# Patient Record
Sex: FEMALE | Employment: FULL TIME | URBAN - METROPOLITAN AREA
[De-identification: names, ages, dates, MRNs, and addresses within clinical notes are randomized per-mention and may not be internally consistent; named-entity substitution may affect disease eponyms.]

---

## 2017-01-04 ENCOUNTER — OFFICE VISIT - RIVER FALLS (OUTPATIENT)
Dept: FAMILY MEDICINE | Facility: CLINIC | Age: 37
End: 2017-01-04
Payer: COMMERCIAL

## 2017-01-04 ASSESSMENT — MIFFLIN-ST. JEOR: SCORE: 1182.97

## 2017-04-06 ENCOUNTER — OFFICE VISIT - RIVER FALLS (OUTPATIENT)
Dept: FAMILY MEDICINE | Facility: CLINIC | Age: 37
End: 2017-04-06
Payer: COMMERCIAL

## 2017-08-26 ENCOUNTER — HEALTH MAINTENANCE LETTER (OUTPATIENT)
Age: 37
End: 2017-08-26

## 2017-09-13 ENCOUNTER — OFFICE VISIT - RIVER FALLS (OUTPATIENT)
Dept: FAMILY MEDICINE | Facility: CLINIC | Age: 37
End: 2017-09-13
Payer: COMMERCIAL

## 2017-09-13 ASSESSMENT — MIFFLIN-ST. JEOR: SCORE: 1218.35

## 2017-09-19 ENCOUNTER — OFFICE VISIT - RIVER FALLS (OUTPATIENT)
Dept: FAMILY MEDICINE | Facility: CLINIC | Age: 37
End: 2017-09-19
Payer: COMMERCIAL

## 2017-09-20 ENCOUNTER — OFFICE VISIT - RIVER FALLS (OUTPATIENT)
Dept: FAMILY MEDICINE | Facility: CLINIC | Age: 37
End: 2017-09-20
Payer: COMMERCIAL

## 2017-10-30 ENCOUNTER — OFFICE VISIT - RIVER FALLS (OUTPATIENT)
Dept: FAMILY MEDICINE | Facility: CLINIC | Age: 37
End: 2017-10-30
Payer: COMMERCIAL

## 2017-10-30 ASSESSMENT — MIFFLIN-ST. JEOR: SCORE: 1202.93

## 2017-11-01 ENCOUNTER — OFFICE VISIT - RIVER FALLS (OUTPATIENT)
Dept: FAMILY MEDICINE | Facility: CLINIC | Age: 37
End: 2017-11-01
Payer: COMMERCIAL

## 2017-11-01 ASSESSMENT — MIFFLIN-ST. JEOR: SCORE: 1202.93

## 2017-11-15 ENCOUNTER — OFFICE VISIT - RIVER FALLS (OUTPATIENT)
Dept: FAMILY MEDICINE | Facility: CLINIC | Age: 37
End: 2017-11-15
Payer: COMMERCIAL

## 2018-01-29 ENCOUNTER — OFFICE VISIT - RIVER FALLS (OUTPATIENT)
Dept: FAMILY MEDICINE | Facility: CLINIC | Age: 38
End: 2018-01-29
Payer: COMMERCIAL

## 2018-01-29 ASSESSMENT — MIFFLIN-ST. JEOR: SCORE: 1221.53

## 2018-01-31 ENCOUNTER — AMBULATORY - RIVER FALLS (OUTPATIENT)
Dept: FAMILY MEDICINE | Facility: CLINIC | Age: 38
End: 2018-01-31
Payer: COMMERCIAL

## 2018-03-03 ENCOUNTER — OFFICE VISIT - RIVER FALLS (OUTPATIENT)
Dept: FAMILY MEDICINE | Facility: CLINIC | Age: 38
End: 2018-03-03
Payer: COMMERCIAL

## 2018-07-12 ENCOUNTER — OFFICE VISIT - RIVER FALLS (OUTPATIENT)
Dept: FAMILY MEDICINE | Facility: CLINIC | Age: 38
End: 2018-07-12
Payer: COMMERCIAL

## 2018-07-12 ASSESSMENT — MIFFLIN-ST. JEOR: SCORE: 1194.31

## 2018-08-27 ENCOUNTER — HEALTH MAINTENANCE LETTER (OUTPATIENT)
Age: 38
End: 2018-08-27

## 2018-09-08 ENCOUNTER — OFFICE VISIT - RIVER FALLS (OUTPATIENT)
Dept: FAMILY MEDICINE | Facility: CLINIC | Age: 38
End: 2018-09-08
Payer: COMMERCIAL

## 2019-01-17 ENCOUNTER — OFFICE VISIT - RIVER FALLS (OUTPATIENT)
Dept: FAMILY MEDICINE | Facility: CLINIC | Age: 39
End: 2019-01-17
Payer: COMMERCIAL

## 2019-01-22 ENCOUNTER — AMBULATORY - RIVER FALLS (OUTPATIENT)
Dept: FAMILY MEDICINE | Facility: CLINIC | Age: 39
End: 2019-01-22
Payer: COMMERCIAL

## 2019-01-22 ENCOUNTER — COMMUNICATION - RIVER FALLS (OUTPATIENT)
Dept: FAMILY MEDICINE | Facility: CLINIC | Age: 39
End: 2019-01-22
Payer: COMMERCIAL

## 2019-01-27 LAB
CAMPYLOBACTER CULTURE: NORMAL
CLOSTRIDIUM DIFFICILE TOXIN A AND B EIA-QUEST: NORMAL
Lab: NORMAL
SALMONELLA/SHIGELLA SCREEN: NORMAL
SHIGA TOXIN 1: NORMAL

## 2019-06-29 ENCOUNTER — OFFICE VISIT - RIVER FALLS (OUTPATIENT)
Dept: FAMILY MEDICINE | Facility: CLINIC | Age: 39
End: 2019-06-29
Payer: COMMERCIAL

## 2019-07-03 ENCOUNTER — OFFICE VISIT - RIVER FALLS (OUTPATIENT)
Dept: FAMILY MEDICINE | Facility: CLINIC | Age: 39
End: 2019-07-03
Payer: COMMERCIAL

## 2019-07-03 ASSESSMENT — MIFFLIN-ST. JEOR: SCORE: 1168.91

## 2019-07-28 ENCOUNTER — OFFICE VISIT - RIVER FALLS (OUTPATIENT)
Dept: FAMILY MEDICINE | Facility: CLINIC | Age: 39
End: 2019-07-28
Payer: COMMERCIAL

## 2019-08-14 ENCOUNTER — OFFICE VISIT - RIVER FALLS (OUTPATIENT)
Dept: FAMILY MEDICINE | Facility: CLINIC | Age: 39
End: 2019-08-14
Payer: COMMERCIAL

## 2019-08-14 ASSESSMENT — MIFFLIN-ST. JEOR: SCORE: 1163.23

## 2019-08-15 LAB — HGB BLD-MCNC: 13.8 GM/DL (ref 11.7–15.5)

## 2019-09-10 ENCOUNTER — OFFICE VISIT - RIVER FALLS (OUTPATIENT)
Dept: FAMILY MEDICINE | Facility: CLINIC | Age: 39
End: 2019-09-10
Payer: COMMERCIAL

## 2019-09-10 ASSESSMENT — MIFFLIN-ST. JEOR: SCORE: 1161.42

## 2019-09-17 ENCOUNTER — RECORDS - HEALTHEAST (OUTPATIENT)
Dept: ADMINISTRATIVE | Facility: OTHER | Age: 39
End: 2019-09-17

## 2019-09-17 LAB
LAB AP CHARGES (HE HISTORICAL CONVERSION): NORMAL
PATH REPORT.COMMENTS IMP SPEC: NORMAL
PATH REPORT.FINAL DX SPEC: NORMAL
PATH REPORT.GROSS SPEC: NORMAL
PATH REPORT.MICROSCOPIC SPEC OTHER STN: NORMAL
PATH REPORT.RELEVANT HX SPEC: NORMAL
RESULT FLAG (HE HISTORICAL CONVERSION): NORMAL

## 2019-11-07 ENCOUNTER — HEALTH MAINTENANCE LETTER (OUTPATIENT)
Age: 39
End: 2019-11-07

## 2020-11-29 ENCOUNTER — HEALTH MAINTENANCE LETTER (OUTPATIENT)
Age: 40
End: 2020-11-29

## 2021-03-04 ENCOUNTER — OFFICE VISIT - RIVER FALLS (OUTPATIENT)
Dept: FAMILY MEDICINE | Facility: CLINIC | Age: 41
End: 2021-03-04
Payer: COMMERCIAL

## 2021-03-04 ENCOUNTER — AMBULATORY - RIVER FALLS (OUTPATIENT)
Dept: FAMILY MEDICINE | Facility: CLINIC | Age: 41
End: 2021-03-04
Payer: COMMERCIAL

## 2021-03-08 ENCOUNTER — COMMUNICATION - RIVER FALLS (OUTPATIENT)
Dept: FAMILY MEDICINE | Facility: CLINIC | Age: 41
End: 2021-03-08
Payer: COMMERCIAL

## 2021-03-08 LAB — SARS-COV-2 RNA RESP QL NAA+PROBE: NEGATIVE

## 2021-07-21 ENCOUNTER — OFFICE VISIT - RIVER FALLS (OUTPATIENT)
Dept: FAMILY MEDICINE | Facility: CLINIC | Age: 41
End: 2021-07-21
Payer: COMMERCIAL

## 2021-07-22 ENCOUNTER — COMMUNICATION - RIVER FALLS (OUTPATIENT)
Dept: FAMILY MEDICINE | Facility: CLINIC | Age: 41
End: 2021-07-22
Payer: COMMERCIAL

## 2021-07-22 LAB
BASOPHILS # BLD MANUAL: 60 10*3/UL (ref 0–200)
BASOPHILS NFR BLD MANUAL: 0.6 %
BUN SERPL-MCNC: 16 MG/DL (ref 7–25)
BUN/CREAT RATIO - HISTORICAL: NORMAL (ref 6–22)
C REACTIVE PROTEIN LHE: 2 MG/L
CALCIUM SERPL-MCNC: 9.3 MG/DL (ref 8.6–10.2)
CHLORIDE BLD-SCNC: 102 MMOL/L (ref 98–110)
CO2 SERPL-SCNC: 24 MMOL/L (ref 20–32)
CREAT SERPL-MCNC: 0.85 MG/DL (ref 0.5–1.1)
EGFRCR SERPLBLD CKD-EPI 2021: 85 ML/MIN/1.73M2
EOSINOPHIL # BLD MANUAL: 330 10*3/UL (ref 15–500)
EOSINOPHIL NFR BLD MANUAL: 3.3 %
ERYTHROCYTE [DISTWIDTH] IN BLOOD BY AUTOMATED COUNT: 12.2 % (ref 11–15)
ERYTHROCYTE [SEDIMENTATION RATE] IN BLOOD BY WESTERGREN METHOD: 2 MM/H
GLUCOSE BLD-MCNC: 77 MG/DL (ref 65–99)
HCT VFR BLD AUTO: 43.4 % (ref 35–45)
HGB BLD-MCNC: 14.3 GM/DL (ref 11.7–15.5)
LYMPHOCYTES # BLD MANUAL: 2680 10*3/UL (ref 850–3900)
LYMPHOCYTES NFR BLD MANUAL: 26.8 %
MCH RBC QN AUTO: 30.4 PG (ref 27–33)
MCHC RBC AUTO-ENTMCNC: 32.9 GM/DL (ref 32–36)
MCV RBC AUTO: 92.3 FL (ref 80–100)
MONOCYTES # BLD MANUAL: 790 10*3/UL (ref 200–950)
MONOCYTES NFR BLD MANUAL: 7.9 %
NEUTROPHILS # BLD MANUAL: 6140 10*3/UL (ref 1500–7800)
NEUTROPHILS NFR BLD MANUAL: 61.4 %
PLATELET # BLD AUTO: 260 10*3/UL (ref 140–400)
PMV BLD: 10.6 FL (ref 7.5–12.5)
POTASSIUM BLD-SCNC: 4.3 MMOL/L (ref 3.5–5.3)
RBC # BLD AUTO: 4.7 10*6/UL (ref 3.8–5.1)
SODIUM SERPL-SCNC: 137 MMOL/L (ref 135–146)
TSH SERPL DL<=0.005 MIU/L-ACNC: 1.94 MIU/L
WBC # BLD AUTO: 10 10*3/UL (ref 3.8–10.8)

## 2021-09-25 ENCOUNTER — HEALTH MAINTENANCE LETTER (OUTPATIENT)
Age: 41
End: 2021-09-25

## 2021-10-07 ENCOUNTER — COMMUNICATION - RIVER FALLS (OUTPATIENT)
Dept: FAMILY MEDICINE | Facility: CLINIC | Age: 41
End: 2021-10-07
Payer: COMMERCIAL

## 2022-01-15 ENCOUNTER — HEALTH MAINTENANCE LETTER (OUTPATIENT)
Age: 42
End: 2022-01-15

## 2022-02-11 VITALS
BODY MASS INDEX: 22.08 KG/M2 | HEART RATE: 74 BPM | DIASTOLIC BLOOD PRESSURE: 74 MMHG | WEIGHT: 124.6 LBS | SYSTOLIC BLOOD PRESSURE: 112 MMHG | HEIGHT: 63 IN

## 2022-02-11 VITALS
SYSTOLIC BLOOD PRESSURE: 92 MMHG | DIASTOLIC BLOOD PRESSURE: 60 MMHG | BODY MASS INDEX: 22.46 KG/M2 | WEIGHT: 126.8 LBS | HEART RATE: 64 BPM | TEMPERATURE: 98.2 F

## 2022-02-12 VITALS
HEIGHT: 64 IN | DIASTOLIC BLOOD PRESSURE: 63 MMHG | BODY MASS INDEX: 19.74 KG/M2 | SYSTOLIC BLOOD PRESSURE: 95 MMHG | WEIGHT: 115.6 LBS | TEMPERATURE: 98 F | HEART RATE: 79 BPM

## 2022-02-12 VITALS
DIASTOLIC BLOOD PRESSURE: 64 MMHG | WEIGHT: 130 LBS | BODY MASS INDEX: 23.92 KG/M2 | HEIGHT: 62 IN | TEMPERATURE: 97.2 F | HEART RATE: 88 BPM | WEIGHT: 130 LBS | DIASTOLIC BLOOD PRESSURE: 78 MMHG | BODY MASS INDEX: 23.92 KG/M2 | OXYGEN SATURATION: 97 % | HEIGHT: 62 IN | SYSTOLIC BLOOD PRESSURE: 104 MMHG | BODY MASS INDEX: 24.22 KG/M2 | HEART RATE: 69 BPM | DIASTOLIC BLOOD PRESSURE: 60 MMHG | SYSTOLIC BLOOD PRESSURE: 110 MMHG | DIASTOLIC BLOOD PRESSURE: 68 MMHG | SYSTOLIC BLOOD PRESSURE: 113 MMHG | DIASTOLIC BLOOD PRESSURE: 64 MMHG | TEMPERATURE: 98.8 F | WEIGHT: 130.8 LBS | DIASTOLIC BLOOD PRESSURE: 60 MMHG | TEMPERATURE: 98.4 F | HEART RATE: 80 BPM | HEART RATE: 86 BPM | HEART RATE: 80 BPM | SYSTOLIC BLOOD PRESSURE: 100 MMHG | WEIGHT: 132.4 LBS | WEIGHT: 130 LBS | HEART RATE: 83 BPM | BODY MASS INDEX: 23.78 KG/M2 | BODY MASS INDEX: 24.55 KG/M2 | SYSTOLIC BLOOD PRESSURE: 100 MMHG | TEMPERATURE: 98.1 F | SYSTOLIC BLOOD PRESSURE: 94 MMHG | HEIGHT: 62 IN | BODY MASS INDEX: 23.92 KG/M2 | WEIGHT: 133.4 LBS

## 2022-02-12 VITALS
TEMPERATURE: 98.1 F | OXYGEN SATURATION: 99 % | DIASTOLIC BLOOD PRESSURE: 70 MMHG | BODY MASS INDEX: 22.36 KG/M2 | HEART RATE: 74 BPM | SYSTOLIC BLOOD PRESSURE: 118 MMHG | WEIGHT: 126.2 LBS

## 2022-02-12 VITALS
SYSTOLIC BLOOD PRESSURE: 100 MMHG | OXYGEN SATURATION: 96 % | TEMPERATURE: 97.3 F | SYSTOLIC BLOOD PRESSURE: 102 MMHG | WEIGHT: 12.4 LBS | HEART RATE: 76 BPM | HEART RATE: 101 BPM | DIASTOLIC BLOOD PRESSURE: 64 MMHG | DIASTOLIC BLOOD PRESSURE: 58 MMHG | BODY MASS INDEX: 19.81 KG/M2 | BODY MASS INDEX: 21.15 KG/M2 | WEIGHT: 119 LBS | SYSTOLIC BLOOD PRESSURE: 102 MMHG | DIASTOLIC BLOOD PRESSURE: 60 MMHG | HEIGHT: 64 IN | WEIGHT: 119.4 LBS | HEART RATE: 76 BPM | WEIGHT: 116 LBS | HEART RATE: 82 BPM | HEIGHT: 63 IN | SYSTOLIC BLOOD PRESSURE: 108 MMHG | DIASTOLIC BLOOD PRESSURE: 72 MMHG | OXYGEN SATURATION: 98 % | BODY MASS INDEX: 21.09 KG/M2 | RESPIRATION RATE: 12 BRPM | TEMPERATURE: 98.4 F | TEMPERATURE: 98 F | BODY MASS INDEX: 2.2 KG/M2

## 2022-02-12 VITALS
HEART RATE: 88 BPM | SYSTOLIC BLOOD PRESSURE: 110 MMHG | DIASTOLIC BLOOD PRESSURE: 72 MMHG | HEIGHT: 62 IN | WEIGHT: 125.6 LBS | TEMPERATURE: 98.2 F | BODY MASS INDEX: 23.11 KG/M2

## 2022-02-12 VITALS
BODY MASS INDEX: 23.14 KG/M2 | WEIGHT: 130.6 LBS | OXYGEN SATURATION: 98 % | HEIGHT: 63 IN | HEART RATE: 73 BPM | DIASTOLIC BLOOD PRESSURE: 62 MMHG | SYSTOLIC BLOOD PRESSURE: 94 MMHG | TEMPERATURE: 97.5 F

## 2022-02-12 VITALS
DIASTOLIC BLOOD PRESSURE: 62 MMHG | TEMPERATURE: 98.7 F | WEIGHT: 127.2 LBS | SYSTOLIC BLOOD PRESSURE: 100 MMHG | BODY MASS INDEX: 23.27 KG/M2 | HEART RATE: 72 BPM

## 2022-02-12 VITALS
BODY MASS INDEX: 23.24 KG/M2 | SYSTOLIC BLOOD PRESSURE: 105 MMHG | HEART RATE: 71 BPM | DIASTOLIC BLOOD PRESSURE: 71 MMHG | WEIGHT: 133.3 LBS | TEMPERATURE: 97.5 F

## 2022-02-12 VITALS
BODY MASS INDEX: 21.97 KG/M2 | OXYGEN SATURATION: 100 % | TEMPERATURE: 97.9 F | WEIGHT: 124 LBS | HEART RATE: 70 BPM | DIASTOLIC BLOOD PRESSURE: 62 MMHG | SYSTOLIC BLOOD PRESSURE: 94 MMHG

## 2022-02-15 NOTE — NURSING NOTE
Comprehensive Intake Entered On:  9/10/2019 5:39 PM CDT    Performed On:  9/10/2019 5:37 PM CDT by Meredith Arechiga               Summary   Chief Complaint :   Preop.  Dr. Pederson.  [facility].  9/13/19.  Laparoscopy, Hysterectomy, D&C and possible ablation   Menstrual Status :   Menarcheal   Weight Measured :   115.6 lb(Converted to: 115 lb 10 oz, 52.44 kg)    Height Measured :   63.5 in(Converted to: 5 ft 3 in, 161.29 cm)    Body Mass Index :   20.15 kg/m2   Body Surface Area :   1.53 m2   Systolic Blood Pressure :   95 mmHg   Diastolic Blood Pressure :   63 mmHg   Mean Arterial Pressure :   74 mmHg   Peripheral Pulse Rate :   79 bpm   BP Method :   Electronic   HR Method :   Electronic   Temperature Tympanic :   98.0 DegF(Converted to: 36.7 DegC)    Race :      Languages :   English   Ethnicity :   Not  or    Meredith Arechiga - 9/10/2019 5:37 PM CDT   Health Status   Allergies Verified? :   Yes   Medication History Verified? :   Yes   Pre-Visit Planning Status :   Completed   Tobacco Use? :   Never smoker   Meredith Arechiga - 9/10/2019 5:37 PM CDT   Meds / Allergies   (As Of: 9/10/2019 5:39:53 PM CDT)   Allergies (Active)   Coumadin  Estimated Onset Date:   Unspecified ; Created By:   Rosario Dela Cruz; Reaction Status:   Active ; Category:   Drug ; Substance:   Coumadin ; Type:   Allergy ; Updated By:   Rosario Dela Cruz; Source:   Paper Chart ; Reviewed Date:   7/3/2019 8:32 AM CDT      Cymbalta  Estimated Onset Date:   Unspecified ; Reactions:   groggy ; Created By:   Diana Ayala CMA; Reaction Status:   Active ; Category:   Drug ; Substance:   Cymbalta ; Type:   Allergy ; Updated By:   Diana Ayala CMA; Reviewed Date:   7/3/2019 8:32 AM CDT        Medication List   (As Of: 9/10/2019 5:39:53 PM CDT)   Prescription/Discharge Order    progesterone  :   progesterone ; Status:   Processing ; Ordered As Mnemonic:   Prometrium 100 mg oral capsule ; Ordering Provider:   Dennis  Vaughn AGRAWAL; Action Display:   Complete ; Catalog Code:   progesterone ; Order Dt/Tm:   9/10/2019 5:38:41 PM          atropine-diphenoxylate  :   atropine-diphenoxylate ; Status:   Prescribed ; Ordered As Mnemonic:   Lomotil 2.5 mg-0.025 mg oral tablet ; Simple Display Line:   1 tab(s), po, q6 hrs, PRN: as needed for loose stool, 100 tab(s), 1 Refill(s) ; Ordering Provider:   Samuel Dawn MD; Catalog Code:   atropine-diphenoxylate ; Order Dt/Tm:   1/17/2019 6:30:46 PM            Home Meds    rizatriptan  :   rizatriptan ; Status:   Documented ; Ordered As Mnemonic:   Maxalt ; Simple Display Line:   Oral, daily, PRN: as needed for migraine headache, 0 Refill(s) ; Catalog Code:   rizatriptan ; Order Dt/Tm:   8/14/2019 4:48:34 PM          calcium carbonate  :   calcium carbonate ; Status:   Documented ; Ordered As Mnemonic:   Tums ; Simple Display Line:   500 mg, Chewed, daily, 0 Refill(s) ; Catalog Code:   calcium carbonate ; Order Dt/Tm:   1/17/2019 5:49:19 PM          loperamide  :   loperamide ; Status:   Documented ; Ordered As Mnemonic:   Imodium A-D ; Simple Display Line:   2 mg, Oral, q4 hrs, 0 Refill(s) ; Catalog Code:   loperamide ; Order Dt/Tm:   1/17/2019 5:49:15 PM

## 2022-02-15 NOTE — TELEPHONE ENCOUNTER
Entered by Diana Ayala CMA on January 18, 2019 9:56:03 AM CST  LM for return call at 0954    will start on imipramine 10mg qhs for 3 weeks, then increase to 20mg qhs     pt did  sample containers yesterday, but I know that lab forgot to give her one and was returning today to obtain the other container.        ---------------------  From: Samuel Dawn MD   To: Diana Ayala CMA;     Sent: 1/17/2019 9:16:11 PM CST  Subject: General Message     Can you share instructions of imipramine use with Lazara.  Also, don't know if she picked up container for stool samples.see portal message

## 2022-02-15 NOTE — NURSING NOTE
Seen for COVID testing at [facility] per Dr. Valentino Daniel CNP    Fax Result to: _n/a    O2 Sat = 72%  Provider stated that O2 sat was tested on 5 different fingers.  Stated that the patient was not exhibiting any signs of respiratory distress and declined any further evaluation.  Patient advised to seek medical attention if signs of respiratory distress emerge.  (Children under 12 do not require O2 sat)    Specimen sent to:  _ [facility]    PUI form faxed to _ [facility].

## 2022-02-15 NOTE — RESULTS
Patient:   KENYA HERNANDEZ            MRN: [id number]            FIN: [id number]               Age:   39 years     Sex:  Female     :  1980   Associated Diagnoses:   None   Author:   Dale Malagon MD      Procedure   EKG procedure   Date/ Time:  2019 3:12:00 PM.     EKG findings   Interpretation: Dale Malagon MD.     Rhythm: heart rate  65  beats/min.     P waves: normal.     QRS complex: normal.     ST-T-U complex: normal.     Interpretation: normal EKG.

## 2022-02-15 NOTE — LETTER
(Inserted Image. Unable to display)   August 21, 2019      KENYA HERNANDEZ  [address]  [place], [place] [id number]        Dear KENYA,      Thank you for selecting [facility] (previously [facility]) for your healthcare needs.     Normal pap.       Result Name Current Result   ThinPrep PAP with HPV   8/14/2019       Please contact me or my assistant at [phone] if you have any questions or concerns.     Sincerely,

## 2022-02-15 NOTE — TELEPHONE ENCOUNTER
---------------------  From: Thalia Gomez CMA   To: Phone Messages Pool ([id number]_[place]);     Sent: 8/15/2019 8:27:25 AM CDT  Subject: hgb result     Pt notified via VM to c/b. Her hgb result was normal at 13.8. Will continue with TAW plan from yesterday's visit.Pt called back yesterday and LM per pt OK to leave detailed message on her VM. Left msg above at 10:22am

## 2022-02-15 NOTE — TELEPHONE ENCOUNTER
---------------------  From: Dinaa Ayala CMA (Phone Messages Pool ([id number]_[facility]))   To: Sarahi Schuster;     Sent: 8/16/2019 10:32:50 AM CDT  Subject: General Message-Progesterone     Phone Message    PCP:   Asked for TAW(OC)      Time of Call:  1018       Person Calling:  self  Phone number:  [phone], ok LM     Note:   pt seen by TAW 8/14(no note avail), he prescribed progesterone and said he told her to begin taking a beginning of the month x 2wks, but on her bottle when she picked up it states once daily.  Please advise    Last office visit and reason:  10/14      also LM about labs, but looks like she already had a message left on that---------------------  From: Sarahi Schuster   To: Phone Angel Alerts Pool ([id number]_[place]);     Sent: 8/16/2019 10:44:16 AM CDT  Subject: RE: General Message-Progesterone     There are many different ways that Progesterone is prescribed/taken. She is going to be taking it once daily, the question seems to be how many weeks of the month does he want her using it. With that particular dose it might actually be 30 days of the month. Tell her she can start it today and then I would forward this to Dr Collins to address when back in clinic.---------------------  From: Diana Ayala CMA (Phone Messages Pool ([id number]_[place] [employer] [place]))   To: TAW Message Pool ([id number]_[facility]);     Sent: 8/16/2019 10:48:36 AM CDT  Subject: FW: General Message-Progesterone     contacted pt and notified per NCB message, she's fine waiting for TAW return next wk.  also reviewed Hgb results per messageLM with TAW note info:    We will try cycling on Prometrium two weeks out of every month and expect menses as withdrawal from that.

## 2022-02-15 NOTE — PROGRESS NOTES
Patient:   KENYA HERNANDEZ            MRN: [id number]            FIN: [id number]               Age:   38 years     Sex:  Female     :  1980   Associated Diagnoses:   Migraine   Author:   Tay Webber PA-C      Chief Complaint   2018 12:53 PM CDT    Migraine x3 days.  Vision is blurred, nausea.      Interval History   Chief complaint and symptoms noted above and confirmed with patient   migraine for 3 days, worse on right side, but tight band across forehead  has been seeing neurology, is scheduled for her first Botox injection next week  has been using excedrin and ibuprofen  nausea but no emesis      Review of Systems   Eye:  Blurring, photophobic.    Gastrointestinal:  Nausea, No vomiting.       Health Status   Allergies:    Allergic Reactions (Selected)  Severity Not Documented  Coumadin (No reactions were documented)  Cymbalta (Groggy)   Medications:  (Selected)   Documented Medications  Documented  tiZANidine: Oral, prn, Instructions: Rx'd by Dr. Vogel, 0 Refill(s), Type: Maintenance   Problem list:    All Problems (Selected)  Tension headache / SNOMED CT 6556795746 / Confirmed  Anxiety disorder / SNOMED CT 6SU95P17-354M-81O7-[id number] / Confirmed  Chronic fatigue disorder / SNOMED CT [id number] / Confirmed  Irritable bowel syndrome (IBS) / SNOMED CT 5XYKN774-43Y7-920H-KS8Q-CK381PQTJH3X / Confirmed  Stress incontinence in female / SNOMED CT [id number] / Confirmed  Arthralgia / SNOMED CT 08643953 / Confirmed      Histories   Past Medical History:    Active  Stress incontinence in female (18984028-[id number]): Onset in  at 33 years.  Arthralgia (50867014)  Anxiety disorder (1FY04E02-864H-30P4-VQ43-JYM54508Y965)  Irritable bowel syndrome (IBS) (6VPQN517-08P2-322H-QB6W-JX480ASPSQ6F)  Chronic fatigue disorder (84O26718-4MV9-3117-BX78-I2DG52P87O37)  Tension headache (9114155297)  Resolved  *[email or web address] - Arthralgia of  multiple joints: Onset on 6/21/2010 at 29 years.  Resolved.  Pregnancy (171523275): Onset on 7/29/2006 at 26 years.  Resolved on 4/7/2007 at 26 years.  Protein S deficiency (0983711693):  Resolved.  DVT - Deep vein thrombosis of lower limb (6909753176):  Resolved.  PCOD - Polycystic ovarian disease (740474959):  Resolved.  Infertility treatment (2097892167):  Resolved.   Family History:    Hearing aid  Daughter  Son  Diabetes mellitus  Mother  Systemic lupus erythematosus  Sister  Rheumatoid arthritis  Sister  Heart disease  Father  Thyroid disease  Mother     Procedure history:    Colonoscopy (45.23) on 1/31/2014 at 33 Years.  Comments:  2/13/2014 2:15 PM - Diana Mahmood  Normal.  Esophagogastroduodenoscopy (958146534) on 1/31/2014 at 33 Years.  Laparoscopy (626049414) on 6/22/2012 at 31 Years.  Appendectomy (679272171).  Hernia repair (04216644).  knee surgery x3.  Intrauterine insemination (296007300).      Physical Examination   Vital Signs   9/8/2018 12:53 PM CDT Temperature Tympanic 97.9 DegF    Peripheral Pulse Rate 70 bpm    Systolic Blood Pressure 94 mmHg    Diastolic Blood Pressure 62 mmHg    Mean Arterial Pressure 73 mmHg    Oxygen Saturation 100 %      Measurements from flowsheet : Measurements   9/8/2018 12:53 PM CDT    Weight Measured - Standard                124.0 lb     General:  Mild distress.    Eye:  Pupils are equal, round and reactive to light, Extraocular movements are intact.    HENT:  tenderness over sinuses.    Respiratory:  Lungs are clear to auscultation.    Cardiovascular:  Normal rate, Regular rhythm, No murmur.       Impression and Plan   Diagnosis     Migraine (KNM12-YW G43.909).     Summary:  will treat with 60 mg toradol, will also do a burst of prednisone for 5 days  she does not want anything that will be sedating so phenergan is not used today  she will follow up with neurology next week for her first Botox injection.    Orders     Orders   Pharmacy:  predniSONE 20 mg oral tablet  (Prescribe): = 2 tab(s) ( 40 mg ), PO, Daily, x 7 day(s), # 14 tab(s), 0 Refill(s), Type: Maintenance, Pharmacy: [id number], 2 tab(s) Oral daily,x7 day(s).     Orders   Charges (Evaluation and Management):  [id number] office outpatient visit 15 minutes (Charge) (Order): Quantity: 1, Migraine.

## 2022-02-15 NOTE — NURSING NOTE
Comprehensive Intake Entered On:  2019 9:43 AM CDT    Performed On:  2019 9:40 AM CDT by Briana Somers CMA               Summary   Chief Complaint :   patient here today with a migraine present for 4 days.    Menstrual Status :   Menarcheal   Weight Measured :   119.4 lb(Converted to: 119 lb 6 oz, 54.16 kg)    Systolic Blood Pressure :   102 mmHg   Diastolic Blood Pressure :   60 mmHg   Mean Arterial Pressure :   74 mmHg   Peripheral Pulse Rate :   101 bpm (HI)    BP Site :   Right arm   BP Method :   Manual   HR Method :   Electronic   Oxygen Saturation :   98 %   Race :      Languages :   English   Ethnicity :   Not  or    Briana Somers CMA - 2019 9:40 AM CDT   Health Status   Allergies Verified? :   Yes   Medication History Verified? :   Yes   Pre-Visit Planning Status :   N/A   Tobacco Use? :   Never smoker   Briana Somers CMA - 2019 9:40 AM CDT   Demographics   Last Name :   Josef   Address :   [address] [place] [address]   First Name :   Lazara Caraballo Initial :   CAMILLA   Responsible Party Date of Birth () :   1980 CDT   City :   [place]   State :   [place]   Zip Code :   [address]   Briana Somers CMA - 2019 9:40 AM CDT   Consents   Consent for Immunization Exchange :   Consent Granted   Consent for Immunizations to Providers :   Consent Granted   Briana Somers CMA 2019 9:40 AM CDT   Meds / Allergies   (As Of: 2019 9:43:41 AM CDT)   Allergies (Active)   Coumadin  Estimated Onset Date:   Unspecified ; Created By:   Rosario Dela Cruz; Reaction Status:   Active ; Category:   Drug ; Substance:   Coumadin ; Type:   Allergy ; Updated By:   Rosario Dela Cruz; Source:   Paper Chart ; Reviewed Date:   2018 1:10 PM CDT      Cymbalta  Estimated Onset Date:   Unspecified ; Reactions:   groggy ; Created By:   Diana Ayala CMA; Reaction Status:   Active ; Category:   Drug ; Substance:   Cymbalta ; Type:   Allergy ; Updated By:   Diana Ayala CMA; Reviewed Date:   2018  1:10 PM CDT        Medication List   (As Of: 6/29/2019 9:43:41 AM CDT)   Prescription/Discharge Order    atropine-diphenoxylate  :   atropine-diphenoxylate ; Status:   Prescribed ; Ordered As Mnemonic:   Lomotil 2.5 mg-0.025 mg oral tablet ; Simple Display Line:   1 tab(s), po, q6 hrs, PRN: as needed for loose stool, 100 tab(s), 1 Refill(s) ; Ordering Provider:   Samuel Dawn MD; Catalog Code:   atropine-diphenoxylate ; Order Dt/Tm:   1/17/2019 6:30:46 PM          imipramine  :   imipramine ; Status:   Prescribed ; Ordered As Mnemonic:   imipramine 10 mg oral tablet ; Simple Display Line:   See Instructions, 1 tab(s) Oral qhs for 3 weeks, then increase to 2 tabs qhs, 60 tab(s), 5 Refill(s) ; Ordering Provider:   Samuel Dawn MD; Catalog Code:   imipramine ; Order Dt/Tm:   1/17/2019 9:11:29 PM            Home Meds    calcium carbonate  :   calcium carbonate ; Status:   Documented ; Ordered As Mnemonic:   Tums ; Simple Display Line:   500 mg, Chewed, daily, 0 Refill(s) ; Catalog Code:   calcium carbonate ; Order Dt/Tm:   1/17/2019 5:49:19 PM          loperamide  :   loperamide ; Status:   Documented ; Ordered As Mnemonic:   Imodium A-D ; Simple Display Line:   2 mg, Oral, q4 hrs, 0 Refill(s) ; Catalog Code:   loperamide ; Order Dt/Tm:   1/17/2019 5:49:15 PM          tiZANidine  :   tiZANidine ; Status:   Documented ; Ordered As Mnemonic:   tiZANidine ; Simple Display Line:   Oral, prn, Rx'd by Dr. Vogel, 0 Refill(s) ; Catalog Code:   tiZANidine ; Order Dt/Tm:   9/8/2018 12:54:45 PM

## 2022-02-15 NOTE — PROGRESS NOTES
Patient:   LAZARA HERNANDEZ            MRN: [id number]            FIN: [id number]               Age:   37 years     Sex:  Female     :  1980   Associated Diagnoses:   Tension headache; Chronic myofascial pain   Author:   Samuel Dawn MD      Visit Information      Date of Service: 11/15/2017 02:15 pm  Performing Location: [facility]  Encounter#: [id number]      Primary Care Provider (PCP):  Samuel Dawn MD    NPI# [id number]      Referring Provider:  Samuel Dawn MD    NPI# [id number]      Chief Complaint      History of Present Illness   2015: Lazara presents to clinic with recurrent irritable bowel symptoms.  She had similar incidents approximately 1 year ago and underwent extensive gastroenterology evaluation at that time.  She found intermittent use of loperamide to be most effective.  Now she is having copious diarrhea approximately 6-10 times daily.  Also questions whether any of this could be due to underlying vitamin D deficiency.        2017: Lazara returns to clinic for a reevaluation of her tension type headache. She complains of a band like feeling around her head and extending into her neck and shoulders. She was seen last month by Dr. Lu, and prescribed PT and amitriptyline. She did not  the amitryptyline as she was unsure to how it works and why it would be beneficial. She did see PT twice a week and has recieved some benefit from it. She also had resolution of her bowel issues due to limiting certain food items.      2017: Lazara returns to clinic for a reevaluation of her tension type headache. She complains of a band like feeling around her head and extending into her neck and shoulders. She was seen last month by Dr. Lu, and prescribed PT and amitriptyline. She did not  the amitryptyline as she was unsure to how it works and why it would be beneficial. She did see PT twice a week and has recieved some benefit from it. She also had resolution of her  bowel issues due to limiting certain food items.     9/19/2017: Presents for follow-up related to chronic tension headache.  Did titrate up amitriptyline up to 50 mg daily.  At higher doses was limited by profound grogginess and sedation.  Has been off the medicine now for approximately 3 weeks.  Since stopping the medicine describes having more abdominal issues and feels somewhat more anxious.  No substantial change in headache tendency.  Raises some concerns about potential medication related weight gain.    11/15/2017: Lazara returns to clinic with persistent chronic tension headaches.  Has remained on amitriptyline 25 mg daily.  Continues to work with physical therapy though does not think it provides much benefit.  Continues to describe a generalized headache pain primarily centered at base of occiput, extending across trapezial muscles.  No photophobia no photophobia.  Typically does respond temporarily she takes Excedrin or Tylenol.  She is intentionally been very sparing and use of these medications.  She was trialed on Cymbalta and had an idiosyncratic response with a near syncopal event.  She has tried acupuncture without much benefit.  She did do trigger point injections here locally with fantastic response though only lasting for 3 or 4 days with return of some symptoms.         Review of Systems   Constitutional:  Negative.    Eye:  Negative.    Ear/Nose/Mouth/Throat:  Negative.    Respiratory:  Negative.    Cardiovascular:  Negative.    Gastrointestinal:  Diarrhea.    Musculoskeletal:  Neck pain.         Back pain: Bilaterally, In the upper region.    Neurologic:  Alert and oriented X4, Headache.    Psychiatric:  Negative.       Health Status   Allergies:    Allergic Reactions (Selected)  Severity Not Documented  Coumadin (No reactions were documented)  Cymbalta (Groggy)   Problem list:    All Problems  Anxiety disorder / SNOMED CT [id number]-91D3-[id number] / Confirmed  Chronic fatigue  disorder / SNOMED CT 50M57768-9ZQ8-2344-US67-R7AC15T29H04 / Confirmed  Irritable bowel syndrome (IBS) / SNOMED CT 6BWCJ235-68P9-657W-[id number]-DC898XMRYI7Q / Confirmed  Arthralgia / SNOMED CT 89378024 / Confirmed  Stress incontinence in female / SNOMED CT 96941417-XSV3-189N-J0UA-0191356138W5 / Confirmed  Tension headache / SNOMED CT 3635851888 / Confirmed  Resolved: *Hospitalized@[facility] - Arthralgia of multiple joints  Resolved: DVT - Deep vein thrombosis of lower limb / SNOMED CT 8434036032  Resolved: Infertility treatment / SNOMED CT 6408340247  Resolved: PCOD - Polycystic ovarian disease / SNOMED CT 503347028  Resolved: Pregnancy / SNOMED CT 902629448  Resolved: Pregnant / SNOMED CT 458358109  Resolved: Protein S deficiency / SNOMED CT 8338309784      Histories   Past Medical History:    Active  Stress incontinence in female (34657919-TEW0-928A-H2JF-7431728928S4): Onset in 2013 at 33 years.  Arthralgia (65189185)  Anxiety disorder (3VT29H18-769J-66Y4-FR67-ZFH46685M806)  Irritable bowel syndrome (IBS) (6YASI632-09I8-920X-WY7O-NY594GPBRO6J)  Chronic fatigue disorder (29A18510-7LL0-6119-PO47-V3LS46G89D39)  Tension headache (2096409089)  Resolved  *Hospitalized@[facility] - Arthralgia of multiple joints: Onset on 6/21/2010 at 29 years.  Resolved.  Pregnancy (455220890): Onset on 7/29/2006 at 26 years.  Resolved on 4/7/2007 at 26 years.  Protein S deficiency (1707846362):  Resolved.  DVT - Deep vein thrombosis of lower limb (4466982809):  Resolved.  PCOD - Polycystic ovarian disease (501271932):  Resolved.  Infertility treatment (7511732435):  Resolved.   Family History:    Hearing aid  Daughter  Son  Diabetes mellitus  Mother  Systemic lupus erythematosus  Sister  Rheumatoid arthritis  Sister  Heart disease  Father  Thyroid disease  Mother     Procedure history:    Colonoscopy (45.23) on 1/31/2014 at 33 Years.  Comments:  2/13/2014 2:15 PM - Diana Mahmood.  Esophagogastroduodenoscopy (484190308) on 1/31/2014 at 33  Years.  Laparoscopy (911623087) on 6/22/2012 at 31 Years.  Appendectomy (835997339).  Hernia repair (34216972).  knee surgery x3.  Intrauterine insemination (843940378).   Social History:        Alcohol Assessment            1 drink about once per week, 1 drinks/episode average.  Alcohol use interferes with work or home: No.  Drinks               more than intended: No.      Tobacco Assessment            Never      Substance Abuse Assessment            Never      Employment and Education Assessment            Unemployed, Work/School description: homemaker.      Home and Environment Assessment            Marital status: .  Lives with Children, Spouse.            Marital status: .  Spouse/Partner name: Amrik Bahena.  Lives with Self, Children, Spouse.  2 children.               Risks in environment: Unlocked guns.      Nutrition and Health Assessment            Type of diet: Regular, Gluten free, dairy free.  Wants to lose weight: Yes.  Sleeping concerns: Yes.  Feels               highly stressed: Yes.      Exercise and Physical Activity Assessment            Exercise frequency: 3-4 times/week.  Exercise type: Cardio.      Sexual Assessment            Sexually active: Yes.  Sexual orientation: Heterosexual.        Physical Examination   VS/Measurements   General:  Alert and oriented, No acute distress.    Eye:  Pupils are equal, round and reactive to light, Extraocular movements are intact.    HENT:  Normocephalic.    Respiratory:  Respirations are non-labored.    Cardiovascular:  Normal rate, Regular rhythm.    Neurologic:  Alert, Oriented, Normal sensory, Normal motor function, No focal deficits, Cranial Nerves II-XII are grossly intact.    Psychiatric:  Cooperative, Appropriate mood & affect.       Impression and Plan   Diagnosis     Tension headache (PYU73-AA G44.209).     Chronic myofascial pain (PPQ45-RS M79.1).     Plan   Orders     Orders (Selected)   Outpatient Orders  Ordered  Physical Therapy  (Request): Tension headache  Referral (Request): 11/15/17 14:57:00 CST, Referred to: Neurology, Referred to: Headache specialty clinic - [place], Chronic tension headache  Return to Clinic (Request): Return in 2 months  Prescriptions  Prescribed  loperamide 2 mg oral capsule: 1 cap(s) ( 2 mg ), PO, q4hr, PRN: for loose stools, # 60 cap(s), 0 Refill(s), Type: Maintenance, Pharmacy: [id number], 1 cap(s) po q4 hrs,PRN:for loose stools  tiZANidine 4 mg oral tablet: 1 tab(s) ( 4 mg ), PO, q8hr, PRN: as needed for muscle spasm, # 60 tab(s), 1 Refill(s), Type: Maintenance, Pharmacy: [facility] [id number], 1 tab(s) po q8 hrs,PRN:as needed for muscle spasm  topiramate 25 mg oral tablet: See Instructions, Instructions: 1 tab(s) po daily for 1 week, then increase by 1 tab per week to goal of 100mg, # 120 tab(s), 1 Refill(s), Type: Maintenance, Pharmacy: [facility] [id number], 1 tab(s) po daily for 1 week, then increase by 1 tab pe....       .) chronic, tension headache   - initially with very favorable response to amitriptyline 25mg daily, since no observed effect    - poor tolerance of 50mg qhs dosing    - advised to stop   - limited improvement with ongoing PT - okay yo stop at this point   - poor response to Cymbalta (could not tolerate at all)   - good short term response to trigger point injection (lasted 3-4 days)   - has limited abortive NSAIDs, Tylenol, Excedrin as much as feasible - doubt rebound headache component   - will trial on tizanidine 4mg TID prn   - I'd like her to start on topiramate 25mg daily, titrate up by 25mg/week to goal dose of 100mg daily   - offered referral to Dr. Friedman for further trigger point injections   - referral to neurology [facility], potential candidate for future botox injections     RTC in 2 months

## 2022-02-15 NOTE — PROGRESS NOTES
"Chief Complaint    Seen in urgent care over the weekend for migraine. Would like trigger pain injections.  History of Present Illness      Patient following up Migraine was in on 06/29/19 to Urgent care. No current migraine today. Still taking Prednisone. Does do some home exercises to help loosen shoulders  Review of Systems      \"See HPI.  All other review of systems negative.\"  Physical Exam   Vitals & Measurements    T: 97.3   F (Tympanic)  HR: 76(Peripheral)  BP: 102/64     HT: 63 in  WT: 119 lb  BMI: 21.08           General:  Alert and oriented, No acute distress.            Eye:  Pupils are equal, round and reactive to light, Normal conjunctiva.            HENT:  Oral mucosa is moist.            Neck:  Supple.            Respiratory:  Respirations are non-labored.            Cardiovascular:  Normal rate, Regular rhythm, No edema.            Gastrointestinal:  Non-distended.            Musculoskeletal:  Normal gait.  Tender trigger points mid Trapezius.          Integumentary:  Warm, No rash.            Psychiatric:  Cooperative, Appropriate mood & affect, Normal judgment.             Assessment/Plan       1. H/O migraine (Z86.69)        Two trigger point(s) injected with 1cc of bupivacaine after cleansing area with alcohol, adhesive dressing applied. Follow up with neurologist as scheduled.       2. Myofascial pain (M79.18)         Continue with at home exercises to strengthen and stretch shoulder and neck muscles.      I, Sandie Rosenthal LPN, acted solely as a scribe for, and in the presence of Dr. Rick Mendoza who performed the services.  Patient Information     Name:KENYA HERNANDEZ      Address:      [address] [place] [id number]     Sex:Female     YOB: 1980     [phone]     Emergency Contact:[facility] EMERGENCY, CONTACT     MRN:[id number]     FIN:[id number]     Location:[facility]     Date of Service:07/03/2019      Primary Care Physician:       " López AGRAWAL, Samuel, [phone]      Attending Physician:       Rick Mendoza MD, [phone]  Problem List/Past Medical History    Ongoing     Anxiety disorder     Arthralgia     Chronic fatigue disorder     H/O migraine     Irritable bowel syndrome (IBS)     Stress incontinence in female     Tension headache    Historical     *Hospitalized@[facility] - Arthralgia of multiple joints     DVT - Deep vein thrombosis of lower limb     Infertility treatment     PCOD - Polycystic ovarian disease     Pregnancy     Protein S deficiency  Procedure/Surgical History     Colonoscopy (01/31/2014)            Comments: Normal..     Esophagogastroduodenoscopy (01/31/2014)           Laparoscopy (06/22/2012)           Appendectomy           Hernia repair           Intrauterine insemination           knee surgery x3        Medications     predniSONE 20 mg oral tablet: 2 tab(s), Oral, daily, for 7 day(s), 14 tab(s), 0 Refill(s).     Imodium A-D: 2 mg, Oral, q4 hrs, 0 Refill(s).     Tums: 500 mg, Chewed, daily, 0 Refill(s).     Lomotil 2.5 mg-0.025 mg oral tablet: 1 tab(s), po, q6 hrs, PRN: as needed for loose stool, 100 tab(s), 1 Refill(s).     imipramine 10 mg oral tablet: See Instructions, 1 tab(s) Oral qhs for 3 weeks, then increase to 2 tabs qhs, 60 tab(s), 5 Refill(s).      Allergies    Coumadin    Cymbalta (groggy)  Social History    Smoking Status - 06/29/2019     Never smoker     Alcohol      1 drink about once per week, 1 drinks/episode average. Alcohol use interferes with work or home: No. Drinks more than intended: No., 09/13/2017     Employment/School      Unemployed, Work/School description: homemaker., 09/13/2017     Exercise      Exercise frequency: 3-4 times/week. Exercise type: Cardio., 05/29/2012     Home/Environment      Marital status: . Lives with Children, Spouse., 09/13/2017      Marital status: . Spouse/Partner name: Amrik Bahena. Lives with Self, Children, Spouse. 2 children. Risks in  environment: Unlocked guns., 09/13/2017     Nutrition/Health      Type of diet: Regular, Gluten free, dairy free. Wants to lose weight: Yes. Sleeping concerns: Yes. Feels highly stressed: Yes., 09/13/2017     Sexual      Sexually active: Yes. Sexual orientation: Heterosexual., 08/05/2015     Substance Abuse      Never, 05/29/2012     Tobacco      Never, 05/29/2012  Family History    Diabetes mellitus: Mother.    Hearing aid: Daughter and Son.    Heart disease: Father.    Rheumatoid arthritis: Sister.    Systemic lupus erythematosus: Sister.    Thyroid disease: Mother.  Immunizations      Vaccine Date Status      Td 07/09/2009 Recorded      typhoid, inactivated 11/17/2008 Recorded      Hep A 11/17/2008 Recorded

## 2022-02-15 NOTE — TELEPHONE ENCOUNTER
---------------------  From: Samuel Dawn MD   To: KENYA HERNANDEZ    Sent: 1/29/2019 11:24:45 AM CST    Stool studies are normal.  No evidence of fat malabsorption.  Stool cultures also normal.     Results:  Date Result Name Value Ref Range   1/22/2019 8:40 AM Fecal Fat Ql NORMAL (NORMAL - )   1/22/2019 8:40 AM Clostridium difficile Toxin See comment    1/22/2019 8:40 AM Culture Campylobacter See comment    1/22/2019 8:40 AM Culture Salmonella/Shigella See comment    1/22/2019 8:40 AM Shiga Toxin See comment

## 2022-02-15 NOTE — TELEPHONE ENCOUNTER
---------------------  From: Ashleigh Phillips CMA (Phone Messages Pool ([id number]_[facility]))   To: FERNANDO Message Pool ([id number]_[place] [facility] [place]);     Sent: 1/18/2019 1:53:05 PM CST  Subject: FW: dr engle follow up           ---------------------  From: LAZARA BAHENA  To: [facility]  Sent: 01/18/2019 01:14 p.m. CST  Subject: dr engle follow up  Hello,  I got a phone message from Dr. Engle s nurse. After my appointment yesterday I was waiting to hear back about a medication prescription.  Just wondering what the status is?    Thanks  Lazara Bahena---------------------  From: Diana Ayala CMA   To: LAZARA BAHENA    Sent: 1/18/2019 3:29:14 PM CST  Subject: RE: dr engle follow up     Dr López De La Rosa is recommending starting on imipramine 10mg at bedtime for 3 weeks, then increase to 20mg at bedtime.  Please let us know if how this is working for you.      Also, hoping you were able to  all the containers for stool samples.      Diana

## 2022-02-15 NOTE — PROGRESS NOTES
"   Patient:   KENYA HERNANDEZ            MRN: [id number]            FIN: [id number]               Age:   37 years     Sex:  Female     :  1980   Associated Diagnoses:   Well woman exam; Abnormal weight gain; Female stress incontinence   Author:   Raquel Santos      Visit Information      Date of Service: 2017 03:56 pm  Performing Location: [facility]  Encounter#: [id number]      Primary Care Provider (PCP):  Samuel Dawn MD    NPI# [id number]      Referring Provider:  Sp Lu MD    NPI# [id number]   Visit type:  Annual exam.    Source of history:  Self.    History limitation:  None.       Chief Complaint   2017 3:59 PM CDT    Patient here for annual physical.        Well Adult History   Pt is here for annual exam.  She has multiple long-standing health issues.  Three concerns are most pressing today.  1) She has had bladder issues since her youngest child was born in .  She voids when she sneezes, coughs, or exercises.  She has tried kegel exercises with no relief.  She limits fluid intake, empties bladder often and wears adult diapers on some days.  2) She has chronic tension headaches which occur for about 4 hours duration, about 20 days per month.  Headaches usually start mid-day or in the evening.  Sometimes she wakes with them. Headaches always start with tension in shoulders and neck and radiate to head.  Has seen Dr MAX Dawn for this issue mulitple times.  She as tried dietary changes, massage, heat packs, chiropractic treatments, yoga, byron chi, new pillows.  Amytriptiline helped initially, but then just made her feel drunk and caused her to gain weight.   Ice packs and icey hot cream have been most effective.  She states she has is naturally an anxious person who just olvera through.  3) She has chronic diarrhea, diagnosed as IBS by Dr Dawn.  Her symptoms ebb and flow, but sometimes she goes for weeks without sx.  Other times she has \"violent diarrhea\" " multiple times between 6 am and 7 am.  Loperamide and gluten-free/dairy-free diet help.    She also notes issues with chronic fatigue (suspected fibromyalgia) and cold intolerance.  Her mother has Raynauds syndrome.  She needed IUI to get pregnant in 2013.  Not using contraception now.  Pregnancy would be welcome.  Her cycles are fairly regular, about 32 days, 4-5 days of heavy to mod flow, moderate cramps on first day.  She is  but sees her  only about 2 weekends per month.  He works for professional soccer team in [facility].            Review of Systems   ROS reviewed as documented in chart   ROS negative except as documented in Well Adult History      Health Status   Allergies:    Allergic Reactions (Selected)  Severity Not Documented  Coumadin (No reactions were documented)   Medications:  (Selected)   Prescriptions  Prescribed  loperamide 2 mg oral capsule: 1 cap(s) ( 2 mg ), PO, q4hr, PRN: for loose stools, # 60 cap(s), 0 Refill(s), Type: Maintenance, Pharmacy: [id number], 1 cap(s) po q4 hrs,PRN:for loose stools   Problem list:    All Problems  Anxiety disorder / SNOMED CT 3AW52C16-554K-84E0-GW59-APA59862H786 / Confirmed  Arthralgia / ICD-9-.40 / Confirmed  Chronic fatigue disorder / SNOMED CT 38K64281-1YY3-6373-QX87-X9TI26G55D72 / Confirmed  Irritable bowel syndrome (IBS) / SNOMED CT 0SLID125-59D8-284N-JM1N-DK965TRTWU8S / Confirmed  Stress incontinence in female / SNOMED CT 57268051-BRE3-158S-V6TP-1146263483L0 / Confirmed  Tension headache / SNOMED CT 0023168073 / Confirmed  Resolved: *Hospitalized@[facility] - Arthralgia of multiple joints  Resolved: DVT - Deep vein thrombosis of lower limb / SNOMED CT 5403798028  Resolved: Infertility treatment / SNOMED CT 4669747383  Resolved: PCOD - Polycystic ovarian disease / SNOMED CT 938534300  Resolved: Pregnancy / SNOMED CT 684711323  Resolved: Pregnant / SNOMED CT 803181850  Resolved: Protein S deficiency / SNOMED CT 1758318455       Histories   Past Medical History:    Active  Stress incontinence in female (52704593-XZE9-330W-Y4RI-7990210567D8): Onset in 2013 at 33 years.  Arthralgia (719.40)  Anxiety disorder (0RK72E65-272J-69R6-YE02-AJY71600I101)  Irritable bowel syndrome (IBS) (6ECRF942-02A3-132J-SL5Y-JV138KEQTO1D)  Chronic fatigue disorder (22P04106-4HA6-6863-NZ97-V4PK70Y27O00)  Tension headache (2908988317)  Resolved  *Hospitalized@[facility] - Arthralgia of multiple joints: Onset on 6/21/2010 at 29 years.  Resolved.  Pregnancy (998836102): Onset on 7/29/2006 at 26 years.  Resolved on 4/7/2007 at 26 years.  Protein S deficiency (1100594916):  Resolved.  DVT - Deep vein thrombosis of lower limb (4833806048):  Resolved.  PCOD - Polycystic ovarian disease (944595154):  Resolved.  Infertility treatment (3065119976):  Resolved.   Family History:    Hearing aid  Daughter  Son  Diabetes mellitus  Mother  Systemic lupus erythematosus  Sister  Rheumatoid arthritis  Sister  Heart disease  Father  Thyroid disease  Mother     Procedure history:    Colonoscopy (45.23) on 1/31/2014 at 33 Years.  Comments:  2/13/2014 2:15 PM - Diana Mahmood  Normal.  Esophagogastroduodenoscopy (184997972) on 1/31/2014 at 33 Years.  Laparoscopy (055193389) on 6/22/2012 at 31 Years.  Appendectomy (026257412).  Hernia repair (42577685).  knee surgery x3.  Intrauterine insemination (420630708).   Social History:        Alcohol Assessment            1 drink about once per week, 1 drinks/episode average.  Alcohol use interferes with work or home: No.  Drinks               more than intended: No.      Tobacco Assessment            Never      Substance Abuse Assessment            Never      Employment and Education Assessment            Unemployed, Work/School description: homemaker.      Home and Environment Assessment            Marital status: .  Lives with Children, Spouse.            Marital status: .  Spouse/Partner name: Amrik Bahena.  Lives with Self,  Children, Spouse.  2 children.               Risks in environment: Unlocked guns.      Nutrition and Health Assessment            Type of diet: Regular, Gluten free, dairy free.  Wants to lose weight: Yes.  Sleeping concerns: Yes.  Feels               highly stressed: Yes.      Exercise and Physical Activity Assessment            Exercise frequency: 3-4 times/week.  Exercise type: Cardio.      Sexual Assessment            Sexually active: Yes.  Sexual orientation: Heterosexual.        Physical Examination   Vital Signs   9/13/2017 3:59 PM CDT Temperature Tympanic 98.4 DegF    Peripheral Pulse Rate 69 bpm    Pulse Site Apical artery    HR Method Electronic    Systolic Blood Pressure 100 mmHg    Diastolic Blood Pressure 64 mmHg    Mean Arterial Pressure 76 mmHg    BP Site Right arm    BP Method Electronic      Measurements from flowsheet : Measurements   9/13/2017 3:59 PM CDT Height Measured - Standard 62 in    Weight Measured - Standard 133.4 lb    BSA 1.63 m2    Body Mass Index 24.4 kg/m2      General:  Alert and oriented, No acute distress.    Eye:  Normal conjunctiva, Vision unchanged.    HENT:  Normocephalic, Normal hearing, Oral mucosa is moist, No pharyngeal erythema.    Neck:  Supple, Non-tender, No lymphadenopathy, No thyromegaly.    Respiratory:  Lungs are clear to auscultation, Respirations are non-labored, Breath sounds are equal.    Cardiovascular:  Normal rate, Regular rhythm, No edema.    Breast:  No mass, No tenderness, No discharge.    Gastrointestinal:  Soft, Non-tender, Non-distended, Normal bowel sounds.    Musculoskeletal:  Normal range of motion, Normal strength.    Integumentary:  Warm, Dry, Intact, birthmark on lower back.    Neurologic:  Alert, Oriented.    Psychiatric:  Cooperative, Appropriate mood & affect.       Health Maintenance   Immunizations   due for flu vaccine      Review / Management   Results review      Impression and Plan   Diagnosis     Well woman exam (QCR32-BN Z01.419).      Abnormal weight gain (THD06-LG R63.5).     Female stress incontinence (THG90-EJ N39.3).     Plan   Not due for pap smear until August 2018.  Declines STI screen and contraception.  Refill loperamide.  See Dr De Los Santos if symptoms persist.  TSH and reflex Free T4 today due to cold intolerance and fatigue.  Referred to Dr Collins for evaluation of stress incontinence.  Discussed mindfulness based stress reduction or meditation to manage stress.  Looks for books, apps by Jon Cabot Zinn.

## 2022-02-15 NOTE — NURSING NOTE
CAGE Assessment Entered On:  9/10/2019 8:59 AM CDT    Performed On:  8/14/2019 8:59 AM CDT by Thalia Gomez CMA               Assessment   Have you ever felt you should cut down on your drinking :   No   Have people annoyed you by criticizing your drinking :   No   Have you ever felt bad or guilty about your drinking :   No   Have you ever taken a drink first thing in the morning to steady your nerves or get rid of a hangover (Eye-opener) :   No   CAGE Score :   0    Thalia Gomez CMA - 9/10/2019 8:59 AM CDT

## 2022-02-15 NOTE — PROGRESS NOTES
Patient:   KENYA HERNANDEZ            MRN: [id number]            FIN: [id number]               Age:   37 years     Sex:  Female     :  1980   Associated Diagnoses:   Acute recurrent maxillary sinusitis   Author:   Will Aguilar MD      Chief Complaint   3/3/2018 11:45 AM CST    Lost voice about 1 week ago.  Now cough, facial pain.  Denies fever.        History of Present Illness   Patient started with sore throat and hoarseness more than a week ago.  Then developed sinus pressure, headache. Low grade fevers.  Has tried NETI pot, decongestants, antihistamines, all without significant relief         Health Status   Allergies:    Allergic Reactions (All)  Severity Not Documented  Coumadin (No reactions were documented)  Cymbalta (Groggy)   Problem list:    All Problems (Selected)  Anxiety disorder / SNOMED CT 9HC97I01-188H-12Q1-FK92-QQB12434U468 / Confirmed  Chronic fatigue disorder / SNOMED CT 86A07218-7AP3-5763-JU28-A3XK35W37N78 / Confirmed  Irritable bowel syndrome (IBS) / SNOMED CT 0FHTW505-37C5-077H-FT3P-PG863TSQXO4K / Confirmed  Arthralgia / SNOMED CT 70740852 / Confirmed  Stress incontinence in female / SNOMED CT 14339237-DHJ5-403U-X2TJ-0257683318M3 / Confirmed  Tension headache / SNOMED CT 1411117508 / Confirmed      Histories   Past Medical History:    Active  Stress incontinence in female (SNOMED CT 23985722-ZEZ0-693B-F9LU-7814633934Z4): Onset in  at 33 years.  Arthralgia (SNOMED CT 34534483)  Anxiety disorder (SNOMED CT 6IQ67Z18-372A-62W0-KR38-LDI33224G403)  Irritable bowel syndrome (IBS) (SNOMED CT 9RCEA652-44N4-729I-MO4Y-HD122HJSCQ9X)  Chronic fatigue disorder (SNOMED CT 90K99611-9LT0-6000-CB53-P3TH93O79N50)  Tension headache (SNOMED CT 3813366966)  Resolved  *Hospitalized@[facility] - Arthralgia of multiple joints: Onset on 2010 at 29 years.  Resolved.  Pregnancy (SNOMED CT 919941861): Onset on 2006 at 26 years.  Resolved on 2007 at 26 years.  Protein S deficiency (SNOMED CT 5101920463):   Resolved.  DVT - Deep vein thrombosis of lower limb (SNOMED CT 3373183743):  Resolved.  PCOD - Polycystic ovarian disease (SNOMED CT 805399579):  Resolved.  Infertility treatment (SNOMED CT 6259822185):  Resolved.   Family History:    Hearing aid  Daughter  Son  Diabetes mellitus  Mother  Systemic lupus erythematosus  Sister  Rheumatoid arthritis  Sister  Heart disease  Father  Thyroid disease  Mother     Procedure history:    Colonoscopy (45.23) on 1/31/2014 at 33 Years.  Comments:  2/13/2014 2:15 PM - Diana Mahmood  Normal.  Esophagogastroduodenoscopy (853601673) on 1/31/2014 at 33 Years.  Laparoscopy (973035406) on 6/22/2012 at 31 Years.  Appendectomy (687248496).  Hernia repair (45718986).  knee surgery x3.  Intrauterine insemination (567452923).   Social History:        Alcohol Assessment            1 drink about once per week, 1 drinks/episode average.  Alcohol use interferes with work or home: No.  Drinks               more than intended: No.      Tobacco Assessment            Never      Substance Abuse Assessment            Never      Employment and Education Assessment            Unemployed, Work/School description: homemaker.      Home and Environment Assessment            Marital status: .  Lives with Children, Spouse.            Marital status: .  Spouse/Partner name: Amrik Bahena.  Lives with Self, Children, Spouse.  2 children.               Risks in environment: Unlocked guns.      Nutrition and Health Assessment            Type of diet: Regular, Gluten free, dairy free.  Wants to lose weight: Yes.  Sleeping concerns: Yes.  Feels               highly stressed: Yes.      Exercise and Physical Activity Assessment            Exercise frequency: 3-4 times/week.  Exercise type: Cardio.      Sexual Assessment            Sexually active: Yes.  Sexual orientation: Heterosexual.        Physical Examination   Vital Signs   3/3/2018 11:45 AM CST Temperature Tympanic 98.1 DegF    Peripheral Pulse  Rate 74 bpm    Systolic Blood Pressure 118 mmHg    Diastolic Blood Pressure 70 mmHg    Mean Arterial Pressure 86 mmHg    Oxygen Saturation 99 %      Measurements from flowsheet : Measurements   3/3/2018 11:45 AM CST    Weight Measured - Standard                126.2 lb     General:  Alert and oriented, No acute distress.    Eye:  Pupils are equal, round and reactive to light, Normal conjunctiva.    HENT:  Normocephalic, Tympanic membranes are clear, Oral mucosa is moist, No pharyngeal erythema.         Sinus: Bilateral, Maxillary sinus, Tenderness.    Neck:  Supple, Non-tender.    Respiratory:  Lungs are clear to auscultation.    Cardiovascular:  Normal rate, Regular rhythm.       Impression and Plan   Diagnosis     Acute recurrent maxillary sinusitis (FYI88-OU J01.01).     Course:  Progressing as expected.    Orders     Orders (Selected)   Prescriptions  Prescribed  amoxicillin 875 mg oral tablet: 1 tab(s) ( 875 mg ), PO, BID, # 20 tab(s), 0 Refill(s), Type: Maintenance, Pharmacy: [id number], 1 tab(s) po bid,x10 day(s).

## 2022-02-15 NOTE — NURSING NOTE
Depression Screening Entered On:  7/21/2021 1:41 PM CDT    Performed On:  7/21/2021 1:41 PM CDT by Diana Ayala CMA               Depression Screening   Little Interest - Pleasure in Activities :   Several days   Feeling Down, Depressed, Hopeless :   Not at all   Initial Depression Screen Score :   1 Score   Poor Appetite or Overeating :   Not at all   Trouble Falling or Staying Asleep :   Several days   Feeling Tired or Little Energy :   Several days   Feeling Bad About Yourself :   Not at all   Trouble Concentrating :   Not at all   Moving or Speaking Slowly :   Not at all   Thoughts Better Off Dead or Hurting Self :   Not at all   Difficulty at Work, Home, Getting Along :   Not difficult at all   Detailed Depression Screen Score :   2    Total Depression Screen Score :   3    Diana Ayala CMA - 7/21/2021 1:41 PM CDT

## 2022-02-15 NOTE — PROGRESS NOTES
Chief Complaint    patient here today with a migraine present for 4 days.  History of Present Illness      Patient is here with complaints of right-sided migraine for the last 4 days.  She describes as throbbing in nature.  She has mild nausea.  Denies visual changes.  This is similar to previous migraine headaches.  She was treated in September of last year for similar symptoms with ketorolac and prednisone.  He sees her neurologist on a regular basis.  She receives Botox injections.  Review of Systems      See HPI.  All other review of systems negative.  Physical Exam   Vitals & Measurements    HR: 101(Peripheral)  BP: 102/60  SpO2: 98%     WT: 119.4 lb       Alert, oriented, no acute distress      Ear canals are patent, TMs clear      Normal extraocular movements, pupils are equal reactive to light       Mild right scalp tenderness       Neck is supple       Normal heart rate       Nonlabored breathing          Assessment/Plan       Migraine without aura, not refractory (G43.009)         Treat with ketorolac 60 mg IM today and prednisone 40 mg daily for a week.  Discussed abortive therapy, trigger point injections, and neurologic follow-up.         Ordered:          ketorolac, 60 mg, im, once, (Completed)          predniSONE, = 2 tab(s), Oral, daily, x 7 day(s), # 14 tab(s), 0 Refill(s), Type: Physician Stop, Pharmacy: [facility], 2 tab(s) Oral daily, (Ordered)          95890 therapeutic prophylactic/dx injection subq/im (Charge), Quantity: 1, Migraine without aura, not refractory          94078 therapeutic prophylactic/dx injection subq/im (Charge), Quantity: 1, Migraine without aura, not refractory           ketorolac tromethamine inj, 15 mg (Charge), Quantity: 4, Migraine without aura, not refractory           ketorolac tromethamine inj, 15 mg (Charge), Quantity: 4, Migraine without aura, not refractory           Patient Information     Name:KENYA HERNANDEZ      Address:      [address]  [place] [address]     Sex:Female     YOB: 1980     [phone]     Emergency Contact:[facility], CONTACT     MRN:[id number]     FIN:[id number]     Location:[facility]     Date of Service:06/29/2019      Primary Care Physician:       Samuel Dawn MD, [phone]      Attending Physician:       Rick Mendoza MD, [phone]  Problem List/Past Medical History    Ongoing     Anxiety disorder     Arthralgia     Chronic fatigue disorder     Irritable bowel syndrome (IBS)     Stress incontinence in female     Tension headache    Historical     *Hospitalized@[facility] - Arthralgia of multiple joints     DVT - Deep vein thrombosis of lower limb     Infertility treatment     PCOD - Polycystic ovarian disease     Pregnancy     Protein S deficiency  Procedure/Surgical History     Colonoscopy (01/31/2014)      Comments: Normal..     Esophagogastroduodenoscopy (01/31/2014)     Laparoscopy (06/22/2012)     Appendectomy     Hernia repair     Intrauterine insemination     knee surgery x3  Medications        predniSONE 20 mg oral tablet: 2 tab(s), Oral, daily, for 7 day(s), 14 tab(s), 0 Refill(s).        tiZANidine: Oral, prn, Rx'd by Dr. Vogel, 0 Refill(s).        Imodium A-D: 2 mg, Oral, q4 hrs, 0 Refill(s).        Tums: 500 mg, Chewed, daily, 0 Refill(s).        Lomotil 2.5 mg-0.025 mg oral tablet: 1 tab(s), po, q6 hrs, PRN: as needed for loose stool, 100 tab(s), 1 Refill(s).        imipramine 10 mg oral tablet: See Instructions, 1 tab(s) Oral qhs for 3 weeks, then increase to 2 tabs qhs, 60 tab(s), 5 Refill(s).         Allergies    Coumadin    Cymbalta (groggy)  Social History    Smoking Status - 06/29/2019     Never smoker     Alcohol      1 drink about once per week, 1 drinks/episode average. Alcohol use interferes with work or home: No. 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 Exercise type: Cardio., 05/29/2012     Home/Environment      Marital status: . Lives with Children, Spouse., 09/13/2017      Marital status: . Spouse/Partner name: Amrik Bahena. Lives with Self, Children, Spouse. 2 children. Risks in environment: Unlocked guns., 09/13/2017     Nutrition/Health      Type of diet: Regular, Gluten free, dairy free. Wants to lose weight: Yes. Sleeping concerns: Yes. Feels highly stressed: Yes., 09/13/2017     Sexual      Sexually active: Yes. Sexual orientation: Heterosexual., 08/05/2015     Substance Abuse      Never, 05/29/2012     Tobacco      Never, 05/29/2012  Family History    Diabetes mellitus: Mother.    Hearing aid: Daughter and Son.    Heart disease: Father.    Rheumatoid arthritis: Sister.    Systemic lupus erythematosus: Sister.    Thyroid disease: Mother.  Immunizations      Vaccine Date Status      Td 07/09/2009 Recorded      typhoid, inactivated 11/17/2008 Recorded      Hep A 11/17/2008 Recorded

## 2022-02-15 NOTE — PROGRESS NOTES
Chief Complaint    patient here today with shortness of breath and chest tightness. Had botox injections close to lungs on 7/16.  History of Present Illness      39-year-old here with shortness of breath.       Patient states for last couple days she is felt like she cannot catch her breath.  Her  noted she was panting when she was walking around she says she just feels uncomfortable.  She is especially concerned because she had Botox a couple weeks ago.  Botox has worked terrifically for her headaches but they mention that they could ask and have made her to lungs so she has been thinking about that and worried that is what happened.       She has not had any fever or chills.  She denies any chest pressure but says it up high there is been a little tightness when she tries to catch her breath more.  She has not had any any cough.  No tingling in her extremities.  No swelling  Review of Systems      See HPI.  All other review of systems negative.  Physical Exam   Vitals & Measurements    T: 98.0   F (Tympanic)  HR: 82(Peripheral)  RR: 12  BP: 100/58  SpO2: 96%     WT: 12.4 lb       General: Alert and oriented, No acute distress.      Eye: Pupils are equal, round and reactive to light, Normal conjunctiva.      HENT: Oral mucosa is moist.      Neck: Supple.      Respiratory: Respirations are non-labored.  Clear to auscultation      Cardiovascular: Normal rate, Regular rhythm, No edema.      Gastrointestinal: Non-distended.      Musculoskeletal: Normal gait.      Integumentary: Warm, No rash.      Psychiatric: Cooperative, Appropriate mood & affect, Normal judgment  Assessment/Plan       1. Air hunger (R09.89)         I reviewed her EKG that it is normal.  She has a negative d-dimer.  Normal electrolytes and CBC and a negative troponin.  Chest x-ray is unremarkable.        I discussed her history of anxiety however she feels like that was over diagnosed and is really her headaches that made her so worried.  She  does not feel anxious at this time.  Told her she has signs of air hunger that it is likely just to calm down on his own if she can relax and try to occupy herself with other activities.  He does not appear to be any dangerous cause for her breathing but we can certainly relook if she is getting any worse or having new symptoms                Shortness of breath (R06.02)         Ordered:          46740 ecg routine ecg w/least 12 lds w/i+r (Charge), Quantity: 1, Shortness of breath          Basic Metabolic Panel (Request), Priority: Urgent, Shortness of breath          CBC w/o Diff (Request), Priority: Urgent, Shortness of breath          D-Dimer (Request), Priority: Urgent, Shortness of breath          Troponin-I (Request), Priority: Urgent, Shortness of breath          XR Chest PA/Lat (Request), Shortness of breath           Patient Information     Name:KENYA HERNANDEZ      Address:      [address] [place] [id number]     Sex:Female     YOB: 1980     [phone]     Emergency Contact:[facility], CONTACT     MRN:[id number]     FIN:[id number]     Location:[facility]     Date of Service:07/28/2019      Primary Care Physician:       Samuel Dawn MD, [phone]      Attending Physician:       Dale Malagon MD, [phone]  Problem List/Past Medical History    Ongoing     Anxiety disorder     Arthralgia     Chronic fatigue disorder     H/O migraine     Irritable bowel syndrome (IBS)     Stress incontinence in female     Tension headache    Historical     *Hospitalized@[facility] - Arthralgia of multiple joints     DVT - Deep vein thrombosis of lower limb     Infertility treatment     PCOD - Polycystic ovarian disease     Pregnancy     Protein S deficiency  Procedure/Surgical History     Colonoscopy (01/31/2014)      Comments: Normal..     Esophagogastroduodenoscopy (01/31/2014)     Laparoscopy (06/22/2012)     Appendectomy     Hernia repair     Intrauterine  insemination     knee surgery x3  Medications    imipramine 10 mg oral tablet, See Instructions, 5 refills,   Not taking    Imodium A-D, 2 mg, Oral, q4 hrs    Lomotil 2.5 mg-0.025 mg oral tablet, 1 tab(s), Oral, q6 hrs, PRN, 1 refills    Tums, 500 mg, Chewed, daily  Allergies    Coumadin    Cymbalta (groggy)  Social History    Smoking Status - 07/28/2019     Never smoker     Alcohol      1 drink about once per week, 1 drinks/episode average. Alcohol use interferes with work or home: No. Drinks more than intended: No., 09/13/2017     Employment/School      Unemployed, Work/School description: homemaker., 09/13/2017     Exercise      Exercise frequency: 3-4 times/week. Exercise type: Cardio., 05/29/2012     Home/Environment      Marital status: . Lives with Children, Spouse., 09/13/2017      Marital status: . Spouse/Partner name: Amrik Bahena. Lives with Self, Children, Spouse. 2 children. Risks in environment: Unlocked guns., 09/13/2017     Nutrition/Health      Type of diet: Regular, Gluten free, dairy free. Wants to lose weight: Yes. Sleeping concerns: Yes. Feels highly stressed: Yes., 09/13/2017     Sexual      Sexually active: Yes. Sexual orientation: Heterosexual., 08/05/2015     Substance Abuse      Never, 05/29/2012     Tobacco      Never, 05/29/2012  Family History    Diabetes mellitus: Mother.    Hearing aid: Daughter and Son.    Heart disease: Father.    Rheumatoid arthritis: Sister.    Systemic lupus erythematosus: Sister.    Thyroid disease: Mother.  Immunizations      Vaccine Date Status      Td 07/09/2009 Recorded      typhoid, inactivated 11/17/2008 Recorded      Hep A 11/17/2008 Recorded  Lab Results          Lab Results (Last 4 results within 90 days)           Sodium Level: 140 [135 mmol/L - 145 mmol/L] (07/28/19 14:44:00)          Potassium Level: 3.7 [3.5 mmol/L - 5 mmol/L] (07/28/19 14:44:00)          Chloride Level: 103 [98 mmol/L - 110 mmol/L] (07/28/19 14:44:00)          CO2  Level: 26 [21 mmol/L - 31 mmol/L] (07/28/19 14:44:00)          AGAP: 11 [5  - 18] (07/28/19 14:44:00)          Glucose Level: 87 [65 mg/dL - 100 mg/dL] (07/28/19 14:44:00)          BUN: 15 [8 mg/dL - 25 mg/dL] (07/28/19 14:44:00)          Creatinine Level: 0.81 [0.57 mg/dL - 1.11 mg/dL] (07/28/19 14:44:00)          BUN/Creat Ratio: 19 [10  - 20] (07/28/19 14:44:00)          eGFR : >60 (07/28/19 14:44:00)          eGFR Non-: >60 (07/28/19 14:44:00)          Calcium Level: 9.6 [8.5 mg/dL - 10.5 mg/dL] (07/28/19 14:44:00)          Troponin I: <0.010 (07/28/19 14:44:00)          WBC: 14.7 High [4.5  - 11] (07/28/19 14:44:00)          RBC: 4.59 [4  - 5.2] (07/28/19 14:44:00)          Hgb: 14.6 [12 g/dL - 16 g/dL] (07/28/19 14:44:00)          Hct: 42.2 [33 % - 51 %] (07/28/19 14:44:00)          MCV: 92 [80 fL - 100 fL] (07/28/19 14:44:00)          MCH: 31.8 [26 pg - 34 pg] (07/28/19 14:44:00)          MCHC: 34.6 [32 g/dL - 36 g/dL] (07/28/19 14:44:00)          RDW: 13.3 [11.5 % - 15.5 %] (07/28/19 14:44:00)          Platelet: 327 [140  - 440] (07/28/19 14:44:00)          MPV: 9.2 [6.5 fL - 11 fL] (07/28/19 14:44:00)          D-Dimer: <0.27 (07/28/19 14:44:00)

## 2022-02-15 NOTE — PROGRESS NOTES
Patient:   KENYA HERNANDEZ            MRN: [id number]            FIN: [id number]               Age:   37 years     Sex:  Female     :  1980   Associated Diagnoses:   Mixed incontinence   Author:   Vaughn Collins MD      Visit Information      Date of Service: 2017 03:47 pm  Performing Location: [facility]  Encounter#: [id number]      Primary Care Provider (PCP):  López AGRAWAL, Samuel    NPI# [id number]      Referring Provider:  Samuel Dawn MD, NPI# [id number]      Chief Complaint   2017 3:51 PM CDT    Consult per KMP to discuss stress incontinence since .  Worse in last 6-8 months with increased urgency.      Interval History   The patient is in today referred by WAYNE Persaud, for urinary incontinence.  The patient has had vaginal deliveries in  and more recently May of 2013.  She has had some stress incontinence since her second delivery which has been persistent.  She did have some short-term incontinence after her first baby but that resolved over time.  The urgency incontinence has been mixed in after the second baby and has been quite a bit worse over the past six to eight months.  Simultaneously she has had pelvic discomfort which has been attributed to irritable bowel syndrome.  She has been on a lactose-free and gluten free diet without much resolution in her discomfort and loose stools.  She has loose stools every morning between 5:00 and 7:00 a.m.  She has a lot of urgency of urination and has to have her person driving or herself stop frequently on a sudden basis.  She has no pain with intercourse.  Menses seem normal.        Review of Systems   Review  of systems is negative except as documented under interval history.      Health Status   Allergies:    Allergic Reactions (Selected)  Severity Not Documented  Coumadin (No reactions were documented)   Medications:  (Selected)   Prescriptions  Prescribed  DULoxetine 30 mg oral delayed release capsule: See  Instructions, Instructions: 1 cap(s) po daily for 30 days, then increase to 2 caps daily, # 60 cap(s), 5 Refill(s), Type: Maintenance, Pharmacy: [id number], 1 cap(s) po daily for 30 days, then increase to 2 caps daily  loperamide 2 mg oral capsule: 1 cap(s) ( 2 mg ), PO, q4hr, PRN: for loose stools, # 60 cap(s), 0 Refill(s), Type: Maintenance, Pharmacy: [id number] [id number], 1 cap(s) po q4 hrs,PRN:for loose stools   Problem list:    All Problems  Arthralgia / SNOMED CT 44794516 / Confirmed  Anxiety disorder / SNOMED CT [id number]-71J5-[id number] / Confirmed  Irritable bowel syndrome (IBS) / SNOMED CT 1TNAT802-84D7-931G-KW9I-AZ783XSKFN4T / Confirmed  Stress incontinence in female / SNOMED CT 63959584-LHP9-559Z-[id number]-4699325726P2 / Confirmed  Chronic fatigue disorder / SNOMED CT 40B00930-1HS2-8249-AL81-M9TK33Q22D08 / Confirmed  Tension headache / SNOMED CT 6691599815 / Confirmed  Resolved: Protein S deficiency / SNOMED CT 5722993548  Resolved: DVT - Deep vein thrombosis of lower limb / SNOMED CT 2334058968  Resolved: PCOD - Polycystic ovarian disease / SNOMED CT 251819715  Resolved: Infertility treatment / SNOMED CT 5096392813  Resolved: *Hospitalized@[facility] - Arthralgia of multiple joints  Resolved: Pregnancy / SNOMED CT 592427747  Resolved: Pregnant / SNOMED CT 886996679      Histories   Past Medical History:    Active  Stress incontinence in female (03462254-WZC5-246T-[id number]): Onset in 2013 at 33 years.  Arthralgia (23644108)  Anxiety disorder (0OI55G01-291X-69F7-WD27-FHL68070V463)  Irritable bowel syndrome (IBS) (0RAAF480-62Y4-771I-JK0O-HY171LAPNG0D)  Chronic fatigue disorder (68I99356-7IA1-8762-WW43-P1SM78T73N71)  Tension headache (0249610706)  Resolved  *Hospitalized@[facility] - Arthralgia of multiple joints: Onset on 6/21/2010 at 29 years.  Resolved.  Pregnancy (985996589): Onset on 7/29/2006 at 26 years.  Resolved on 4/7/2007 at 26 years.  Protein S deficiency (0453349129):   Resolved.  DVT - Deep vein thrombosis of lower limb (3191788134):  Resolved.  PCOD - Polycystic ovarian disease (756900075):  Resolved.  Infertility treatment (0070415815):  Resolved.   Family History:    Hearing aid  Daughter  Son  Diabetes mellitus  Mother  Systemic lupus erythematosus  Sister  Rheumatoid arthritis  Sister  Heart disease  Father  Thyroid disease  Mother     Procedure history:    Colonoscopy (45.23) on 1/31/2014 at 33 Years.  Comments:  2/13/2014 2:15 PM - Diana Mahmood  Normal.  Esophagogastroduodenoscopy (031724901) on 1/31/2014 at 33 Years.  Laparoscopy (923274829) on 6/22/2012 at 31 Years.  Appendectomy (678453322).  Hernia repair (43527326).  knee surgery x3.  Intrauterine insemination (113055685).   Social History:        Alcohol Assessment            1 drink about once per week, 1 drinks/episode average.  Alcohol use interferes with work or home: No.  Drinks               more than intended: No.      Tobacco Assessment            Never      Substance Abuse Assessment            Never      Employment and Education Assessment            Unemployed, Work/School description: homemaker.      Home and Environment Assessment            Marital status: .  Lives with Children, Spouse.            Marital status: .  Spouse/Partner name: Amrik Bahena.  Lives with Self, Children, Spouse.  2 children.               Risks in environment: Unlocked guns.      Nutrition and Health Assessment            Type of diet: Regular, Gluten free, dairy free.  Wants to lose weight: Yes.  Sleeping concerns: Yes.  Feels               highly stressed: Yes.      Exercise and Physical Activity Assessment            Exercise frequency: 3-4 times/week.  Exercise type: Cardio.      Sexual Assessment            Sexually active: Yes.  Sexual orientation: Heterosexual.        Physical Examination   Vital Signs   9/20/2017 3:51 PM CDT Peripheral Pulse Rate 86 bpm    HR Method Electronic    Systolic Blood Pressure 113  mmHg    Diastolic Blood Pressure 78 mmHg    Mean Arterial Pressure 90 mmHg    BP Site Right arm    BP Method Electronic      Gynecologic:  Bimanual examination finds good pelvic support.  There is no descent of the urethrovesical angle.  There are no pelvic masses.  There seems to be some slight increased pelvic tone but she is able to relax her pelvic floor and do her pelvic floor tightening on command..       Impression and Plan   Diagnosis     Mixed incontinence (HMT68-EC N39.46).     Mixed urinary incontinence.  No obvious anatomic component.  Probable hyperactive bladder which is most likely intertwined with her irritable bowel symptoms..     Plan:  The patient will continue with her attempts at seeing if there are any dietary improvements for her.  She is referred to physical therapy for pelvic floor exercises that may help with her increased bladder tone.  We talked about timed voiding over the next couple of weeks to regain some bladder control in the meantime.  She will see how things go with physical therapy.  She was told that there are medications for hyperactive bladder, which I would not recommend until she goes through physical therapy evaluation..    Patient Instructions:       Counseled: Patient, Regarding diagnosis, Regarding treatment, Regarding medications, Verbalized understanding, Total time with the patient today is 20 minutes all in face-to-face counseling..

## 2022-02-15 NOTE — NURSING NOTE
Comprehensive Intake Entered On:  3/4/2021 9:44 AM CST    Performed On:  3/4/2021 9:39 AM CST by Libra Mohr LPN               Summary   Chief Complaint :   sore throat that started yesterday, would like to be COVID tested so she can return to work - verbal consent for video on smartphone   Menstrual Status :   Menarcheal   Race :      Languages :   English   Ethnicity :   Not  or    Libra Mohr LPN - 3/4/2021 9:39 AM CST   Health Status   Allergies Verified? :   Yes   Medication History Verified? :   Yes   Medical History Verified? :   No   Pre-Visit Planning Status :   Completed   Tobacco Use? :   Never smoker   Libra Mohr LPN - 3/4/2021 9:39 AM CST   Meds / Allergies   (As Of: 3/4/2021 9:44:43 AM CST)   Allergies (Active)   Coumadin  Estimated Onset Date:   Unspecified ; Created By:   Rosario Dela Cruz; Reaction Status:   Active ; Category:   Drug ; Substance:   Coumadin ; Type:   Allergy ; Updated By:   Rosario Dela Cruz; Source:   Paper Chart ; Reviewed Date:   7/3/2019 8:32 AM CDT      Cymbalta  Estimated Onset Date:   Unspecified ; Reactions:   groggy ; Created By:   Diana Ayala CMA; Reaction Status:   Active ; Category:   Drug ; Substance:   Cymbalta ; Type:   Allergy ; Updated By:   Diana Ayala CMA; Reviewed Date:   7/3/2019 8:32 AM CDT        Medication List   (As Of: 3/4/2021 9:44:43 AM CST)   Home Meds    nortriptyline  :   nortriptyline ; Status:   Documented ; Ordered As Mnemonic:   nortriptyline 10 mg oral capsule ; Simple Display Line:   50 mg, 5 cap(s), Oral, hs, 0 Refill(s) ; Catalog Code:   nortriptyline ; Order Dt/Tm:   3/4/2021 9:41:48 AM CST            ID Risk Screen   Recent Travel History :   No recent travel   Family Member Travel History :   No recent travel   Other Exposure to Infectious Disease :   Unknown   COVID-19 Testing Status :   No positive COVID-19 test   Libra Mohr LPN - 3/4/2021 9:39 AM CST   Social History   Social History    (As Of: 3/4/2021 9:44:44 AM CST)   Alcohol:  Current      1 drink about once per week, Wine (5 oz), 1-2 times per month, 1 drinks/episode average.  Alcohol use interferes with work or home: No.  Drinks more than intended: No.  Ready to change: No.   (Last Updated: 9/12/2019 1:09:06 PM CDT by Diana Mahmood)          Tobacco:        Never (less than 100 in lifetime)   (Last Updated: 3/4/2021 9:42:12 AM CST by Libra Mohr LPN)          Electronic Cigarette/Vaping:        Electronic Cigarette Use: Never.   (Last Updated: 3/4/2021 9:42:18 AM CST by Libra Mohr LPN)          Substance Abuse:  Denies Substance Abuse      Never   (Last Updated: 5/29/2012 3:19:24 PM CDT by Avani Nation)          Employment/School:        Work/School description: .  Highest education level: College.   (Last Updated: 9/12/2019 1:10:29 PM CDT by Diana Mahmood)          Home/Environment:        Marital status: .  Spouse/Partner name: Amrik Bahena.  Lives with Self, Children, Spouse.  2 children.  Risks in environment: Unlocked guns.   (Last Updated: 9/13/2017 7:38:27 PM CDT by Raquel Santos)   Marital status: .  Lives with Children, Spouse.  Living situation: Home/Independent.  Injuries/Abuse/Neglect in household: No.  Feels unsafe at home: No.  Family/Friends available for support: Yes.   Comments:  9/13/2017 7:39 PM - Raquel Santos:  works for [employer]. 8/25/2010 7:23 PM - Ned Cary MD:  works for [employer]   (Last Updated: 9/12/2019 1:09:58 PM CDT by Diana Mahmood)          Nutrition/Health:        Type of diet: Regular, Gluten free, dairy free.  Wants to lose weight: Yes.  Sleeping concerns: Yes.  Feels highly stressed: Yes.   (Last Updated: 9/13/2017 7:42:10 PM CDT by Raquel Santos)          Exercise:        Exercise frequency: 3-4 times/week.  Exercise type: Cardio.   (Last Updated: 5/29/2012 3:20:01 PM CDT by  Avani Nation)   Exercise frequency: 1-2 times/week.   (Last Updated: 9/12/2019 1:14:07 PM CDT by Diana Mahmood)          Sexual:        Sexually active: Yes.  Sexual orientation: Heterosexual.   (Last Updated: 8/5/2015 2:50:20 PM CDT by Jaqui Beckwith MA)   Identifies as female, History of STD: No.  Uses condoms: Yes.  History of sexual abuse: No.   (Last Updated: 9/12/2019 1:13:06 PM CDT by Diana Mahmood)          Other:        Irregular menses.  Painful periods.   (Last Updated: 9/12/2019 1:17:15 PM CDT by Diana Mahmood)

## 2022-02-15 NOTE — NURSING NOTE
Comprehensive Intake Entered On:  7/3/2019 8:34 AM CDT    Performed On:  7/3/2019 8:27 AM CDT by Sandie Rosenthal LPN               Summary   Chief Complaint :   Seen in urgent care over the weekend for migraine. Would like trigger pain injections.    Menstrual Status :   Menarcheal   Weight Measured :   119 lb(Converted to: 119 lb 0 oz, 53.98 kg)    Height Measured :   63 in(Converted to: 5 ft 3 in, 160.02 cm)    Body Mass Index :   21.08 kg/m2   Body Surface Area :   1.55 m2   Systolic Blood Pressure :   102 mmHg   Diastolic Blood Pressure :   64 mmHg   Mean Arterial Pressure :   77 mmHg   Peripheral Pulse Rate :   76 bpm   BP Site :   Right arm   Pulse Site :   Radial artery   BP Method :   Manual   HR Method :   Manual   Temperature Tympanic :   97.3 DegF(Converted to: 36.3 DegC)  (LOW)    Race :      Languages :   English   Ethnicity :   Not  or    Sandie Rosenthal LPN - 7/3/2019 8:27 AM CDT   Health Status   Allergies Verified? :   Yes   Medication History Verified? :   Yes   Pre-Visit Planning Status :   Completed   Sandie Rosenthal LPN - 7/3/2019 8:27 AM CDT   Consents   Consent for Immunization Exchange :   Consent Granted   Consent for Immunizations to Providers :   Consent Granted   Sandie Rosenthal LPN - 7/3/2019 8:27 AM CDT   Meds / Allergies   (As Of: 7/3/2019 8:34:02 AM CDT)   Allergies (Active)   Coumadin  Estimated Onset Date:   Unspecified ; Created By:   Rosario Dela Cruz; Reaction Status:   Active ; Category:   Drug ; Substance:   Coumadin ; Type:   Allergy ; Updated By:   Rosario Dela Cruz; Source:   Paper Chart ; Reviewed Date:   7/3/2019 8:32 AM CDT      Cymbalta  Estimated Onset Date:   Unspecified ; Reactions:   groggy ; Created By:   Diana Ayala CMA; Reaction Status:   Active ; Category:   Drug ; Substance:   Cymbalta ; Type:   Allergy ; Updated By:   Diana Ayala CMA; Reviewed Date:   7/3/2019 8:32 AM CDT        Medication List   (As Of: 7/3/2019 8:34:02 AM  CDT)   Prescription/Discharge Order    predniSONE  :   predniSONE ; Status:   Prescribed ; Ordered As Mnemonic:   predniSONE 20 mg oral tablet ; Simple Display Line:   2 tab(s), Oral, daily, for 7 day(s), 14 tab(s), 0 Refill(s) ; Ordering Provider:   Rick Mendoza MD; Catalog Code:   predniSONE ; Order Dt/Tm:   6/29/2019 9:59:49 AM          imipramine  :   imipramine ; Status:   Prescribed ; Ordered As Mnemonic:   imipramine 10 mg oral tablet ; Simple Display Line:   See Instructions, 1 tab(s) Oral qhs for 3 weeks, then increase to 2 tabs qhs, 60 tab(s), 5 Refill(s) ; Ordering Provider:   Samuel Dawn MD; Catalog Code:   imipramine ; Order Dt/Tm:   1/17/2019 9:11:29 PM          atropine-diphenoxylate  :   atropine-diphenoxylate ; Status:   Prescribed ; Ordered As Mnemonic:   Lomotil 2.5 mg-0.025 mg oral tablet ; Simple Display Line:   1 tab(s), po, q6 hrs, PRN: as needed for loose stool, 100 tab(s), 1 Refill(s) ; Ordering Provider:   Samuel Dawn MD; Catalog Code:   atropine-diphenoxylate ; Order Dt/Tm:   1/17/2019 6:30:46 PM            Home Meds    tiZANidine  :   tiZANidine ; Status:   Completed ; Ordered As Mnemonic:   tiZANidine ; Simple Display Line:   Oral, prn, Rx'd by Dr. Vogel, 0 Refill(s) ; Catalog Code:   tiZANidine ; Order Dt/Tm:   9/8/2018 12:54:45 PM          loperamide  :   loperamide ; Status:   Documented ; Ordered As Mnemonic:   Imodium A-D ; Simple Display Line:   2 mg, Oral, q4 hrs, 0 Refill(s) ; Catalog Code:   loperamide ; Order Dt/Tm:   1/17/2019 5:49:15 PM          calcium carbonate  :   calcium carbonate ; Status:   Documented ; Ordered As Mnemonic:   Tums ; Simple Display Line:   500 mg, Chewed, daily, 0 Refill(s) ; Catalog Code:   calcium carbonate ; Order Dt/Tm:   1/17/2019 5:49:19 PM

## 2022-02-15 NOTE — PROGRESS NOTES
Patient:   KENYA HERNANDEZ            MRN: [id number]            FIN: [id number]               Age:   37 years     Sex:  Female     :  1980   Associated Diagnoses:   Neck pain; Migraines   Author:   Dale Malagon MD      Chief Complaint   2018 4:12 PM CDT    Hedaches- wanted to discuss botox. Not able to go to work due to pain      History of Present Illness   see chief complaint as noted above and confirmed with the patient   The patient is a 37-year-old who suffers from neck pain and chronic migraines.  She had some trigger points done last fall that helped and then she had medial branch block using lidocaine in November bilaterally and that provided wonderful relief for several months.  In the past she did not do well with tricyclics and she developed syncope on Cymbalta and she did not do well with gabapentin.  She has tried massage and chiropractic care.  She is looking for relief because the pain is keeping her from doing normal activities and she even declined a summer job because she did not think she could work with any reliability.  Patient denies any fever or chills.  No rashes.   She has had no visual difficulties.  No weakness in her upper extremities.           Review of Systems   Constitutional:  No fever, No chills.    Eye   Ear/Nose/Mouth/Throat:  No nasal congestion, No sore throat.    Respiratory:  No shortness of breath, No cough.    Cardiovascular   Breast   Gastrointestinal:  No nausea, No vomiting, No diarrhea, No constipation.    Genitourinary:  No dysuria.    Gynecologic   Hematology/Lymphatics:  No bruising tendency, No swollen lymph glands.    Endocrine   Immunologic:  No recurrent fevers, No recurrent infections.    Musculoskeletal:  No muscle pain.    Integumentary:  No rash.    Neurologic:  No tingling, No headache.    Psychiatric   All other systems.     Health Status   Allergies:    Allergic Reactions (Selected)  Severity Not Documented  Coumadin (No reactions were  documented)  Cymbalta (Groggy)   Medications:  (Selected)   Prescriptions  Prescribed  predniSONE 20 mg oral tablet: 2 tab(s) ( 40 mg ), PO, Daily, # 14 tab(s), 0 Refill(s), Type: Maintenance, Pharmacy: [id number], 2 tab(s) Oral daily,x7 day(s),    Medications          *denotes recorded medication          predniSONE 20 mg oral tablet: 40 mg, 2 tab(s), PO, Daily, for 7 day(s), 14 tab(s), 0 Refill(s).     Problem list:    All Problems (Selected)  Arthralgia / [id number] / Confirmed  Anxiety disorder / [id number] / Confirmed  Irritable bowel syndrome (IBS) / [id number] / Confirmed  Stress incontinence in female / [id number] / Confirmed  Chronic fatigue disorder / [id number] / Confirmed  Tension headache / 9230872292 / Confirmed      Histories   Past Medical History:    Active  Stress incontinence in female (95038930-[id number]): Onset in 2013 at 33 years.  Arthralgia (76653138)  Anxiety disorder ([id number]-66T6-[id number])  Irritable bowel syndrome (IBS) (1GVSD208-47V7-111C-DW7R-RP736PUQVO3Y)  Chronic fatigue disorder (92E34097-2WS2-8263-IV43-G3YL33N24G36)  Tension headache (5500896050)  Resolved  *Hospitalized@[facility] - Arthralgia of multiple joints: Onset on 6/21/2010 at 29 years.  Resolved.  Pregnancy (034495117): Onset on 7/29/2006 at 26 years.  Resolved on 4/7/2007 at 26 years.  Protein S deficiency (9248086070):  Resolved.  DVT - Deep vein thrombosis of lower limb (1960060247):  Resolved.  PCOD - Polycystic ovarian disease (527574270):  Resolved.  Infertility treatment (3394434974):  Resolved.   Family History:    Hearing aid  Daughter  Son  Diabetes mellitus  Mother  Systemic lupus erythematosus  Sister  Rheumatoid arthritis  Sister  Heart disease  Father  Thyroid disease  Mother     Procedure history:    Colonoscopy (ICD-9-CM 45.23) performed by Wayne Butts MD on  1/31/2014 at 33 Years.  Comments:  2/13/2014 2:15 PM - Diana Mahmood  Normal.  Esophagogastroduodenoscopy (SNOMED CT 939216496) performed by Wayne Butts MD on 1/31/2014 at 33 Years.  Laparoscopy (SNOMED CT 754661893) performed by Trey Pederson MD on 6/22/2012 at 31 Years.  Appendectomy (SNOMED CT 279832734).  Hernia repair (SNOMED CT 15025424).  knee surgery x3.  Intrauterine insemination (SNOMED CT 095175508).   Social History:        Alcohol Assessment            1 drink about once per week, 1 drinks/episode average.  Alcohol use interferes with work or home: No.  Drinks               more than intended: No.      Tobacco Assessment            Never      Substance Abuse Assessment            Never      Employment and Education Assessment            Unemployed, Work/School description: homemaker.      Home and Environment Assessment            Marital status: .  Lives with Children, Spouse.            Marital status: .  Spouse/Partner name: Amrik Bahena.  Lives with Self, Children, Spouse.  2 children.               Risks in environment: Unlocked guns.      Nutrition and Health Assessment            Type of diet: Regular, Gluten free, dairy free.  Wants to lose weight: Yes.  Sleeping concerns: Yes.  Feels               highly stressed: Yes.      Exercise and Physical Activity Assessment            Exercise frequency: 3-4 times/week.  Exercise type: Cardio.      Sexual Assessment            Sexually active: Yes.  Sexual orientation: Heterosexual.        Physical Examination   Vital Signs   7/12/2018 4:12 PM CDT Peripheral Pulse Rate 74 bpm    Systolic Blood Pressure 112 mmHg    Diastolic Blood Pressure 74 mmHg    Mean Arterial Pressure 87 mmHg      Measurements from flowsheet : Measurements   7/12/2018 4:12 PM CDT Height Measured - Standard 63 in    Weight Measured - Standard 124.6 lb    BSA 1.58 m2    Body Mass Index 22.07 kg/m2         Impression and Plan   Diagnosis     Neck pain (QMC12-SM M54.2).      Migraines (NUK81-ZO G43.909).     Plan:  Patient has probably facet pain in her neck and also has recurrent headaches and very likely related; she has had a prolonged episode now.  We talked about options she has done and could retry.  She plans on seeing a physician in [place] about having medial branch block done.  I assume she would be a candidate for manipulation therapy since she got good relief from previous injections.  I think her headaches might respond well to Botox and I have referred her to Neurology for further evaluation.  Then because of the severity of her headaches this week I have recommended prednisone burst of 40 mg daily for seven days.

## 2022-02-15 NOTE — PROGRESS NOTES
Patient:   LAZARA HERNANDEZ            MRN: [id number]            FIN: [id number]               Age:   36 years     Sex:  Female     :  1980   Associated Diagnoses:   Tension headache   Author:   Samuel Dawn MD      Visit Information      Date of Service: 2017 06:25 pm  Performing Location: [facility]  Encounter#: [id number]      Primary Care Provider (PCP):  Samuel Dawn MD    NPI# [id number]      Referring Provider:  No referring provider recorded for selected visit.      Chief Complaint      History of Present Illness   2015: Lazara presents to clinic with recurrent irritable bowel symptoms.  She had similar incidents approximately 1 year ago and underwent extensive gastroenterology evaluation at that time.  She found intermittent use of loperamide to be most effective.  Now she is having copious diarrhea approximately 6-10 times daily.  Also questions whether any of this could be due to underlying vitamin D deficiency.    2016: Lazara presents to clinic with three-day history of profound fatigue, and no energy to do anything.  No recent sicknesses or illnesses.  Now feels like she just does not have the energy to get through the day.  Frequently will take naps in the car during children s activities.  No described depression.  Her life at baseline stressful though no recent changes.  She did perform a home urine pregnancy test 1 week ago which was normal.  She feels that sleep has not been an issue; typically sleeping on average of 7 hours per night.  Describes diffuse myofascial pains.  Previous bowel issues have resolved with gluten-free trial diet.      2017: Lazara returns to clinic for a reevaluation of her tension type headache. She complains of a band like feeling around her head and extending into her neck and shoulders. She was seen last month by Dr. Lu, and prescribed PT and amitriptyline. She did not  the amitryptyline as she was unsure to how it works and  why it would be beneficial. She did see PT twice a week and has recieved some benefit from it. She also had resolution of her bowel issues due to limiting certain food items.      4/6/2017: Lazara returns to clinic for a reevaluation of her tension type headache. She complains of a band like feeling around her head and extending into her neck and shoulders. She was seen last month by Dr. Lu, and prescribed PT and amitriptyline. She did not  the amitryptyline as she was unsure to how it works and why it would be beneficial. She did see PT twice a week and has recieved some benefit from it. She also had resolution of her bowel issues due to limiting certain food items.       Review of Systems   Constitutional:  Negative.    Eye:  Negative.    Ear/Nose/Mouth/Throat:  Negative.    Respiratory:  Negative.    Cardiovascular:  Negative.    Gastrointestinal:  Negative.    Musculoskeletal:  Neck pain.         Back pain: Bilaterally, In the upper region.    Neurologic:  Alert and oriented X4, Headache.    Psychiatric:  Negative.       Health Status   Allergies:    Allergic Reactions (Selected)  Severity Not Documented  Coumadin (No reactions were documented)   Problem list:    All Problems  Arthralgia / ICD-9-.40 / Confirmed  Resolved: *Hospitalized@[facility] - Arthralgia of multiple joints  Resolved: DVT - Deep vein thrombosis of lower limb / SNOMED CT 8526068443  Resolved: Infertility treatment / SNOMED CT 2650096561  Resolved: PCOD - Polycystic ovarian disease / SNOMED CT 081870871  Resolved: Pregnancy / SNOMED CT 488227202  Resolved: Pregnant / SNOMED CT 454064132  Resolved: Protein S deficiency / SNOMED CT 5003057897      Histories   Past Medical History:    Active  Arthralgia (719.40)  Resolved  *Hospitalized@[facility] - Arthralgia of multiple joints: Onset on 6/21/2010 at 29 years.  Resolved.  Pregnancy (516860283): Onset on 7/29/2006 at 26 years.  Resolved on 4/7/2007 at 26 years.  Protein S deficiency  (1285868462):  Resolved.  DVT - Deep vein thrombosis of lower limb (7922183337):  Resolved.  PCOD - Polycystic ovarian disease (223944970):  Resolved.  Infertility treatment (7940964446):  Resolved.   Family History:    Diabetes mellitus  Mother  Systemic lupus erythematosus  Sister  Rheumatoid arthritis  Sister  Heart disease  Father     Procedure history:    Colonoscopy (45.23) on 1/31/2014 at 33 Years.  Comments:  2/13/2014 2:15 PM - Diana Mahmood.  Esophagogastroduodenoscopy (621030395) on 1/31/2014 at 33 Years.  Laparoscopy (144657947) on 6/22/2012 at 31 Years.  Appendectomy (331061193).  Hernia repair (19916190).  knee surgery x3.   Social History:        Alcohol Assessment            Never, 1-2 times per week, 1 drinks/episode average.      Tobacco Assessment            Never      Substance Abuse Assessment            Never      Employment and Education Assessment            Unemployed      Home and Environment Assessment            Marital status: .            Lives with Self, Children, Spouse.      Nutrition and Health Assessment            Type of diet: Regular.      Exercise and Physical Activity Assessment            Exercise frequency: 3-4 times/week.  Exercise type: Cardio.      Sexual Assessment            Sexually active: Yes.  Sexual orientation: Heterosexual.        Physical Examination   VS/Measurements   General:  Alert and oriented, No acute distress.    Eye:  Pupils are equal, round and reactive to light, Extraocular movements are intact, Normal conjunctiva.    HENT:  Normocephalic.    Neck:  Supple, Non-tender.    Respiratory:  Lungs are clear to auscultation, Respirations are non-labored.    Cardiovascular:  Normal rate, Regular rhythm, No murmur, No gallop.    Neurologic:  Alert, Oriented, Normal sensory, Normal motor function, No focal deficits.    Psychiatric:  Cooperative, Appropriate mood & affect.       Impression and Plan   Diagnosis     Tension headache (ZHV40-ZE  G44.209).     Plan   Orders     Orders (Selected)   Outpatient Orders  Ordered  Physical Therapy Evaluation and Treatment (Request): Chronic daily headache  Prescriptions  Prescribed  amitriptyline 50 mg oral tablet: 1 tab(s) ( 50 mg ), PO, Once a day (at bedtime), # 30 tab(s), 3 Refill(s), Type: Maintenance, Pharmacy: [email or web address], 1 tab(s) po hs.       .) chronic, tension headache   - initially with very favorable response to amitriptyline, reaching dose of 30mg daily - no adverse effects   - will have her titrate up to 50mg qhs   - continue work with PT, posture control   - suspect more myofascial pains/fibromyalgia - doubt radicular source of pains.  I don't think neuroimaging would be helpful at this point

## 2022-02-15 NOTE — LETTER
(Inserted Image. Unable to display)         August 27, 2020        KENYA HERNANDEZ  [address]  [place], [place] [id number]        Dear KEYNA,    Thank you for selecting [facility] for your healthcare needs.    Our records indicate you are due for the following services:     Annual Physical     To schedule an appointment or if you have further questions, please contact your primary clinic:   [facility]       [phone]   [facility]       [phone]              [facility]     [phone]      Powered by [employer]    Sincerely,    CARMENCITA Huntley

## 2022-02-15 NOTE — PROGRESS NOTES
Patient:   LAZARA HERNANDEZ            MRN: [id number]            FIN: [id number]               Age:   38 years     Sex:  Female     :  1980   Associated Diagnoses:   Tension headache; Chronic myofascial pain; Irritable bowel syndrome (IBS)   Author:   Samuel Dawn MD      Visit Information      Date of Service: 2019 05:13 pm  Performing Location: [facility]  Encounter#: [id number]      Primary Care Provider (PCP):  Samuel Dawn MD    NPI# [id number]      Referring Provider:  Samuel Dawn MD    NPI# [id number]      Chief Complaint      History of Present Illness   2015: Lazara presents to clinic with recurrent irritable bowel symptoms.  She had similar incidents approximately 1 year ago and underwent extensive gastroenterology evaluation at that time.  She found intermittent use of loperamide to be most effective.  Now she is having copious diarrhea approximately 6-10 times daily.  Also questions whether any of this could be due to underlying vitamin D deficiency.      2019:  Lazara presents with worsening irritable bowel-like symptoms for the past several months.  She has had a long history of similar related symptoms which wax and wane.  Originally underwent diagnostic evaluation through gastroenterology back in .  Workup included extensive serological testing and endoscopy which had an abnormal.  Ultimately symptoms did improve with use of as needed Imodium.  She has been generally very observant about dietary stressors.  She feels like there is a significant underlying emotional stressor component as well.  Long history of tension-like headaches.  Currently using Imodium multiple times per day.  Describes anywhere from 5-10 loose stools per day.  Weights are stable.  No bloody component the diarrhea.  Generally has cramping abdominal pain         Review of Systems   Constitutional:  Negative.    Eye:  Negative.    Ear/Nose/Mouth/Throat:  Negative.    Respiratory:  Negative.     Cardiovascular:  Negative.    Gastrointestinal:  Diarrhea.         Abdominal pain: All quadrants.    Musculoskeletal:  Neck pain.         Back pain: Bilaterally, In the upper region.    Neurologic:  Alert and oriented X4, Headache.    Psychiatric:  Anxiety.       Health Status   Allergies:    Allergic Reactions (Selected)  Severity Not Documented  Coumadin (No reactions were documented)  Cymbalta (Groggy)   Problem list:    All Problems  Anxiety disorder / SNOMED CT 3PQ32Z82-370M-82B1-PB45-FJP76546E119 / Confirmed  Chronic fatigue disorder / SNOMED CT 88A69265-6ZU9-6170-IG55-J1NG59L10E98 / Confirmed  Irritable bowel syndrome (IBS) / SNOMED CT 4IHLK012-01Y0-067R-BE2S-AH147ILKKN6T / Confirmed  Arthralgia / SNOMED CT 53165606 / Confirmed  Stress incontinence in female / SNOMED CT 98137399-RMW2-231T-O1WG-5850035796C8 / Confirmed  Tension headache / SNOMED CT 6534678724 / Confirmed  Resolved: *Hospitalized@[facility] - Arthralgia of multiple joints  Resolved: DVT - Deep vein thrombosis of lower limb / SNOMED CT 8047671302  Resolved: Infertility treatment / SNOMED CT 6463114430  Resolved: PCOD - Polycystic ovarian disease / SNOMED CT 308984323  Resolved: Pregnancy / SNOMED CT 866816124  Resolved: Pregnant / SNOMED CT 822768202  Resolved: Protein S deficiency / SNOMED CT 7357325374      Histories   Past Medical History:    Active  Stress incontinence in female (24848204-GXH7-129S-K8UJ-5861913992H1): Onset in 2013 at 33 years.  Arthralgia (09219361)  Anxiety disorder (8QC40Q80-869M-97X4-KC47-ZYX42287Q222)  Irritable bowel syndrome (IBS) (3KNNL809-54K3-067Y-GW9C-CF445LZUWL6A)  Chronic fatigue disorder (63K35872-2KH1-7520-JZ75-K9NC67K77N22)  Tension headache (0075381411)  Resolved  *Hospitalized@[facility] - Arthralgia of multiple joints: Onset on 6/21/2010 at 29 years.  Resolved.  Pregnancy (796262189): Onset on 7/29/2006 at 26 years.  Resolved on 4/7/2007 at 26 years.  Protein S deficiency (3407033920):  Resolved.  DVT - Deep vein  thrombosis of lower limb (3751910734):  Resolved.  PCOD - Polycystic ovarian disease (535540212):  Resolved.  Infertility treatment (7693542389):  Resolved.   Family History:    Hearing aid  Daughter  Son  Diabetes mellitus  Mother  Systemic lupus erythematosus  Sister  Rheumatoid arthritis  Sister  Heart disease  Father  Thyroid disease  Mother     Procedure history:    Colonoscopy (45.23) on 1/31/2014 at 33 Years.  Comments:  2/13/2014 2:15 PM CST - TimoteoDiana meléndez  Normal.  Esophagogastroduodenoscopy (909764050) on 1/31/2014 at 33 Years.  Laparoscopy (261247021) on 6/22/2012 at 31 Years.  Appendectomy (429803321).  Hernia repair (73041493).  knee surgery x3.  Intrauterine insemination (515203890).   Social History:        Alcohol Assessment            1 drink about once per week, 1 drinks/episode average.  Alcohol use interferes with work or home: No.  Drinks               more than intended: No.      Tobacco Assessment            Never      Substance Abuse Assessment            Never      Employment and Education Assessment            Unemployed, Work/School description: homemaker.      Home and Environment Assessment            Marital status: .  Lives with Children, Spouse.            Marital status: .  Spouse/Partner name: Amrik Bahena.  Lives with Self, Children, Spouse.  2 children.               Risks in environment: Unlocked guns.      Nutrition and Health Assessment            Type of diet: Regular, Gluten free, dairy free.  Wants to lose weight: Yes.  Sleeping concerns: Yes.  Feels               highly stressed: Yes.      Exercise and Physical Activity Assessment            Exercise frequency: 3-4 times/week.  Exercise type: Cardio.      Sexual Assessment            Sexually active: Yes.  Sexual orientation: Heterosexual.      Physical Examination   VS/Measurements   General:  Alert and oriented, No acute distress.    Eye:  Pupils are equal, round and reactive to light, Extraocular movements  are intact.    HENT:  Normocephalic.    Neck:  Non-tender.    Respiratory:  Lungs are clear to auscultation, Respirations are non-labored, Breath sounds are equal.    Cardiovascular:  Normal rate, Regular rhythm.    Gastrointestinal:  Soft, Non-tender, Non-distended, Normal bowel sounds.    Neurologic:  Alert, Oriented, Normal sensory, Normal motor function.    Psychiatric:  Cooperative, Appropriate mood & affect.       Impression and Plan   Diagnosis     Tension headache (NNT27-NO G44.209).     Chronic myofascial pain (EWT27-IT M79.18).     Irritable bowel syndrome (IBS) (SIM00-DW K58.9).     Plan   Orders       .) chronic, tension headache   - initially with very favorable response to amitriptyline 25mg daily, since no observed effect   - working with neurology; undergoing botox injections    .) IBS - D; persistent problem   - GI evaluation in '13; endoscopy normal.  Serological work-up for celiac, IBD, stool studies all normal   - historically with partial success with food type avoidance and anti-diarrheal agents   - will repeat C. difficile assay and stool culture to rule out infection   - Rx for Lomotil provided; recommending trial of simethicone   - information on FODMAPS diet provided   - discussed use of TCA antidepressants, will start on imipramine 10mg qhs for 3 weeks, then increase to 20mg qhs (generally sensitive to any sedating agents)   - doubt pancreatic exocrine insufficiency given stable weights, no other malnutrition signs - could consider trial of pancreolipase in the future if no success with both therapies   - consider future options with ondansetron         Professional Services   Counseling Summary:  This was a 25 minute visit with greater than 50% of that time spent counseling the patient.

## 2022-02-15 NOTE — PROGRESS NOTES
Patient:   KENYA HERNANDEZ            MRN: [id number]            FIN: [id number]               Age:   39 years     Sex:  Female     :  1980   Associated Diagnoses:   Abnormal vaginal bleeding; Deficiency, protein S   Author:   Merrill Friedman MD      Preoperative Information   Indication for surgery:  Vaginal bleeding.    Accompanied by:  No one.    Source of history:  Self.           Chief Complaint   9/10/2019 6:00 PM CDT    Preop.  Dr. Pederson.  [facility].  19.  Laparoscopy, Hysteroscopy, D&C and possible ablation  (Modified)      Review of Systems   Constitutional:  Negative.    Eye:  Negative.    Ear/Nose/Mouth/Throat:  Negative.    Respiratory:  Negative.    Cardiovascular:  Negative.    Gastrointestinal:  No nausea, No vomiting.    Genitourinary:  No dysuria, No hematuria.    Hematology/Lymphatics:  Negative.    Endocrine:  Negative.    Immunologic:  Negative.    Musculoskeletal:  Negative.    Integumentary:  Negative.    Neurologic:  Negative.       Health Status   Allergies:    Allergic Reactions (Selected)  Severity Not Documented  Coumadin (No reactions were documented)  Cymbalta (Groggy)   Medications:  (Selected)   Prescriptions  Prescribed  Lomotil 2.5 mg-0.025 mg oral tablet: 1 tab(s), po, q6 hrs, PRN: as needed for loose stool, # 100 tab(s), 1 Refill(s), Type: Maintenance, Pharmacy: [id number], 1 tab(s) Oral q6 hrs,PRN:as needed for loose stool  Documented Medications  Documented  Imodium A-D: ( 2 mg ), Oral, q4 hrs, 0 Refill(s), Type: Maintenance  Maxalt: Oral, daily, PRN: as needed for migraine headache, 0 Refill(s), Type: Maintenance  Tums: ( 500 mg ), Chewed, daily, 0 Refill(s), Type: Maintenance,    Medications          *denotes recorded medication          Lomotil 2.5 mg-0.025 mg oral tablet: 1 tab(s), po, q6 hrs, PRN: as needed for loose stool, 100 tab(s), 1 Refill(s).          *Tums: 500 mg, Chewed, daily, 0 Refill(s).          *Imodium A-D: 2 mg,  Oral, q4 hrs, 0 Refill(s).          *Maxalt: Oral, daily, PRN: as needed for migraine headache, 0 Refill(s).       Problem list:    All Problems  Anxiety disorder / SNOMED CT 9HW24E55-659I-27C0-HE38-HZK59730Z034 / Confirmed  Chronic fatigue disorder / SNOMED CT 61H61610-6FR9-3106-GT42-G3QC31B35W28 / Confirmed  H/O migraine / SNOMED CT 419383367 / Confirmed  Irritable bowel syndrome (IBS) / SNOMED CT 3EMND357-98M1-092V-FR4U-SO685NIDSG5L / Confirmed  Arthralgia / SNOMED CT 60213255 / Confirmed  Deficiency, protein S / SNOMED CT 2511438 / Confirmed  Stress incontinence in female / SNOMED CT 60767099-YHL5-248X-W0EV-3809203023T5 / Confirmed  Tension headache / SNOMED CT 0804515218 / Confirmed  Resolved: *Hospitalized@[facility] - Arthralgia of multiple joints  Resolved: DVT - Deep vein thrombosis of lower limb / SNOMED CT 3030782413  Resolved: Infertility treatment / SNOMED CT 2664854174  Resolved: PCOD - Polycystic ovarian disease / SNOMED CT 622642407  Resolved: Pregnancy / SNOMED CT 262282947  Resolved: Pregnant / SNOMED CT 930559107  Resolved: Protein S deficiency / SNOMED CT 6010509502      Histories   Past Medical History:    Active  Stress incontinence in female (45671443-BMP0-067B-Q9RM-0592546646L9): Onset in 2013 at 33 years.  Arthralgia (77008968)  Anxiety disorder (2HD06J02-351B-75V5-NF36-JDV69612W333)  Irritable bowel syndrome (IBS) (3GIAV851-25N6-860F-BW6H-EE327HUBNA1K)  Chronic fatigue disorder (31N75586-6IW7-3413-CY98-Y4WY47O03F65)  Tension headache (0325030732)  Resolved  *Hospitalized@[facility] - Arthralgia of multiple joints: Onset on 6/21/2010 at 29 years.  Resolved.  Pregnancy (463951117): Onset on 7/29/2006 at 26 years.  Resolved on 4/7/2007 at 26 years.  Protein S deficiency (4975699700):  Resolved.  DVT - Deep vein thrombosis of lower limb (1448555290):  Resolved.  PCOD - Polycystic ovarian disease (544671607):  Resolved.  Infertility treatment (8945558552):  Resolved.   Family History:    Hearing  aid  Daughter  Son  Diabetes mellitus  Mother  Systemic lupus erythematosus  Sister  Rheumatoid arthritis  Sister  Heart disease  Father  Thyroid disease  Mother     Procedure history:    Colonoscopy (ICD-9-CM 45.23) performed by Wayne Butts MD on 1/31/2014 at 33 Years.  Comments:  2/13/2014 2:15 PM CST - Diana Mahmood  Normal.  Esophagogastroduodenoscopy (SNOMED CT 408296249) performed by Wayne Butts MD on 1/31/2014 at 33 Years.  Laparoscopy (SNOMED CT 396804727) performed by Trey Pederson MD on 6/22/2012 at 31 Years.  Appendectomy (SNOMED CT 885599553).  Hernia repair (SNOMED CT 54403009).  knee surgery x3.  Intrauterine insemination (SNOMED CT 329031426).   Social History:        Alcohol Assessment            1 drink about once per week, 1 drinks/episode average.  Alcohol use interferes with work or home: No.  Drinks               more than intended: No.      Tobacco Assessment            Never      Substance Abuse Assessment            Never      Employment and Education Assessment            Unemployed, Work/School description: homemaker.      Home and Environment Assessment            Marital status: .  Lives with Children, Spouse.            Marital status: .  Spouse/Partner name: Amrik Bahena.  Lives with Self, Children, Spouse.  2 children.               Risks in environment: Unlocked guns.      Nutrition and Health Assessment            Type of diet: Regular, Gluten free, dairy free.  Wants to lose weight: Yes.  Sleeping concerns: Yes.  Feels               highly stressed: Yes.      Exercise and Physical Activity Assessment            Exercise frequency: 3-4 times/week.  Exercise type: Cardio.      Sexual Assessment            Sexually active: Yes.  Sexual orientation: Heterosexual.       Has  no history of anemia.  Has a  history of DVT right leg several years ago  Has  no personal history of bleeding problems.   Has no personal or family history of anesthesia reactions.  Patient does  not have active tuberculosis.    S/he  has not taken aspirin or aspirin containing products in the last week.       Chest pain / SOB walking up 2 flights of steps? no  Asthma  or Bronchitis? no  Diabetes? no       Insulin/Orals?   Seizure Disorder? no  CKD?no  Thyroid Disease?no  Liver Disease no           Physical Examination   Vital Signs   9/10/2019 5:37 PM CDT Temperature Tympanic 98.0 DegF    Peripheral Pulse Rate 79 bpm    HR Method Electronic    Systolic Blood Pressure 95 mmHg    Diastolic Blood Pressure 63 mmHg    Mean Arterial Pressure 74 mmHg    BP Method Electronic      Measurements from flowsheet : Measurements   9/10/2019 5:37 PM CDT Height Measured - Standard 63.5 in    Weight Measured - Standard 115.6 lb    BSA 1.53 m2    Body Mass Index 20.15 kg/m2      General:  Alert and oriented, No acute distress.    Eye:  Pupils are equal, round and reactive to light, Extraocular movements are intact.    HENT:  Normocephalic, Oral mucosa is moist.       Neck:  Supple, Non-tender, No lymphadenopathy, No thyromegaly.       Respiratory:  Lungs are clear to auscultation, Respirations are non-labored, Breath sounds are equal.    Cardiovascular:  Normal rate, Regular rhythm, No murmur, Good pulses equal in all extremities, No edema.    Gastrointestinal:  Soft, Non-tender, Non-distended, Normal bowel sounds, No organomegaly.    Musculoskeletal:  No tenderness, No swelling, No deformity.       Integumentary:  Warm, Dry.    Neurologic:  Alert, Oriented, No focal deficits, Cranial Nerves II-XII are grossly intact.       Psychiatric:  Cooperative, Appropriate mood & affect, Normal judgment.       Review / Management            Impression and Plan   Diagnosis     Abnormal vaginal bleeding (GGK49-CB N93.9).     Deficiency, protein S (TIK44-RE D68.59).     Condition:  ok for surgery asa2.

## 2022-02-15 NOTE — NURSING NOTE
Comprehensive Intake Entered On:  8/14/2019 4:50 PM CDT    Performed On:  8/14/2019 4:42 PM CDT by Thalia Gomez CMA               Summary   Chief Complaint :   Annual exam. Discuss irregular menses. Hx of ocp in college - none since. Night sweats/hot flashes.    Last Menstrual Period :   7/13/2019 CDT   Menstrual Status :   Menarcheal   Weight Measured :   116 lb(Converted to: 116 lb 0 oz, 52.62 kg)    Height Measured :   63.5 in(Converted to: 5 ft 3 in, 161.29 cm)    Body Mass Index :   20.22 kg/m2   Body Surface Area :   1.53 m2   Systolic Blood Pressure :   108 mmHg   Diastolic Blood Pressure :   72 mmHg   Mean Arterial Pressure :   84 mmHg   Peripheral Pulse Rate :   76 bpm   BP Site :   Right arm   Pulse Site :   Radial artery   BP Method :   Manual   HR Method :   Manual   Temperature Tympanic :   98.4 DegF(Converted to: 36.9 DegC)    Race :      Languages :   English   Ethnicity :   Not  or    Thalia Gomez CMA - 8/14/2019 4:42 PM CDT   Health Status   Allergies Verified? :   Yes   Medication History Verified? :   Yes   Pre-Visit Planning Status :   Completed   Tobacco Use? :   Never smoker   Thalia Gomez CMA - 8/14/2019 4:42 PM CDT   Meds / Allergies   (As Of: 8/14/2019 4:50:22 PM CDT)   Allergies (Active)   Coumadin  Estimated Onset Date:   Unspecified ; Created By:   Rosario Dela Cruz; Reaction Status:   Active ; Category:   Drug ; Substance:   Coumadin ; Type:   Allergy ; Updated By:   Rosario Dela Cruz; Source:   Paper Chart ; Reviewed Date:   7/3/2019 8:32 AM CDT      Cymbalta  Estimated Onset Date:   Unspecified ; Reactions:   groggy ; Created By:   Diana Ayala CMA; Reaction Status:   Active ; Category:   Drug ; Substance:   Cymbalta ; Type:   Allergy ; Updated By:   Diana Ayala CMA; Reviewed Date:   7/3/2019 8:32 AM CDT        Medication List   (As Of: 8/14/2019 4:50:22 PM CDT)   Prescription/Discharge Order    atropine-diphenoxylate  :   atropine-diphenoxylate ;  Status:   Prescribed ; Ordered As Mnemonic:   Lomotil 2.5 mg-0.025 mg oral tablet ; Simple Display Line:   1 tab(s), po, q6 hrs, PRN: as needed for loose stool, 100 tab(s), 1 Refill(s) ; Ordering Provider:   Samuel Dawn MD; Catalog Code:   atropine-diphenoxylate ; Order Dt/Tm:   1/17/2019 6:30:46 PM          imipramine  :   imipramine ; Status:   Completed ; Ordered As Mnemonic:   imipramine 10 mg oral tablet ; Simple Display Line:   See Instructions, 1 tab(s) Oral qhs for 3 weeks, then increase to 2 tabs qhs, 60 tab(s), 5 Refill(s) ; Ordering Provider:   Samuel Dawn MD; Catalog Code:   imipramine ; Order Dt/Tm:   1/17/2019 9:11:29 PM            Home Meds    calcium carbonate  :   calcium carbonate ; Status:   Documented ; Ordered As Mnemonic:   Tums ; Simple Display Line:   500 mg, Chewed, daily, 0 Refill(s) ; Catalog Code:   calcium carbonate ; Order Dt/Tm:   1/17/2019 5:49:19 PM          loperamide  :   loperamide ; Status:   Documented ; Ordered As Mnemonic:   Imodium A-D ; Simple Display Line:   2 mg, Oral, q4 hrs, 0 Refill(s) ; Catalog Code:   loperamide ; Order Dt/Tm:   1/17/2019 5:49:15 PM          rizatriptan  :   rizatriptan ; Status:   Documented ; Ordered As Mnemonic:   Maxalt ; Simple Display Line:   Oral, daily, PRN: as needed for migraine headache, 0 Refill(s) ; Catalog Code:   rizatriptan ; Order Dt/Tm:   8/14/2019 4:48:34 PM

## 2022-02-15 NOTE — TELEPHONE ENCOUNTER
Entered by Thalia Gomez CMA on August 15, 2019 9:54:26 AM CDT  Per Anthony rodríguez/[facility]  for  no auth/req. covered 100%        ---------------------  From: Thalia Gomez CMA   To: [employer] Message Pool ([id number]_[facility]);     Sent: 8/14/2019 6:05:59 PM CDT  Subject: Mirena      Sent message on 8/14 to ALAN to see if Mirena covered in case pt wants to try it.

## 2022-02-15 NOTE — PROGRESS NOTES
Patient:   LAZARA HERNANDEZ            MRN: [id number]            FIN: [id number]               Age:   40 years     Sex:  Female     :  1980   Associated Diagnoses:   Tension headache; Chronic myofascial pain; Irritable bowel syndrome (IBS)   Author:   Samuel Dawn MD      Visit Information      Date of Service: 2021 06:54 am  Performing Location: [facility]  Encounter#: [id number]      Primary Care Provider (PCP):  Samuel Dawn MD    NPI# [id number]      Referring Provider:  Samuel Dawn MD    NPI# [id number]      Chief Complaint      History of Present Illness         2021:  Lazara returns to clinic for complaints of chronic diarrhea.  She is seen me on multiple occasions for same spectrum of symptoms.  She describes persistent watery diarrhea typically more morning predominant frequently with 4-6 times per day.  Generally does better through the day in the evenings.  Never with nocturnal symptoms that awaken her.  Never with any bloody component.  Some associate abdominal cramping.  Does have generalized abdominal discomfort and bloating during these episodes.  Never has a significant constipated spectrum of symptoms.  Historically has used loperamide with some degree of success.  Has been working with neurologist related to history of chronic migraines.  Now maintained on valproate with generally good success, having breakthrough migraine approximately once per month.  Has not required any abortive therapies for quite some time.  Previously had been on imipramine and nortriptyline as prior headaches Bactrim and had not noticed any significant impact on her GI complaints.  Did have GI referral back in  including endoscopy which was normal.  Also has seen rheumatology related to transient joint swelling and finger swelling without any clear etiology identified.         Review of Systems   Constitutional:  Negative.    Eye:  Negative.    Ear/Nose/Mouth/Throat:  Negative.    Respiratory:   Negative.    Cardiovascular:  Negative.    Gastrointestinal:  Diarrhea.         Abdominal pain: All quadrants.    Musculoskeletal:  No back pain, No neck pain.    Neurologic:  Alert and oriented X4, Headache.    Psychiatric:  No anxiety.       Health Status   Allergies:    Allergic Reactions (Selected)  Severity Not Documented  Coumadin (No reactions were documented)  Cymbalta (Groggy)   Problem list:    All Problems  Anxiety disorder / SNOMED CT 1AQ20M88-919Y-79J9-WM44-NBS49572Y478 / Confirmed  Chronic fatigue disorder / SNOMED CT 73O79305-3ZB5-0248-ED49-W2UT66V63M60 / Confirmed  H/O migraine / SNOMED CT 371330610 / Confirmed  Irritable bowel syndrome (IBS) / SNOMED CT 9NNCR600-15D0-166Q-PM1J-IH289IJFNM8W / Confirmed  Arthralgia / SNOMED CT 95273242 / Confirmed  Deficiency, protein S / SNOMED CT 0100937 / Confirmed  Stress incontinence in female / SNOMED CT 96262322-QYP5-539Z-V1GB-2484443479Q6 / Confirmed  Tension headache / SNOMED CT 0215460721 / Confirmed  Resolved: *Hospitalized@[facility] - Arthralgia of multiple joints  Resolved: DVT - Deep vein thrombosis of lower limb / SNOMED CT 6129168770  Resolved: Infertility treatment / SNOMED CT 1477635123  Resolved: PCOD - Polycystic ovarian disease / SNOMED CT 016809636  Resolved: Pregnancy / SNOMED CT 108580952  Resolved: Pregnant / SNOMED CT 305645013  Resolved: Protein S deficiency / SNOMED CT 7669233489      Histories   Past Medical History:    Active  Stress incontinence in female (27394019-KIO0-279K-Z6CG-2823608092R0): Onset in 2013 at 33 years.  Arthralgia (82761349)  Anxiety disorder (8ZS35S98-550Z-72O6-HL35-NGR07346F896)  Irritable bowel syndrome (IBS) (4AGSC120-44Q5-049T-RB3B-HI361QBHYG1K)  Chronic fatigue disorder (98N40352-9VJ1-9220-AU75-L3CU33L20W98)  Tension headache (9720766874)  Deficiency, protein S (6576677)  Resolved  *Hospitalized@[facility] - Arthralgia of multiple joints: Onset on 6/21/2010 at 29 years.  Resolved.  Pregnancy (229103734): Onset on 7/29/2006  at 26 years.  Resolved on 4/7/2007 at 26 years.  Protein S deficiency (5589132144):  Resolved.  DVT - Deep vein thrombosis of lower limb (5152523091):  Resolved.  PCOD - Polycystic ovarian disease (318118932):  Resolved.  Infertility treatment (6751795983):  Resolved.   Family History:    Hearing aid  Daughter  Son  Diabetes mellitus  Mother  Systemic lupus erythematosus  Sister  Rheumatoid arthritis  Sister  Heart disease  Father  Thyroid disease  Mother     Procedure history:    Colonoscopy (45.23) on 1/31/2014 at 33 Years.  Comments:  2/13/2014 2:15 PM CST - Diana Mahmood  Normal.  Esophagogastroduodenoscopy (900160684) on 1/31/2014 at 33 Years.  Laparoscopy (590686078) on 6/22/2012 at 31 Years.  Appendectomy (594365391).  Hernia repair (32204949).  knee surgery x3.  Intrauterine insemination (147074408).   Social History:        Electronic Cigarette/Vaping Assessment            Electronic Cigarette Use: Never.      Alcohol Assessment: Current            1 drink about once per week, Wine (5 oz), 1-2 times per month, 1 drinks/episode average.  Alcohol use               interferes with work or home: No.  Drinks more than intended: No.  Ready to change: No.      Tobacco Assessment            Never (less than 100 in lifetime)      Substance Abuse Assessment: Denies Substance Abuse            Never      Employment and Education Assessment            Work/School description: .  Highest education level: College.      Home and Environment Assessment            Marital status: .  Lives with Children, Spouse.  Living situation: Home/Independent.               Injuries/Abuse/Neglect in household: No.  Feels unsafe at home: No.  Family/Friends available for support:               Yes.            Marital status: .  Spouse/Partner name: Amrik Bahena.  Lives with Self, Children, Spouse.  2 children.               Risks in environment: Unlocked guns.      Nutrition and Health Assessment             Type of diet: Regular, Gluten free, dairy free.  Wants to lose weight: Yes.  Sleeping concerns: Yes.  Feels               highly stressed: Yes.      Exercise and Physical Activity Assessment            Exercise frequency: 3-4 times/week.  Exercise type: Cardio.            Exercise frequency: 1-2 times/week.      Sexual Assessment            Sexually active: Yes.  Sexual orientation: Heterosexual.            Identifies as female, History of STD: No.  Uses condoms: Yes.  History of sexual abuse: No.      Other Assessment            Irregular menses.  Painful periods.        Physical Examination   VS/Measurements   General:  Alert and oriented, No acute distress.    Eye:  Pupils are equal, round and reactive to light, Extraocular movements are intact, Normal conjunctiva.    HENT:  Normocephalic, Tympanic membranes are clear, Oral mucosa is moist, No pharyngeal erythema.    Neck:  Non-tender.    Respiratory:  Lungs are clear to auscultation, Respirations are non-labored, Breath sounds are equal.    Cardiovascular:  Normal rate, Regular rhythm.    Gastrointestinal:  Soft, Non-tender, Non-distended, Normal bowel sounds.    Genitourinary:  No costovertebral angle tenderness.    Neurologic:  Alert, Oriented, Normal sensory, Normal motor function.    Psychiatric:  Cooperative, Appropriate mood & affect.       Impression and Plan   Diagnosis     Tension headache (ECW11-IN G44.209).     Chronic myofascial pain (LEQ98-LO M79.18).     Irritable bowel syndrome (IBS) (QCR61-XW K58.9).     Plan   Orders       .) chronic, tension/migraine headache   - working with neurology   - currently maintained on valproate with good results   - Maxalt for abortive therapy though hasn't needed    .) IBS - D; chronic problem   - GI evaluation in '13; endoscopy normal.  Serological work-up for celiac, IBD, stool studies all normal   - historically with partial success with food type avoidance and anti-diarrheal agents   - following FODMAPS diet     - previous use of TCA antidepressants (both imipramine or nortriptyline - didn't find either effective)   - will trial cholestyramine; initially 4g daily and advised to increase to goal dose of 4g TID   - checking CBC w/ diff, BMP, TSH, CRP, sed rate - historically her laboratory work-up has been normal   - referral back to GI    - future candidacy for alosetron?   - doubt pancreatic exocrine insufficiency given stable weights, no other malnutrition signs - could consider trial of pancreolipase in the future if no success with both therapies   - consider future options with ondansetron

## 2022-02-15 NOTE — TELEPHONE ENCOUNTER
---------------------  From: Barbi Leung (Patient Demographic Pool ([id number]_[facility]))   To: KENYA HERNANDEZ    Sent: 3/8/2021 10:22:20 AM CST  Subject: RE: Patient information update     That information has been updated.      ---------------------  From: KENYA HERNANDEZ  To: [facility]  Sent: 03/04/2021 08:22 a.m. CST  Subject: Patient information update  KENYA HERNANDEZ requests the following corrections:  Personal contacts Old Value New Value   Emergency Contact   Name [facility], CONTACT  rafael   Home phone  [phone]

## 2022-02-15 NOTE — PROCEDURES
Accession Number:       [id number]  CLINICAL INFORMATION::     None given  LMP::     7/13/19  PREV. PAP::     2015  PREV. BX::     NONE GIVEN  SOURCE::     Cervix  STATEMENT OF ADEQUACY::     Satisfactory for evaluation. Endocervical/transformation zone component absent.  INTERPRETATION/RESULT::     Negative for intraepithelial lesion or malignancy.  COMMENT::     This Pap test has been evaluated with computer assisted technology.  CYTOTECHNOLOGIST::     DORA MILES(ASCP) CT Screening location: [place] [id number]  COMMENT:     See comment       EXPLANATORY NOTE:         The Pap is a screening test for cervical cancer. It is       not a diagnostic test and is subject to false negative       and false positive results. It is most reliable when a       satisfactory sample, regularly obtained, is submitted       with relevant clinical findings and history, and when       the Pap result is evaluated along with historic and       current clinical information.

## 2022-02-15 NOTE — NURSING NOTE
Comprehensive Intake Entered On:  1/17/2019 5:51 PM CST    Performed On:  1/17/2019 5:50 PM CST by Jamie ROSADO Diana               Summary   Chief Complaint :   f/u IBS - continues with chronic diarrhea/cramping, worse in the am    Menstrual Status :   Menarcheal   Weight Measured :   126.8 lb(Converted to: 126 lb 13 oz, 57.52 kg)    Systolic Blood Pressure :   92 mmHg   Diastolic Blood Pressure :   60 mmHg   Mean Arterial Pressure :   71 mmHg   Peripheral Pulse Rate :   64 bpm   BP Site :   Right arm   Temperature Tympanic :   98.2 DegF(Converted to: 36.8 DegC)    Race :      Languages :   English   Ethnicity :   Not  or    Perrilizette ROSADODiana - 1/17/2019 5:50 PM CST   Health Status   Allergies Verified? :   Yes   Medication History Verified? :   Yes   Medical History Verified? :   Yes   Pre-Visit Planning Status :   Completed   Tobacco Use? :   Never smoker   Skylerlizette ROSADO Diana - 1/17/2019 5:50 PM CST   Social History   Social History   (As Of: 1/17/2019 5:51:59 PM CST)   Alcohol:        1 drink about once per week, 1 drinks/episode average.  Alcohol use interferes with work or home: No.  Drinks more than intended: No.   (Last Updated: 9/13/2017 7:37:22 PM CDT by Raquel Santos)          Tobacco:        Never   (Last Updated: 5/29/2012 3:19:18 PM CDT by Avani Nation)          Substance Abuse:        Never   (Last Updated: 5/29/2012 3:19:24 PM CDT by Avani Nation)          Employment and Education:        Unemployed, Work/School description: homemaker.   (Last Updated: 9/13/2017 7:37:56 PM CDT by Raquel Santos)          Home and Environment:        Marital status: .  Spouse/Partner name: Amrik Bahena.  Lives with Self, Children, Spouse.  2 children.  Risks in environment: Unlocked guns.   (Last Updated: 9/13/2017 7:38:27 PM CDT by Raquel Santos)   Marital status: .  Lives with Children, Spouse.   Comments:  9/13/2017 7:39 PM - Long BLACK  Raquel SAMPSON:  works for [employer]. 8/25/2010 7:23 PM - Raeann AGRAWAL, Ned:  works for [employer]   (Last Updated: 9/13/2017 7:39:13 PM CDT by Raquel Santos)          Nutrition and Health:        Type of diet: Regular, Gluten free, dairy free.  Wants to lose weight: Yes.  Sleeping concerns: Yes.  Feels highly stressed: Yes.   (Last Updated: 9/13/2017 7:42:10 PM CDT by Raquel Santos)          Exercise and Physical Activity:        Exercise frequency: 3-4 times/week.  Exercise type: Cardio.   (Last Updated: 5/29/2012 3:20:01 PM CDT by Avani Nation)          Sexual:        Sexually active: Yes.  Sexual orientation: Heterosexual.   (Last Updated: 8/5/2015 2:50:20 PM CDT by Jaqui Beckwith MA)

## 2022-02-15 NOTE — NURSING NOTE
"Comprehensive Intake Entered On:  7/21/2021 7:05 AM CDT    Performed On:  7/21/2021 7:04 AM CDT by Diana Ayala CMA               Summary   Chief Complaint :   IBS - ongoing issues - can't find the right \"mix  frustrated    Menstrual Status :   Menarcheal   Weight Measured :   133.3 lb(Converted to: 133 lb 5 oz, 60.464 kg)    Systolic Blood Pressure :   105 mmHg   Diastolic Blood Pressure :   71 mmHg   Mean Arterial Pressure :   82 mmHg   Peripheral Pulse Rate :   71 bpm   Temperature Tympanic :   97.5 DegF(Converted to: 36.4 DegC)  (LOW)    Race :      Languages :   English   Ethnicity :   Not  or    Diana Ayala CMA - 7/21/2021 7:04 AM CDT   Health Status   Allergies Verified? :   Yes   Medication History Verified? :   Yes   Medical History Verified? :   Yes   Pre-Visit Planning Status :   Completed   Tobacco Use? :   Never smoker   Diana Ayala CMA - 7/21/2021 7:04 AM CDT   Social History   Social History   (As Of: 7/21/2021 7:05:41 AM CDT)   Alcohol:  Current      1 drink about once per week, Wine (5 oz), 1-2 times per month, 1 drinks/episode average.  Alcohol use interferes with work or home: No.  Drinks more than intended: No.  Ready to change: No.   (Last Updated: 9/12/2019 1:09:06 PM CDT by Diana Mahmood)          Tobacco:        Never (less than 100 in lifetime)   (Last Updated: 3/4/2021 9:42:12 AM CST by Libra Mohr LPN)          Electronic Cigarette/Vaping:        Electronic Cigarette Use: Never.   (Last Updated: 3/4/2021 9:42:18 AM CST by Libra Mohr LPN)          Substance Abuse:  Denies Substance Abuse      Never   (Last Updated: 5/29/2012 3:19:24 PM CDT by Avani Nation)          Employment/School:        Work/School description: .  Highest education level: College.   (Last Updated: 9/12/2019 1:10:29 PM CDT by Diana Mahmood)          Home/Environment:        Marital status: .  Spouse/Partner name: Amrik Bahena.  Lives with Self, " Children, Spouse.  2 children.  Risks in environment: Unlocked guns.   (Last Updated: 9/13/2017 7:38:27 PM CDT by Raquel Santos)   Marital status: .  Lives with Children, Spouse.  Living situation: Home/Independent.  Injuries/Abuse/Neglect in household: No.  Feels unsafe at home: No.  Family/Friends available for support: Yes.   Comments:  9/13/2017 7:39 PM - Raquel Santos:  works for [employer]. 8/25/2010 7:23 PM - Ned Cary MD:  works for [employer]   (Last Updated: 9/12/2019 1:09:58 PM CDT by Diana Mahmood)          Nutrition/Health:        Type of diet: Regular, Gluten free, dairy free.  Wants to lose weight: Yes.  Sleeping concerns: Yes.  Feels highly stressed: Yes.   (Last Updated: 9/13/2017 7:42:10 PM CDT by Raquel Santos)          Exercise:        Exercise frequency: 3-4 times/week.  Exercise type: Cardio.   (Last Updated: 5/29/2012 3:20:01 PM CDT by Avani Nation)   Exercise frequency: 1-2 times/week.   (Last Updated: 9/12/2019 1:14:07 PM CDT by Diana Mahmood)          Sexual:        Sexually active: Yes.  Sexual orientation: Heterosexual.   (Last Updated: 8/5/2015 2:50:20 PM CDT by Jaqui Beckwith MA)   Identifies as female, History of STD: No.  Uses condoms: Yes.  History of sexual abuse: No.   (Last Updated: 9/12/2019 1:13:06 PM CDT by Diana Mahmood)          Other:        Irregular menses.  Painful periods.   (Last Updated: 9/12/2019 1:17:15 PM CDT by Diana Mahmood)

## 2022-02-15 NOTE — PROGRESS NOTES
Patient:   KENYA HERNANDEZ            MRN: [id number]            FIN: [id number]               Age:   37 years     Sex:  Female     :  1980   Associated Diagnoses:   Pre-employment examination   Author:   Tay Webber PA-C      Health Status   Allergies:    Allergic Reactions (Selected)  Severity Not Documented  Coumadin (No reactions were documented)  Cymbalta (Groggy)   Medications: not on any regular medications   Problem list:    All Problems  Tension headache / SNOMED CT 8220785090 / Confirmed  Anxiety disorder / SNOMED CT 4ET38C23-925J-15Q6-QO59-ZXE57969R413 / Confirmed  Chronic fatigue disorder / SNOMED CT 53F41446-4LM4-4679-RU85-A2MN38F06I05 / Confirmed  Irritable bowel syndrome (IBS) / SNOMED CT [id number]-BY920SKTSU2F / Confirmed  Stress incontinence in female / SNOMED CT 82932516-RBQ1-872W-I2KG-9843311295L2 / Confirmed  Arthralgia / SNOMED CT 25757273 / Confirmed  Resolved: *Hospitalized@[facility] - Arthralgia of multiple joints  Resolved: Protein S deficiency / SNOMED CT 8600407541  Resolved: Infertility treatment / SNOMED CT 3355955120  Resolved: Pregnant / SNOMED CT 523422615  Resolved: DVT - Deep vein thrombosis of lower limb / SNOMED CT 2348018191  Resolved: Pregnancy / SNOMED CT 907884109  Resolved: PCOD - Polycystic ovarian disease / SNOMED CT 263719423      Chief Complaint   2018 8:01 AM CST    Patient is here for pre-employment physical.        Subjective   Here for pre-employment exam for [employer], she will be   no concerns today      Histories   Past Medical History:    Active  Stress incontinence in female (14251538-OFK6-736F-U5BV-6238050546Q5): Onset in  at 33 years.  Arthralgia (64761055)  Anxiety disorder (9WS00T42-771S-01X5-ZN05-YVG78067P983)  Irritable bowel syndrome (IBS) ([id number]-QN267ZFHPZ0Z)  Chronic fatigue disorder (76V66296-3RM7-5414-NG91-F5EC29F47M95)  Tension headache (9146266847)  Resolved  *Hospitalized@[facility] - Arthralgia of  multiple joints: Onset on 6/21/2010 at 29 years.  Resolved.  Pregnancy (648207414): Onset on 7/29/2006 at 26 years.  Resolved on 4/7/2007 at 26 years.  Protein S deficiency (8540079318):  Resolved.  DVT - Deep vein thrombosis of lower limb (0629026075):  Resolved.  PCOD - Polycystic ovarian disease (769924008):  Resolved.  Infertility treatment (0178424522):  Resolved.   Family History:    Hearing aid  Daughter  Son  Diabetes mellitus  Mother  Systemic lupus erythematosus  Sister  Rheumatoid arthritis  Sister  Heart disease  Father  Thyroid disease  Mother     Procedure history:    Colonoscopy (45.23) on 1/31/2014 at 33 Years.  Comments:  2/13/2014 2:15 PM - Diana Mahmood  Normal.  Esophagogastroduodenoscopy (375376169) on 1/31/2014 at 33 Years.  Laparoscopy (718255589) on 6/22/2012 at 31 Years.  Appendectomy (979176826).  Hernia repair (64673467).  knee surgery x3.  Intrauterine insemination (014539234).   Social History:        Alcohol Assessment            1 drink about once per week, 1 drinks/episode average.  Alcohol use interferes with work or home: No.  Drinks               more than intended: No.      Tobacco Assessment            Never      Substance Abuse Assessment            Never      Employment and Education Assessment            Unemployed, Work/School description: homemaker.      Home and Environment Assessment            Marital status: .  Lives with Children, Spouse.            Marital status: .  Spouse/Partner name: Amrik Bahena.  Lives with Self, Children, Spouse.  2 children.               Risks in environment: Unlocked guns.      Nutrition and Health Assessment            Type of diet: Regular, Gluten free, dairy free.  Wants to lose weight: Yes.  Sleeping concerns: Yes.  Feels               highly stressed: Yes.      Exercise and Physical Activity Assessment            Exercise frequency: 3-4 times/week.  Exercise type: Cardio.      Sexual Assessment            Sexually active:  Yes.  Sexual orientation: Heterosexual.        Objective       Vital Signs   1/29/2018 8:01 AM CST Temperature Tympanic 97.5 DegF  LOW    Peripheral Pulse Rate 73 bpm    HR Method Electronic    Systolic Blood Pressure 94 mmHg    Diastolic Blood Pressure 62 mmHg    Mean Arterial Pressure 73 mmHg    BP Site Right arm    BP Method Manual    Oxygen Saturation 98 %      Measurements from flowsheet : Measurements   1/29/2018 8:01 AM CST Height Measured - Standard 63 in    Weight Measured - Standard 130.6 lb    BSA 1.62 m2    Body Mass Index 23.13 kg/m2      General:  No acute distress.    Eye:  Pupils are equal, round and reactive to light, Extraocular movements are intact.    HENT:  Tympanic membranes are clear, No pharyngeal erythema, No sinus tenderness.    Neck:  Supple, Non-tender, No lymphadenopathy.    Respiratory:  Lungs are clear to auscultation.    Cardiovascular:  Normal rate, Regular rhythm, No murmur.    Gastrointestinal:  Soft, Non-tender, Non-distended, Normal bowel sounds, No organomegaly.    Musculoskeletal:  Normal range of motion.    Neurologic:  Cranial Nerves II-XII are grossly intact, Normal deep tendon reflexes.    Psychiatric:  Appropriate mood & affect.       Plan   Impression and Plan:     Diagnosis     Pre-employment examination (CII45-HD Z02.1).     .    Condition Form completed and signed.  Approved without restrictions   .    Orders     Orders   Charges (Evaluation and Management):  12941 periodic preventive med est patient 18-39 yrs (Charge) (Order): Quantity: 1, Pre-employment examination.     .

## 2022-02-15 NOTE — LETTER
(Inserted Image. Unable to display)   [address] [place] [address]  July 28, 2021      KENYABOO HERNANDEZ  [address] [place] [id number]        Dear KENYA,      Thank you for selecting [facility] ([facility]viously [facility] for your healthcare needs.      This letter is to inform you that we have received a copy of your mammogram results.  Your results were normal unless noted below.  You will also receive notice of your results from the radiologist within 7-10 days.  This letter is to let you know I have also received a copy and it is filed in your clinic chart.      Please plan on having your next mammogram done in 12 months.          Please contact me or my assistant at [phone] if you have any questions or concerns.     Sincerely,        Samuel Dawn MD

## 2022-02-15 NOTE — TELEPHONE ENCOUNTER
---------------------  From: Samuel Dawn MD   To: MARY KENYA CAMILLA    Sent: 7/22/2021 3:54:33 PM CDT  Subject: General Message     Reassuringly, labs all remain normal.  Supportive that this is not inflammatory bowel disease.  Clinically, still most consistent with irritable bowel syndrome (IBS) with diarrhea predominant features.  Continue trial of imodium + cholestyramine and plans to see GI specialty clinic again to see if recommended alternative prescription medication.        Results:  Date Result Name Value Ref Range   7/21/2021 7:59 AM Sodium Level 137 mmol/L (135 - 146)   7/21/2021 7:59 AM Potassium Level 4.3 mmol/L (3.5 - 5.3)   7/21/2021 7:59 AM Chloride Level 102 mmol/L (98 - 110)   7/21/2021 7:59 AM CO2 Level 24 mmol/L (20 - 32)   7/21/2021 7:59 AM Glucose Level 77 mg/dL (65 - 99)   7/21/2021 7:59 AM BUN 16 mg/dL (7 - 25)   7/21/2021 7:59 AM Creatinine Level 0.85 mg/dL (0.50 - 1.10)   7/21/2021 7:59 AM BUN/Creat Ratio NOT APPLICABLE (6 - 22)   7/21/2021 7:59 AM eGFR 85 mL/min/1.73m2 (> OR = 60 - )   7/21/2021 7:59 AM eGFR African American 99 mL/min/1.73m2 (> OR = 60 - )   7/21/2021 7:59 AM Calcium Level 9.3 mg/dL (8.6 - 10.2)   7/21/2021 7:59 AM C-Reactive Protein (CRP) 2.0 mg/L ( - <8.0)   7/21/2021 7:59 AM TSH 1.94 mIU/L    7/21/2021 7:59 AM WBC 10.0 (3.8 - 10.8)   7/21/2021 7:59 AM RBC 4.70 (3.80 - 5.10)   7/21/2021 7:59 AM Hgb 14.3 gm/dL (11.7 - 15.5)   7/21/2021 7:59 AM Hct 43.4 % (35.0 - 45.0)   7/21/2021 7:59 AM MCV 92.3 fL (80.0 - 100.0)   7/21/2021 7:59 AM MCH 30.4 pg (27.0 - 33.0)   7/21/2021 7:59 AM MCHC 32.9 gm/dL (32.0 - 36.0)   7/21/2021 7:59 AM RDW 12.2 % (11.0 - 15.0)   7/21/2021 7:59 AM Platelet 260 (140 - 400)   7/21/2021 7:59 AM MPV 10.6 fL (7.5 - 12.5)   7/21/2021 7:59 AM Lymphocytes 26.8 %    7/21/2021 7:59 AM Abs Lymphocytes 2,680 (850 - 3,900)   7/21/2021 7:59 AM Neutrophils 61.4 %    7/21/2021 7:59 AM Abs Neutrophils 6,140 (1,500 - 7,800)   7/21/2021 7:59 AM Monocytes 7.9 %     7/21/2021 7:59 AM Abs Monocytes 790 (200 - 950)   7/21/2021 7:59 AM Eosinophils 3.3 %    7/21/2021 7:59 AM Abs Eosinophils 330 (15 - 500)   7/21/2021 7:59 AM Basophils 0.6 %    7/21/2021 7:59 AM Abs Basophils 60 (0 - 200)   7/21/2021 7:59 AM Sed Rate 2 ( - < OR = 20)

## 2022-02-15 NOTE — PROGRESS NOTES
Patient:   LAZARA HERNANDEZ            MRN: [id number]            FIN: [id number]               Age:   36 years     Sex:  Female     :  1980   Associated Diagnoses:   Tension headache; Tension headache   Author:   Samuel Dawn MD      Visit Information      Date of Service: 2017 01:11 pm  Performing Location: [facility]  Encounter#: [id number]      Primary Care Provider (PCP):  Samuel Dawn MD    NPI# [id number]      Referring Provider:  No referring provider recorded for selected visit.      Chief Complaint   2017 1:14 PM CST     Pt here for f/u headaches. She doesn't feel like the medication is working at all. She does go to PT twice a week which does help.      History of Present Illness   2015: Lazara presents to clinic with recurrent irritable bowel symptoms.  She had similar incidents approximately 1 year ago and underwent extensive gastroenterology evaluation at that time.  She found intermittent use of loperamide to be most effective.  Now she is having copious diarrhea approximately 6-10 times daily.  Also questions whether any of this could be due to underlying vitamin D deficiency.    2015: Presents to clinic with persistent IBS symptoms.  She says she is using loperamide like in the.  Symptoms typically worse in the morning for approximately 5 AM to noon.  She says she will awaken in the early morning hours with diarrheal complaints.  She is not interested in going back to gastroenterology.  She does describe significant abdominal cramping.  No substantial constipated component    2016: Lazara presents to clinic with three-day history of profound fatigue, and no energy to do anything.  No recent sicknesses or illnesses.  Now feels like she just does not have the energy to get through the day.  Frequently will take naps in the car during children s activities.  No described depression.  Her life at baseline stressful though no recent changes.  She did perform a home  urine pregnancy test 1 week ago which was normal.  She feels that sleep has not been an issue; typically sleeping on average of 7 hours per night.  Describes diffuse myofascial pains.  Previous bowel issues have resolved with gluten-free trial diet.      1/4/2017: Lazara returns to clinic for a reevaluation of her tension type headache. She complains of a band like feeling around her head and extending into her neck and shoulders. She was seen last month by Dr. Lu, and prescribed PT and amitriptyline. She did not  the amitryptyline as she was unsure to how it works and why it would be beneficial. She did see PT twice a week and has recieved some benefit from it. She also had resolution of her bowel issues due to limiting certain food items.       Review of Systems   Constitutional:  Negative.    Eye:  Negative.    Ear/Nose/Mouth/Throat:  Negative.    Respiratory:  Negative.    Cardiovascular:  Negative.    Gastrointestinal:  Negative.    Musculoskeletal:  Neck pain.         Back pain: Bilaterally, In the upper region.    Neurologic:  Alert and oriented X4, Headache.    Psychiatric:  Negative.       Health Status   Allergies:    Allergic Reactions (Selected)  Severity Not Documented  Coumadin (No reactions were documented)   Problem list:    All Problems (Selected)  Arthralgia / ICD-9-.40 / Confirmed      Histories   Past Medical History:    Active  Arthralgia (719.40)  Resolved  *Hospitalized@[facility] - Arthralgia of multiple joints: Onset on 6/21/2010 at 29 years.  Resolved.  Pregnancy (639954993): Onset on 7/29/2006 at 26 years.  Resolved on 4/7/2007 at 26 years.  Protein S deficiency (7336137230):  Resolved.  DVT - Deep vein thrombosis of lower limb (1080564308):  Resolved.  PCOD - Polycystic ovarian disease (759589739):  Resolved.  Infertility treatment (8759644623):  Resolved.   Family History:    Diabetes mellitus  Mother  Systemic lupus erythematosus  Sister  Rheumatoid arthritis  Sister  Heart  disease  Father     Procedure history:    Colonoscopy (45.23) on 1/31/2014 at 33 Years.  Comments:  2/13/2014 2:15 PM - Timoteo Diaan  Normal.  Esophagogastroduodenoscopy (242318949) on 1/31/2014 at 33 Years.  Laparoscopy (596705640) on 6/22/2012 at 31 Years.  Appendectomy (044454808).  Hernia repair (17808038).  knee surgery x3.   Social History:        Alcohol Assessment            Never, 1-2 times per week, 1 drinks/episode average.      Tobacco Assessment            Never      Substance Abuse Assessment            Never      Employment and Education Assessment            Unemployed      Home and Environment Assessment            Marital status: .            Lives with Self, Children, Spouse.      Nutrition and Health Assessment            Type of diet: Regular.      Exercise and Physical Activity Assessment            Exercise frequency: 3-4 times/week.  Exercise type: Cardio.      Sexual Assessment            Sexually active: Yes.  Sexual orientation: Heterosexual.        Physical Examination   Vital Signs   1/4/2017 1:14 PM CST Temperature Tympanic 98.2 DegF    Peripheral Pulse Rate 88 bpm    Pulse Site Radial artery    Systolic Blood Pressure 110 mmHg    Diastolic Blood Pressure 72 mmHg    Mean Arterial Pressure 85 mmHg    BP Site Right arm      Measurements from flowsheet : Measurements   1/4/2017 1:14 PM CST Height Measured - Standard 62 in    Weight Measured - Standard 125.6 lb    BSA 1.58 m2    Body Mass Index 22.97 kg/m2      General:  Alert and oriented, No acute distress.    Eye:  Pupils are equal, round and reactive to light, Extraocular movements are intact, Normal conjunctiva.    HENT:  Normocephalic, Oral mucosa is moist, No pharyngeal erythema.    Neck:  Supple, Non-tender, No carotid bruit, No lymphadenopathy, No thyromegaly.    Respiratory:  Lungs are clear to auscultation.    Cardiovascular:  Normal rate, Regular rhythm, No murmur, No gallop.    Neurologic:  Alert, Oriented, Normal  sensory, Normal motor function, No focal deficits, Cranial Nerves II-XII are grossly intact, Normal deep tendon reflexes.    Psychiatric:  Cooperative, Appropriate mood & affect.       Impression and Plan   Diagnosis     Tension headache (LJE62-PP G44.209).     Course:  Improving.    Plan:  Will continue PT and otc medications. Will start amitriptyline, and increase doses every 5-7 days. Call if issues or side effects. Also had osteopathic manipulation done by Kieran PRAKASH IV in office with improvement of neck and back pain. .    Orders     Orders (Selected)   Prescriptions  Prescribed  amitriptyline 10 mg oral tablet: See Instructions, Instructions: 1 tab(s) po hs; increase by 10mg every 5-7 days to goal of 30mg, # 90 tab(s), 3 Refill(s), Type: Maintenance, Pharmacy: [id number], 1 tab(s) po hs; increase by 10mg every 5-7 days to goal of 30mg.

## 2022-02-15 NOTE — PROGRESS NOTES
Patient:   KENYA HERNANDEZ            MRN: [id number]            FIN: [id number]               Age:   37 years     Sex:  Female     :  1980   Associated Diagnoses:   Trigger point   Author:   Dale Malagon MD      Subjective   Chief complaint 10/30/2017 10:18 AM CDT  pt here for discussion of trigger point injections for migraines,she has discussed with pt and thinks it may be a good idea.     see chief complaint as noted above and confirmed with the patient  This 37-year-old female is here requesting trigger point injections.  She has struggled for a long time with migraines and some fibromyalgia.  She has been on multiple medications with only some help.           Health Status   Allergies:    Allergic Reactions (Selected)  Severity Not Documented  Coumadin (No reactions were documented)  Cymbalta (Groggy)   Medications:  (Selected)   Prescriptions  Prescribed  amitriptyline 25 mg oral tablet: 1 tab(s) ( 25 mg ), po, hs, # 30 tab(s), 2 Refill(s), Type: Maintenance, Pharmacy: [facility] [id number], keep on hold and pt will notify when needed.  In place of Cymbalta, 1 tab(s) po hs  loperamide 2 mg oral capsule: 1 cap(s) ( 2 mg ), PO, q4hr, PRN: for loose stools, # 60 cap(s), 0 Refill(s), Type: Maintenance, Pharmacy: [facility] [id number], 1 cap(s) po q4 hrs,PRN:for loose stools   Problem list:    All Problems (Selected)  Arthralgia / [id number] / Confirmed  Anxiety disorder / [id number] / Confirmed  Irritable bowel syndrome (IBS) / 3GSLO427-40T5-150Z-SE9T-NH745AKHUH0J / Confirmed  Stress incontinence in female / [id number] / Confirmed  Chronic fatigue disorder / [id number]-RI19-J6ZW84T39D06 / Confirmed  Tension headache / [id number] / Confirmed      Objective   Vital Signs   10/30/2017 10:18 AM CDT Peripheral Pulse Rate 83 bpm    Pulse Site Radial artery    Systolic Blood Pressure 104 mmHg    Diastolic Blood Pressure 64 mmHg    Mean Arterial  Pressure 77 mmHg    BP Site Right arm    Oxygen Saturation 97 %      Measurements from flowsheet : Measurements   10/30/2017 10:18 AM CDT Height Measured - Standard 62 in    Weight Measured - Standard 130 lb    BSA 1.6 m2    Body Mass Index 23.77 kg/m2      General:  Alert and oriented, No acute distress.    Respiratory:  Respirations are non-labored.    Psychiatric:  Cooperative, Appropriate mood & affect.    I note one point over the trapezius, one at the base of the trapezius in the neck, and then the nuchal ridge on the left and the nuchal ridge on the right as out points that are tender and she identifies them as a chronic daily tenderness.      Impression and Plan   Assessment and Plan:          Diagnosis: Trigger point (AON84-EO M79.2).         Course: Four persistent trigger points identified.  These were cleansed and then injected with 1 cc of lidocaine and 1 cc of Solu-Medrol.  Fortunately she did note some immediate relief and improvement.  I have reviewed other potential interventions such as acupuncture and acute migraine occipital blocks..

## 2022-02-15 NOTE — PROGRESS NOTES
Patient:   KENYA HERNANDEZ            MRN: [id number]            FIN: [id number]               Age:   40 years     Sex:  Female     :  1980   Associated Diagnoses:   Close exposure to 2019 novel coronavirus; Sore throat   Author:   Sarahi Schuster      Visit Information      Date of Service: 2021 08:46 am  Performing Location: [facility]  Encounter#: [id number]      Primary Care Provider (PCP):  Samuel Dawn MD    NPI# [id number]      Referring Provider:  Sarahi Schuster    NPI# [id number]   Visit type:  video.    Participants in room during visit:  _pt   Location of patient:  _home  Location of provider:  _ clinic  Video Start Time:  1002  Video End Time:   _1007    Today's visit was conducted via video conference due to the COVID-19 pandemic.  The patient's consent to proceed with a video visit has been obtained and documented.      Chief Complaint   3/4/2021 9:39 AM CST     sore throat that started yesterday, would like to be COVID tested so she can return to work - verbal consent for video on smartphone        History of Present Illness   Patient is a 40_ year old female_ who is being evaluated via a billable video visit.  sore throat onset yesterday  would like to be COVID tested so  can return to work  will test her daughter for strep as everyone also has sore throat  no fever, good po intake, minimal nasal congestion, no cough      Health Status   Allergies:    Allergic Reactions (Selected)  Severity Not Documented  Coumadin (No reactions were documented)  Cymbalta (Groggy)   Medications:  (Selected)   Documented Medications  Documented  nortriptyline 10 mg oral capsule: = 5 cap(s) ( 50 mg ), Oral, hs, 0 Refill(s), Type: Maintenance,    Medications          *denotes recorded medication          *nortriptyline 10 mg oral capsule: 50 mg, 5 cap(s), Oral, hs, 0 Refill(s).       Problem list:    All Problems  Anxiety disorder / SNOMED CT 3PW14C33-936S-15O1-DJ09-MHJ83270J168 /  Confirmed  Chronic fatigue disorder / SNOMED CT 83P83331-8GM4-2171-OI97-M9WB98Q95J09 / Confirmed  H/O migraine / SNOMED CT 902054559 / Confirmed  Irritable bowel syndrome (IBS) / SNOMED CT 3JFVV137-82R2-104R-FQ9I-RR225NJJMW3U / Confirmed  Arthralgia / SNOMED CT 07066312 / Confirmed  Deficiency, protein S / SNOMED CT 3483976 / Confirmed  Stress incontinence in female / SNOMED CT 69032247-OYL4-901Y-J3XL-2727836752L2 / Confirmed  Tension headache / SNOMED CT 9120058289 / Confirmed      Histories   Past Medical History:    Active  Stress incontinence in female (60713440-TSS7-825Y-G4GI-3970712906I0): Onset in 2013 at 33 years.  Arthralgia (53158698)  Anxiety disorder (1AV41R34-478N-15R3-TK48-EIN31015T427)  Irritable bowel syndrome (IBS) (9JUAQ450-59T9-825L-SB2O-WA767ATUSD0R)  Chronic fatigue disorder (08S01097-5TR1-6536-AF16-Y1KT69Q22A47)  Tension headache (9651990075)  Deficiency, protein S (3991581)  Resolved  *Hospitalized@[facility] - Arthralgia of multiple joints: Onset on 6/21/2010 at 29 years.  Resolved.  Pregnancy (278210025): Onset on 7/29/2006 at 26 years.  Resolved on 4/7/2007 at 26 years.  Protein S deficiency (7915986875):  Resolved.  DVT - Deep vein thrombosis of lower limb (7062536163):  Resolved.  PCOD - Polycystic ovarian disease (528088590):  Resolved.  Infertility treatment (3900177291):  Resolved.   Family History:    Hearing aid  Daughter  Son  Diabetes mellitus  Mother  Systemic lupus erythematosus  Sister  Rheumatoid arthritis  Sister  Heart disease  Father  Thyroid disease  Mother     Procedure history:    Colonoscopy (ICD-9-CM 45.23) performed by Wayne Butts MD on 1/31/2014 at 33 Years.  Comments:  2/13/2014 2:15 PM CST - Diana Mahmood  Normal.  Esophagogastroduodenoscopy (SNOMED CT 465402802) performed by Wayne Butts MD on 1/31/2014 at 33 Years.  [facility] performed by Trey Pederson MD on 6/22/2012 at 31 Years.  Appendectomy ([facility] [place] 686534543).  Hernia repair ([facility] [place]  43244810).  knee surgery x3.  Intrauterine insemination ([facility] [place] [id number]).   Social History:        Electronic Cigarette/Vaping Assessment            Electronic Cigarette Use: Never.      Alcohol Assessment: Current            1 drink about once per week, Wine (5 oz), 1-2 times per month, 1 drinks/episode average.  Alcohol use               interferes with work or home: No.  Drinks more than intended: No.  Ready to change: No.      Tobacco Assessment            Never (less than 100 in lifetime)      Substance Abuse Assessment: Denies Substance Abuse            Never      Employment and Education Assessment            Work/School description: .  Highest education level: College.      Home and Environment Assessment            Marital status: .  Lives with Children, Spouse.  Living situation: Home/Independent.               Injuries/Abuse/Neglect in household: No.  Feels unsafe at home: No.  Family/Friends available for support:               Yes.            Marital status: .  Spouse/Partner name: Amrik Bahena.  Lives with Self, Children, Spouse.  2 children.               Risks in environment: Unlocked guns.      Nutrition and Health Assessment            Type of diet: Regular, Gluten free, dairy free.  Wants to lose weight: Yes.  Sleeping concerns: Yes.  Feels               highly stressed: Yes.      Exercise and Physical Activity Assessment            Exercise frequency: 3-4 times/week.  Exercise type: Cardio.            Exercise frequency: 1-2 times/week.      Sexual Assessment            Sexually active: Yes.  Sexual orientation: Heterosexual.            Identifies as female, History of STD: No.  Uses condoms: Yes.  History of sexual abuse: No.      Other Assessment            Irregular menses.  Painful periods.        Physical Examination   General:  Alert and oriented, No acute distress.    Eye:  Normal conjunctiva.    Respiratory:  Respirations are non-labored.     Psychiatric:  Cooperative, Appropriate mood & affect, Normal judgment.       Impression and Plan   Diagnosis     Close exposure to 2019 novel coronavirus (NNA29-IG Z20.822).     Sore throat (EMF31-HO J02.9).     Patient Instructions:       Counseled: Patient, Patient is referred for [facility] COVID-19 testing and is instructed of the following:  Patient should remain isolated until results of test return and given that tests are not 100% accurate, would be safest to assume that they are contagious with COVID-19 until their symptoms have fully resolved. Isolation is recommended for at least 7 days from the onset of symptoms and for 3 days after resolution of fevers and productive cough, unless test is positive, or exposure with COVID positive contact is confirmed, then isolation should be for 14 days. This means patient should not go to work or any public areas. In addition, it is recommended at home that they separate themselves from other people and from animals as much as possible, including using a separate bathroom. If they do need to be around others, a face mask is recommended. Frequent hand hygiene and cleaning of high touch surfaces is also recommended.   Symptoms can last for several weeks. For patients with COVID-19, they can sometimes start to improve and then get worse again. If symptoms worsen at any time, including significant shortness of breath, low oxygen levels, high fevers that cannot be controlled, or concerns for dehydration, they should seek medical care. If going to the ER, calling 911, or seeking care at the clinic, they are reminded to notify staff that they have been tested for COVID-19.  Patient also is informed that testing will be done in their car at a scheduled time.   Patient is also informed that testing for COVID-19 must be reported to the public health department along with contact information for the patient.   Patient information is given to scheduling staff to get patient  scheduled for testing. Patient will receive further instructions from scheduling staff.  Patient is encouraged to call back at any time with questions or concerns.  .    Orders     Orders (Selected)   Outpatient Orders  Ordered  SARS-CoV-2 RNA (COVID-19), Qualitative NAAT (Request): Close exposure to 2019 novel coronavirus  Sore throat.

## 2022-02-15 NOTE — LETTER
(Inserted Image. Unable to display)                                                                                                                                                        July 23, 2021Re: KENYA HERNANDEZ1980  [facility]  1974 [facility] [address] [place] [id number]:   MNGiThe following patient has been referred to your office/practice:  KENYA HERNANDEZ Appointment:  Appointment is PendingPlease refer to the attached  clinical documentation for a summary of KENYA's care.  Please do not hesitate to contact our office if any additional clinical questions arise.  All relevant records and transition of care documents should be mailed or faxed.Your assistance in providing continuity of care is appreciated. Sincerely, [address] [address](P) [phone](F) [phone]

## 2022-02-15 NOTE — PROGRESS NOTES
Patient:   KENYA HERNANDEZ            MRN: [id number]            FIN: [id number]               Age:   39 years     Sex:  Female     :  1980   Associated Diagnoses:   Menorrhagia with irregular cycle   Author:   Vaughn Collins MD      Visit Information      Date of Service: 2019 04:17 pm  Performing Location: [facility]  Encounter#: [id number]      Primary Care Provider (PCP):  Samuel Dawn MD    NPI# [id number]      Referring Provider:  Vaughn Collins MD    NPI# [id number]      Chief Complaint   2019 4:42 PM CDT    Annual exam. Discuss irregular menses. Hx of ocp in college - none since. Night sweats/hot flashes.      Interval History   The patient is a 39-year-old, para 2 female with a history of infertility issues in today with irregular menses.  She became pregnant 6 and 12 years ago with utilization of ovulation induction and probable progesterone supplementation.  She has been having irregular menses probably over the past year with recently having prolonged bleeding and this has become more of an issue with irregularity of bleeding and she also reports a lot of hot flashes.  She deals with other issues including irritable bowel symptoms which have been difficult to treat but they do not seem to be related to her bleeding pattern.  She has really had quite severe issues with headaches and has gone through most all medications and is currently considering monthly preventative injections working with a neurologist.  Again, her headaches do not seem to be related to her bleeding patterns.  She is concerned about more serious issues with her prolonged bleeding recently.  She does not really have problems with dysmenorrhea.  The hot flashes are, however, quite irritating and can happen at any time and she brings sweaters along if she has temperature intolerance followed by cooling and often will have hot flashes where she has to change her clothing either at night or during the daytime.  She  would like to have Pap smear and annual type visit.  Review of systems is negative other than present illness issues.        Review of Systems   Review  of systems is negative except as documented under interval history.      Health Status   Allergies:    Allergic Reactions (Selected)  Severity Not Documented  Coumadin (No reactions were documented)  Cymbalta (Groggy)   Medications:  (Selected)   Prescriptions  Prescribed  Lomotil 2.5 mg-0.025 mg oral tablet: 1 tab(s), po, q6 hrs, PRN: as needed for loose stool, # 100 tab(s), 1 Refill(s), Type: Maintenance, Pharmacy: [id number], 1 tab(s) Oral q6 hrs,PRN:as needed for loose stool  Prometrium 100 mg oral capsule: = 1 cap(s) ( 100 mg ), Oral, daily, # 30 cap(s), 1 Refill(s), Type: Maintenance, Pharmacy: [id number], 1 cap(s) Oral daily  Documented Medications  Documented  Imodium A-D: ( 2 mg ), Oral, q4 hrs, 0 Refill(s), Type: Maintenance  Maxalt: Oral, daily, PRN: as needed for migraine headache, 0 Refill(s), Type: Maintenance  Tums: ( 500 mg ), Chewed, daily, 0 Refill(s), Type: Maintenance   Problem list:    All Problems  Arthralgia / SNOMED CT 46897203 / Confirmed  Anxiety disorder / SNOMED CT 7IP34F53-014U-35B4-GT87-XJG20806Z182 / Confirmed  Irritable bowel syndrome (IBS) / SNOMED CT 2KGAK291-86H7-254X-YB8V-ZK849PVRFQ9K / Confirmed  Stress incontinence in female / SNOMED CT 41651176-HSJ0-947A-A4YN-4466170790J7 / Confirmed  Chronic fatigue disorder / SNOMED CT 57R88150-1BE7-3098-SL73-H9CD41K66U70 / Confirmed  Tension headache / SNOMED CT 9638102328 / Confirmed  H/O migraine / SNOMED CT 015464970 / Confirmed  Resolved: Protein S deficiency / SNOMED CT 2968589366  Resolved: DVT - Deep vein thrombosis of lower limb / SNOMED CT 3917529912  Resolved: PCOD - Polycystic ovarian disease / SNOMED CT 604526006  Resolved: Infertility treatment / SNOMED CT 6132857180  Resolved: *Hospitalized@[facility] - Arthralgia of multiple joints  Resolved:  Pregnancy / SNOMED CT 633986515  Resolved: Pregnant / SNOMED CT 166968907      Histories   Past Medical History:    Active  Stress incontinence in female (09624617-DKL1-757B-P6IP-7063911525N8): Onset in 2013 at 33 years.  Arthralgia (75599232)  Anxiety disorder (0OY27D32-657Q-85W0-LR66-HYE75612O112)  Irritable bowel syndrome (IBS) (3SXWQ756-36C9-156Z-KM5F-OK742ITVDD6P)  Chronic fatigue disorder (42W52386-1WB8-0008-BD21-K5PE39S18T41)  Tension headache (8544196559)  Resolved  *Hospitalized@[facility] - Arthralgia of multiple joints: Onset on 6/21/2010 at 29 years.  Resolved.  Pregnancy (132219823): Onset on 7/29/2006 at 26 years.  Resolved on 4/7/2007 at 26 years.  Protein S deficiency (0771902350):  Resolved.  DVT - Deep vein thrombosis of lower limb (4977815195):  Resolved.  PCOD - Polycystic ovarian disease (489017741):  Resolved.  Infertility treatment (7763598323):  Resolved.   Family History:    Hearing aid  Daughter  Son  Diabetes mellitus  Mother  Systemic lupus erythematosus  Sister  Rheumatoid arthritis  Sister  Heart disease  Father  Thyroid disease  Mother     Procedure history:    Colonoscopy (45.23) on 1/31/2014 at 33 Years.  Comments:  2/13/2014 2:15 PM CST - Diana Mahmood  Normal.  Esophagogastroduodenoscopy (530774881) on 1/31/2014 at 33 Years.  Laparoscopy (272772722) on 6/22/2012 at 31 Years.  Appendectomy (369547131).  Hernia repair (75195883).  knee surgery x3.  Intrauterine insemination (891547485).   Social History:        Alcohol Assessment            1 drink about once per week, 1 drinks/episode average.  Alcohol use interferes with work or home: No.  Drinks               more than intended: No.      Tobacco Assessment            Never      Substance Abuse Assessment            Never      Employment and Education Assessment            Unemployed, Work/School description: homemaker.      Home and Environment Assessment            Marital status: .  Lives with Children, Spouse.             Marital status: .  Spouse/Partner name: Amrik Bahena.  Lives with Self, Children, Spouse.  2 children.               Risks in environment: Unlocked guns.      Nutrition and Health Assessment            Type of diet: Regular, Gluten free, dairy free.  Wants to lose weight: Yes.  Sleeping concerns: Yes.  Feels               highly stressed: Yes.      Exercise and Physical Activity Assessment            Exercise frequency: 3-4 times/week.  Exercise type: Cardio.      Sexual Assessment            Sexually active: Yes.  Sexual orientation: Heterosexual.        Physical Examination   Vital Signs   8/14/2019 4:42 PM CDT Temperature Tympanic 98.4 DegF    Peripheral Pulse Rate 76 bpm    Pulse Site Radial artery    HR Method Manual    Systolic Blood Pressure 108 mmHg    Diastolic Blood Pressure 72 mmHg    Mean Arterial Pressure 84 mmHg    BP Site Right arm    BP Method Manual      Gastrointestinal:  Abdominal examination is normal..    Gynecologic:  External genitalia, vagina, and cervix are normal.  Pap smear is taken.  Ultrasound is done..       Review / Management   Radiology results   Ultrasound, Vaginal probe pelvic ultrasound finds the uterus retroverted and normal in size.  It measures 8 x 4 x 4 cm.  Endometrium is fairly thin throughout with the thickest area found at about 4 mm.  The myometrium appears normal.  There is no fluid in the cul-de-sac.  The right ovary is 3.6 cm but mainly enlarged because of a simple cyst.  The left ovary is small at 2 cm with no active follicles.      Impression and Plan   Diagnosis     Menorrhagia with irregular cycle (LOK79-WL N92.1).     Menometrorrhagia.  Oligoovulation.  Probably anovulatory cycles and possible diminished ovarian reserve of eggs.  This last cycle probably was anovulatory with persistent follicle and no sign of corpus luteum with now thinned endometrium after prolonged bleeding..     Plan:  The patient has a history of deep venous thrombosis and is not a  candidate for OCP control of bleeding and perhaps treatment of hypoestrogenic state.  Therefore, we have to go with progestins or other therapy.  We discussed Provera on a monthly basis and the patient probably is a better candidate for Prometrium since she is lacking progesterone but probably still producing some estrogen.  We will try cycling on Prometrium two weeks out of every month and expect menses as withdrawal from that.  We could do IUD with Mirena to keep the lining of the uterus thin and prevent bleeding.  We will see how she does on the first couple of cycles of Prometrium and perhaps go to Mirena for bleeding control at some point.  We had a fairly long discussion regarding these issues and I did not feel that changing her hormones with adding progesterone will aggravate her other medical issues; however, Provera could aggravate her headaches potentially.  She will get back for further discussion if this is ineffective..    Patient Instructions:       Counseled: Patient, Regarding diagnosis, Regarding treatment, Regarding medications, Verbalized understanding.

## 2022-02-15 NOTE — PROGRESS NOTES
Patient:   KENYA HERNANDEZ            MRN: [id number]            FIN: [id number]               Age:   37 years     Sex:  Female     :  1980   Associated Diagnoses:   Tension headache   Author:   Candice Dumont MD      Visit Information      Date of Service: 2017 10:00 am  Performing Location: [facility]  Encounter#: [id number]      Primary Care Provider (PCP):  Samuel Dawn MD    NPI# [id number]      Referring Provider:  Candice Dumont MD    NPI# [id number]      Chief Complaint   2017 10:05 AM CDT   acupuncture consult        History of Present Illness   Acupuncture Consultation   .  Discussed with patient:  Risks include risk of pain bleeding infection injury to surrounding tissue and need for further treatment.  Also about 1 and 1000 patients may experience a vasovagal reaction or lightheadedness.  There is a possibility that there might be some initial worsening of symptoms.  There may be no benefit with the treatment.  Indication is for treatment of patient's chronic condition.  Alternatives include doing nothing, continuing with traditional Western medicine, or seeing another acupuncturist.  Needle placement for acupuncture will be based on Traditional Chinese Medicine and clinical knowledge.  .       .  Patient has chronic headaches.            Review of Systems   Constitutional:  Negative except as documented in history of present illness.    Eye:  Negative except as documented in history of present illness.    Ear/Nose/Mouth/Throat:  Negative except as documented in history of present illness.    Respiratory:  Negative except as documented in history of present illness.    Cardiovascular:  Negative except as documented in history of present illness.    Gastrointestinal:  Negative except as documented in history of present illness.    Immunologic:  Negative except as documented in history of present illness.    Integumentary:  Negative except as documented in history of  present illness.    Neurologic:  Negative except as documented in history of present illness.              Health Status   Allergies:    Allergic Reactions (Selected)  Severity Not Documented  Coumadin (No reactions were documented)  Cymbalta (Groggy)   Medications:  (Selected)   Prescriptions  Prescribed  amitriptyline 25 mg oral tablet: 1 tab(s) ( 25 mg ), po, hs, # 30 tab(s), 2 Refill(s), Type: Maintenance, Pharmacy: [facility] [id number], keep on hold and pt will notify when needed.  In place of Cymbalta, 1 tab(s) po hs  loperamide 2 mg oral capsule: 1 cap(s) ( 2 mg ), PO, q4hr, PRN: for loose stools, # 60 cap(s), 0 Refill(s), Type: Maintenance, Pharmacy: [facility] [id number], 1 cap(s) po q4 hrs,PRN:for loose stools   Problem list:    All Problems  Anxiety disorder / SNOMED CT 5IJ34F66-706Z-98I4-WJ80-FQX18976J399 / Confirmed  Arthralgia / SNOMED CT 11460804 / Confirmed  Chronic fatigue disorder / SNOMED CT 29X56687-4GL2-4223-ON45-G8AF79H83M48 / Confirmed  Irritable bowel syndrome (IBS) / SNOMED CT 4XIUC561-87T5-332Y-MI0E-PB282SMLPY3G / Confirmed  Stress incontinence in female / SNOMED CT 28754966-MPM3-417J-Z9HH-6619216976Q3 / Confirmed  Tension headache / SNOMED CT 5226084803 / Confirmed  Resolved: Pregnant / SNOMED CT 274966888  Resolved: *Hospitalized@[facility] - Arthralgia of multiple joints  Resolved: DVT - Deep vein thrombosis of lower limb / SNOMED CT 4365544782  Resolved: Infertility treatment / SNOMED CT 8475710181  Resolved: PCOD - Polycystic ovarian disease / SNOMED CT 952828134  Resolved: Pregnancy / SNOMED CT 107260086  Resolved: Protein S deficiency / SNOMED CT 3803141406      Histories   Past Medical History:    Active  Stress incontinence in female (76137890-QTD4-550J-F3WB-0356447209M6): Onset in 2013 at 33 years.  Arthralgia (47964848)  Anxiety disorder (6UG11E33-450E-91M3-AN35-LAL54618W101)  Irritable bowel syndrome (IBS) (9OZOU963-41Z6-211X-JK3J-RV533UEIHA6T)  Chronic fatigue disorder  (59A52323-3AD5-8600-YU02-V6UG69X14O95)  Tension headache (0796457740)  Resolved  *Hospitalized@[facility] - Arthralgia of multiple joints: Onset on 6/21/2010 at 29 years.  Resolved.  Pregnancy (500755422): Onset on 7/29/2006 at 26 years.  Resolved on 4/7/2007 at 26 years.  Protein S deficiency (4250296111):  Resolved.  DVT - Deep vein thrombosis of lower limb (2093507030):  Resolved.  PCOD - Polycystic ovarian disease (683410173):  Resolved.  Infertility treatment (3767874752):  Resolved.   Family History:    Hearing aid  Daughter  Son  Diabetes mellitus  Mother  Systemic lupus erythematosus  Sister  Rheumatoid arthritis  Sister  Heart disease  Father  Thyroid disease  Mother     Procedure history:    Colonoscopy (45.23) on 1/31/2014 at 33 Years.  Comments:  2/13/2014 2:15 PM - Diana Mahmood  Normal.  Esophagogastroduodenoscopy (642579001) on 1/31/2014 at 33 Years.  Laparoscopy (897181227) on 6/22/2012 at 31 Years.  Appendectomy (973332642).  Hernia repair (85702804).  knee surgery x3.  Intrauterine insemination (793650139).   Social History:        Alcohol Assessment            1 drink about once per week, 1 drinks/episode average.  Alcohol use interferes with work or home: No.  Drinks               more than intended: No.      Tobacco Assessment            Never      Substance Abuse Assessment            Never      Employment and Education Assessment            Unemployed, Work/School description: homemaker.      Home and Environment Assessment            Marital status: .  Lives with Children, Spouse.            Marital status: .  Spouse/Partner name: Amrik Bahena.  Lives with Self, Children, Spouse.  2 children.               Risks in environment: Unlocked guns.      Nutrition and Health Assessment            Type of diet: Regular, Gluten free, dairy free.  Wants to lose weight: Yes.  Sleeping concerns: Yes.  Feels               highly stressed: Yes.      Exercise and Physical Activity Assessment             Exercise frequency: 3-4 times/week.  Exercise type: Cardio.      Sexual Assessment            Sexually active: Yes.  Sexual orientation: Heterosexual.        Physical Examination   Vital Signs   11/1/2017 10:05 AM CDT Temperature Tympanic 98.1 DegF    Peripheral Pulse Rate 80 bpm    Pulse Site Radial artery    HR Method Manual    Systolic Blood Pressure 94 mmHg    Diastolic Blood Pressure 68 mmHg    Mean Arterial Pressure 77 mmHg    BP Site Right arm    BP Method Manual      Measurements from flowsheet : Measurements   11/1/2017 10:05 AM CDT Height Measured - Standard 62 in    Weight Measured - Standard 130 lb    BSA 1.6 m2    Body Mass Index 23.77 kg/m2      General:  Alert and oriented, No acute distress.    Eye:  Pupils are equal, round and reactive to light, Extraocular movements are intact, Normal conjunctiva.    HENT:  Normocephalic, hearing grossly normal during our conversation.    Respiratory:  Respirations are non-labored, Symmetrical chest wall expansion.    Cardiovascular:  Normal peripheral perfusion, brisk capillary refill.    Integumentary:  Warm, Dry, No rash.    Neurologic:  Alert.    Psychiatric:  Cooperative, Appropriate mood & affect.       Procedure   Procedure   Date:  11/1/2017.     Physical Exam: vital signs Vital Signs   11/1/2017 10:05 AM CDT Temperature Tympanic 98.1 DegF    Peripheral Pulse Rate 80 bpm    Pulse Site Radial artery    HR Method Manual    Systolic Blood Pressure 94 mmHg    Diastolic Blood Pressure 68 mmHg    Mean Arterial Pressure 77 mmHg    BP Site Right arm    BP Method Manual      .     Location: size and depth GV-20    Bilateral   LI-4  LV-3  GB-34  adams men  point zero  shoulder well   .     Procedure tolerated: well.     needles left in place for 25 minutes without electricity and without reinsertion.     pt reported a generalized sense of well being with treatment today..        Review / Management   Course:  Progressing as expected.       Impression and Plan    Diagnosis     Tension headache (NBW67-MP G44.209).     Treatment goals:  Decrease pain intensity, Improve psychological state, Improve quality of life.    Plan:  40 minutes spent with patient in direct face to face contact >50% spent counseling in addition to the time spent doing acupuncture today..    Patient Instructions:  Recommended that patient consider doing 6 treatments of acupuncture, 1 every 2 weeks, but that if there is no improvement after 3 treatments then we should not continue as it is unlikley to have benefit., Patient can also continue to follow up with PCP and specilaists reguarding chronic medical problems., Patient is aware that I can't promise that the treatment will be effective, that acupuncture does not need to replace traditional Western medical treatments but can work as a compliment to them, and that they should check with their insurance company to determine if it is covered, and be willing to pay for any portion that is not covered by insurance., We also discussed cupping, massage therapy, theracane, medications, physical therapy, e-stim..

## 2022-02-15 NOTE — PROGRESS NOTES
Patient:   LAZARA HERNANDEZ            MRN: [id number]            FIN: [id number]               Age:   37 years     Sex:  Female     :  1980   Associated Diagnoses:   Tension headache; Chronic myofascial pain   Author:   Samuel Dawn MD      Visit Information      Date of Service: 2017 09:38 am  Performing Location: [facility]  Encounter#: [id number]      Primary Care Provider (PCP):  Samuel Dawn MD    NPI# [id number]      Referring Provider:  Samuel Dawn MD    NPI# [id number]      Chief Complaint      History of Present Illness   2015: Lazara presents to clinic with recurrent irritable bowel symptoms.  She had similar incidents approximately 1 year ago and underwent extensive gastroenterology evaluation at that time.  She found intermittent use of loperamide to be most effective.  Now she is having copious diarrhea approximately 6-10 times daily.  Also questions whether any of this could be due to underlying vitamin D deficiency.        2017: Lazara returns to clinic for a reevaluation of her tension type headache. She complains of a band like feeling around her head and extending into her neck and shoulders. She was seen last month by Dr. Lu, and prescribed PT and amitriptyline. She did not  the amitryptyline as she was unsure to how it works and why it would be beneficial. She did see PT twice a week and has recieved some benefit from it. She also had resolution of her bowel issues due to limiting certain food items.      2017: Lazara returns to clinic for a reevaluation of her tension type headache. She complains of a band like feeling around her head and extending into her neck and shoulders. She was seen last month by Dr. Lu, and prescribed PT and amitriptyline. She did not  the amitryptyline as she was unsure to how it works and why it would be beneficial. She did see PT twice a week and has recieved some benefit from it. She also had resolution of her  bowel issues due to limiting certain food items.     9/19/2017: Presents for follow-up related to chronic tension headache.  Did titrate up amitriptyline up to 50 mg daily.  At higher doses was limited by profound grogginess and sedation.  Has been off the medicine now for approximately 3 weeks.  Since stopping the medicine describes having more abdominal issues and feels somewhat more anxious.  No substantial change in headache tendency.  Raises some concerns about potential medication related weight gain.         Review of Systems   Constitutional:  Negative.    Eye:  Negative.    Ear/Nose/Mouth/Throat:  Negative.    Respiratory:  Negative.    Cardiovascular:  Negative.    Gastrointestinal:  Diarrhea.    Musculoskeletal:  Neck pain.         Back pain: Bilaterally, In the upper region.    Neurologic:  Alert and oriented X4, Headache.    Psychiatric:  Negative.       Health Status   Allergies:    Allergic Reactions (Selected)  Severity Not Documented  Coumadin (No reactions were documented)   Problem list:    All Problems  Anxiety disorder / SNOMED CT 8ST85I42-179I-69A5-DO40-QOK97505S299 / Confirmed  Chronic fatigue disorder / SNOMED CT 15U55886-4ZV6-7761-CH19-S3JO57W68U82 / Confirmed  Irritable bowel syndrome (IBS) / SNOMED CT 7RBNB326-51S0-559Z-EC8O-TX985KLKTC6F / Confirmed  Arthralgia / SNOMED CT 96823778 / Confirmed  Stress incontinence in female / SNOMED CT 93481212-RZK9-057Q-P0BC-9088444421T9 / Confirmed  Tension headache / SNOMED CT 3353816989 / Confirmed  Resolved: *Hospitalized@[facility] - Arthralgia of multiple joints  Resolved: DVT - Deep vein thrombosis of lower limb / SNOMED CT 4160494181  Resolved: Infertility treatment / SNOMED CT 5674256237  Resolved: PCOD - Polycystic ovarian disease / SNOMED CT 597995334  Resolved: Pregnancy / SNOMED CT 825836187  Resolved: Pregnant / SNOMED CT 353898631  Resolved: Protein S deficiency / SNOMED CT 0856847113      Histories   Past Medical History:    Active  Stress  incontinence in female (23001298-QJQ3-587Y-B8OO-5173087265H8): Onset in 2013 at 33 years.  Arthralgia (84582123)  Anxiety disorder (6UN96A69-495D-02R7-AD26-CYF93959E411)  Irritable bowel syndrome (IBS) (0MELF762-51Q8-498K-XT1K-JI024QZWUS5I)  Chronic fatigue disorder (70H57066-0XQ9-2488-QU38-O0XP18O00M40)  Tension headache (4535065619)  Resolved  *Hospitalized@[facility] - Arthralgia of multiple joints: Onset on 6/21/2010 at 29 years.  Resolved.  Pregnancy (710698914): Onset on 7/29/2006 at 26 years.  Resolved on 4/7/2007 at 26 years.  Protein S deficiency (9933991133):  Resolved.  DVT - Deep vein thrombosis of lower limb (5966719180):  Resolved.  PCOD - Polycystic ovarian disease (924662893):  Resolved.  Infertility treatment (2186983092):  Resolved.   Family History:    Hearing aid  Daughter  Son  Diabetes mellitus  Mother  Systemic lupus erythematosus  Sister  Rheumatoid arthritis  Sister  Heart disease  Father  Thyroid disease  Mother     Procedure history:    Colonoscopy (45.23) on 1/31/2014 at 33 Years.  Comments:  2/13/2014 2:15 PM - Diana Mahmood  Normal.  Esophagogastroduodenoscopy (477497508) on 1/31/2014 at 33 Years.  Laparoscopy (078079987) on 6/22/2012 at 31 Years.  Appendectomy (419084410).  Hernia repair (02646556).  knee surgery x3.  Intrauterine insemination (701349407).   Social History:        Alcohol Assessment            1 drink about once per week, 1 drinks/episode average.  Alcohol use interferes with work or home: No.  Drinks               more than intended: No.      Tobacco Assessment            Never      Substance Abuse Assessment            Never      Employment and Education Assessment            Unemployed, Work/School description: homemaker.      Home and Environment Assessment            Marital status: .  Lives with Children, Spouse.            Marital status: .  Spouse/Partner name: Amrik Bahena.  Lives with Self, Children, Spouse.  2 children.               Risks in  environment: Unlocked guns.      Nutrition and Health Assessment            Type of diet: Regular, Gluten free, dairy free.  Wants to lose weight: Yes.  Sleeping concerns: Yes.  Feels               highly stressed: Yes.      Exercise and Physical Activity Assessment            Exercise frequency: 3-4 times/week.  Exercise type: Cardio.      Sexual Assessment            Sexually active: Yes.  Sexual orientation: Heterosexual.        Physical Examination   VS/Measurements   General:  Alert and oriented, No acute distress.    Eye:  Pupils are equal, round and reactive to light.    HENT:  Normocephalic.    Respiratory:  Respirations are non-labored.    Cardiovascular:  Normal rate, Regular rhythm.    Neurologic:  Alert, Oriented, Normal sensory, Normal motor function, No focal deficits.    Psychiatric:  Cooperative, Appropriate mood & affect.       Impression and Plan   Diagnosis     Tension headache (ZCD97-KB G44.209).     Chronic myofascial pain (GIB94-HF M79.1).     Plan   Orders     Orders (Selected)   Prescriptions  Prescribed  DULoxetine 30 mg oral delayed release capsule: See Instructions, Instructions: 1 cap(s) po daily for 30 days, then increase to 2 caps daily, # 60 cap(s), 5 Refill(s), Type: Maintenance, Pharmacy: [facility] [id number], 1 cap(s) po daily for 30 days, then increase to 2 caps daily  loperamide 2 mg oral capsule: 1 cap(s) ( 2 mg ), PO, q4hr, PRN: for loose stools, # 60 cap(s), 0 Refill(s), Type: Maintenance, Pharmacy: [facility] [id number], 1 cap(s) po q4 hrs,PRN:for loose stools.       .) chronic, tension headache   - initially with very favorable response to amitriptyline, reaching dose of 30mg daily - no adverse effects    - poor tolerance of 50mg qhs dosing   - suspect more myofascial pains/fibromyalgia    - will trial Cymbalta 30mg daily for 1 month, then increase to 60mg daily as tolerates - potential mood disorder component?    RTC in 3 months

## 2022-02-15 NOTE — NURSING NOTE
Depression Screening Entered On:  9/10/2019 8:59 AM CDT    Performed On:  8/14/2019 8:59 AM CDT by Thalia Gomez CMA               Depression Screening   Little Interest - Pleasure in Activities :   Not at all   Feeling Down, Depressed, Hopeless :   Not at all   Initial Depression Screen Score :   0    Trouble Falling or Staying Asleep :   Several days   Feeling Tired or Little Energy :   More than half the days   Poor Appetite or Overeating :   Several days   Feeling Bad About Yourself :   Not at all   Trouble Concentrating :   Not at all   Moving or Speaking Slowly :   Not at all   Thoughts Better Off Dead or Hurting Self :   Not at all   Detailed Depression Screen Score :   4    Total Depression Screen Score :   4    RUKHSANA Difficulty with Work, Home, Others :   Somewhat difficult   Thalia Gomez CMA - 9/10/2019 8:59 AM CDT

## 2022-02-15 NOTE — TELEPHONE ENCOUNTER
---------------------  From: Tess Alves CMA (Phone Messages Pool ([id number]_[place] [facility]))   To: Appointment Pool ([id number]_[place]);     Sent: 10/7/2021 8:05:07 AM CDT  Subject: FW: evening appointment           ---------------------  From: LAZARA BAHENA  To: [facility]  Sent: 10/06/2021 07:11 p.m. CDT  Subject: evening appointment  Hello,  I am writing on behalf of patient Jessi Bahena (:[birth year]). We are desperately seeking a late afternoon/evening appointment with Jemma Dawn. We found out on Tuesday at Jessi s endocrinologist that she has lost 17lbs over the last 3 months. This is very concerning. The endocrinologist is re-doing labs but has asked us to see pediatrician immediately to follow up on other possible causes. She is not herself and we know something is off. Due to my work schedule I am having a hard time getting time off except in late afternoon/evening. I am hoping you can help.    Thanks  Lazara JosefPATIENT SCHEDULED FOR 10.14.21 WITH ARM OK TO USE SAME DAY PER MAUREEN

## 2022-02-15 NOTE — NURSING NOTE
Comprehensive Intake Entered On:  7/28/2019 2:16 PM CDT    Performed On:  7/28/2019 2:10 PM CDT by Briana Somers CMA               Summary   Chief Complaint :   patient here today with shortness of breath and chest tightness. Had botox injections close to lungs on 7/16.    Menstrual Status :   Menarcheal   Weight Measured :   12.4 lb(Converted to: 12 lb 6 oz, 5.62 kg)    Systolic Blood Pressure :   100 mmHg   Diastolic Blood Pressure :   58 mmHg (LOW)    Mean Arterial Pressure :   72 mmHg   Peripheral Pulse Rate :   82 bpm   BP Site :   Right arm   BP Method :   Manual   HR Method :   Electronic   Temperature Tympanic :   98.0 DegF(Converted to: 36.7 DegC)    Respiratory Rate :   12 br/min (LOW)    Oxygen Saturation :   96 %   Race :      Languages :   English   Ethnicity :   Not  or    Briana Somers CMA - 7/28/2019 2:10 PM CDT   Health Status   Allergies Verified? :   Yes   Medication History Verified? :   Yes   Pre-Visit Planning Status :   N/A   Tobacco Use? :   Never smoker   Briana Somers CMA - 7/28/2019 2:10 PM CDT   Consents   Consent for Immunization Exchange :   Consent Granted   Consent for Immunizations to Providers :   Consent Granted   Briana Somers CMA - 7/28/2019 2:10 PM CDT   Meds / Allergies   (As Of: 7/28/2019 2:16:52 PM CDT)   Allergies (Active)   Coumadin  Estimated Onset Date:   Unspecified ; Created By:   Rosario Dela Cruz; Reaction Status:   Active ; Category:   Drug ; Substance:   Coumadin ; Type:   Allergy ; Updated By:   Rosario Dela Cruz; Source:   Paper Chart ; Reviewed Date:   7/3/2019 8:32 AM CDT      Cymbalta  Estimated Onset Date:   Unspecified ; Reactions:   groggy ; Created By:   Diana Ayala CMA; Reaction Status:   Active ; Category:   Drug ; Substance:   Cymbalta ; Type:   Allergy ; Updated By:   Diana Ayala CMA; Reviewed Date:   7/3/2019 8:32 AM CDT        Medication List   (As Of: 7/28/2019 2:16:52 PM CDT)   Prescription/Discharge Order     atropine-diphenoxylate  :   atropine-diphenoxylate ; Status:   Prescribed ; Ordered As Mnemonic:   Lomotil 2.5 mg-0.025 mg oral tablet ; Simple Display Line:   1 tab(s), po, q6 hrs, PRN: as needed for loose stool, 100 tab(s), 1 Refill(s) ; Ordering Provider:   Samuel Dawn MD; Catalog Code:   atropine-diphenoxylate ; Order Dt/Tm:   1/17/2019 6:30:46 PM          imipramine  :   imipramine ; Status:   Prescribed ; Ordered As Mnemonic:   imipramine 10 mg oral tablet ; Simple Display Line:   See Instructions, 1 tab(s) Oral qhs for 3 weeks, then increase to 2 tabs qhs, 60 tab(s), 5 Refill(s) ; Ordering Provider:   Samuel Dawn MD; Catalog Code:   imipramine ; Order Dt/Tm:   1/17/2019 9:11:29 PM            Home Meds    calcium carbonate  :   calcium carbonate ; Status:   Documented ; Ordered As Mnemonic:   Tums ; Simple Display Line:   500 mg, Chewed, daily, 0 Refill(s) ; Catalog Code:   calcium carbonate ; Order Dt/Tm:   1/17/2019 5:49:19 PM          loperamide  :   loperamide ; Status:   Documented ; Ordered As Mnemonic:   Imodium A-D ; Simple Display Line:   2 mg, Oral, q4 hrs, 0 Refill(s) ; Catalog Code:   loperamide ; Order Dt/Tm:   1/17/2019 5:49:15 PM

## 2022-02-15 NOTE — PROGRESS NOTES
Patient:   KENYA HERNANDEZ            MRN: [id number]            FIN: [id number]               Age:   40 years     Sex:  Female     :  1980   Associated Diagnoses:   None   Author:   Sahra Rodriguez MA       -   Today's date:    3/7/2021.        -   To whom it may concern:        This patient Was seen in my office on  3/4/2021.  .   She had a COVID test done at that time.  Her results came back negative.       -   Sincerely,   Sarahi Daniel NP    [facility]    [phone]

## 2022-02-15 NOTE — TELEPHONE ENCOUNTER
---------------------  From: Barbi Leung   To: Samuel Dawn MD;     Sent: 12/24/2019 9:00:21 AM CST  Subject: Scheduling Management     Cancelled appointment for hand and feet swelling and did not reschedule.

## 2022-02-20 ENCOUNTER — OFFICE VISIT - RIVER FALLS (OUTPATIENT)
Dept: FAMILY MEDICINE | Facility: CLINIC | Age: 42
End: 2022-02-20
Payer: COMMERCIAL

## 2022-03-02 VITALS
SYSTOLIC BLOOD PRESSURE: 92 MMHG | HEART RATE: 84 BPM | DIASTOLIC BLOOD PRESSURE: 65 MMHG | BODY MASS INDEX: 23.19 KG/M2 | WEIGHT: 133 LBS

## 2022-03-02 NOTE — PROGRESS NOTES
Chief Complaint    Migraine Headaches, since weds, sees Sharonda Jean for botox injections, and is on valproic acid.  Last appt was late and then done by a different provider and in different locations.  Is normally on name brand valproic acid but is on the generic right now.  History of Present Illness       Patient is a 41-year-old female who comes in with migraine headache for 4 days.       She has a 4-year history of ongoing migraine headaches.  She is currently followed by [facility] in [place].       In the last 3 weeks she had 2 disruptions in her usual preventive care for her migraines.       She typically gets Botox every 12 weeks and takes brand-name valproic acid for prevention of migraines.       Her neurological clinic had to reschedule her Botox injection because of Covid infections at the clinic.  Then when she did get her Botox injections it was from a different provider and she felt that the sites of injection were different than her typical.       Then her pharmacy was unable to get brand-name valproic acid and they gave her generic.       For last 4 days she has had her typical migraine symptoms which include tightness in her upper chest and her anterior neck muscles are very tight.  Decreased range of motion of the neck.  Headache.       No new or worrisome symptoms to her migraine headache.       She also shares that she has had chronic GI issues starting from 4 years ago.  She has been worked up for this extensively including an upper and lower GI.  She takes a lot of Imodium and has tried dietary restrictions like a FODMAP diet in the past.  Current diagnosis is IBS with constipation.  She is not currently, today, having any change in her bowel symptoms.  Review of Systems       Negative except as listed in HPI  Physical Exam   Vitals & Measurements    HR: 84 (Peripheral)  BP: 92/65     WT: 133 lb        Vitals noted and within normal limits       In general she is alert,  oriented, and in no acute distress       She has good attention and speaks clearly and fluently.  Her eyes have a conjugate gaze and conjunctive are not injected.       She is tightness in her sternocleidomastoids bilaterally as well as upper chest muscles anteriorly  Assessment/Plan       Migraine headache (G43.909)          We will treat with a Medrol Dosepak and tizanidine, both medications have worked well for her in the past.  She has not had to take them for a couple of years since she has been on a good preventive regimen.         For her GI issues I invited her to follow-up with me in a functional medicine consultation to try to get to the root cause of her GI issues and possibly migraines.         Ordered:          methylPREDNISolone, = 1 packet(s), Oral, once, Instructions: as directed on package labeling, # 21 tab(s), 0 Refill(s), Type: Soft Stop, Pharmacy: [facility] #[id number], 1 packet(s) Oral once,Instr:as directed on package labeling, 133, lb, 02/20/22 8:19:00 CST, Sourav..., (Ordered)          tiZANidine, = 1 tab(s) ( 4 mg ), Oral, q8 hrs, Instructions: no driving, # 21 tab(s), 0 Refill(s), Type: Maintenance, Pharmacy: [facility] #[id number], 1 tab(s) Oral q8 hrs,Instr:no driving, 133, lb, 02/20/22 8:19:00 CST, Weight Measured, (Ordered)           Patient Information     Name:KENYA HERNANDEZ      Address:      [address] [place] [id number]     Sex:Female     YOB: 1980     [phone]     Emergency Contact:GAVIOTA HERNANDEZ     MRN:[id number]     FIN:[id number]     Location:[facility]     Date of Service:02/20/2022      Primary Care Physician:       Samuel Dawn MD, [phone]      Attending Physician:       Leena Guevara MD, [phone]  Problem List/Past Medical History    Ongoing     Anxiety disorder     Arthralgia     Chronic fatigue disorder     Deficiency, protein S     H/O migraine     Irritable bowel syndrome (IBS)     Stress incontinence in  female     Tension headache    Historical     *Hospitalized@[facility] - Arthralgia of multiple joints     DVT - Deep vein thrombosis of lower limb     Infertility treatment     PCOD - Polycystic ovarian disease     Pregnancy     Protein S deficiency  Procedure/Surgical History     Colonoscopy (01/31/2014)      Comments: Normal..     Esophagogastroduodenoscopy (01/31/2014)     Laparoscopy (06/22/2012)     Appendectomy     Hernia repair     Intrauterine insemination     knee surgery x3  Medications    cholestyramine 4 g/5 g oral powder for reconstitution, See Instructions, 1 refills    Imodium A-D, 2 mg, Oral    Medrol Dosepak 4 mg oral tablet, 1 packet(s), Oral, once    tiZANidine 4 mg oral tablet, 4 mg= 1 tab(s), Oral, q8 hrs    valproic acid 250 mg oral capsule  Allergies    Coumadin    Cymbalta (groggy)  Social History    Smoking Status     Never smoker     Alcohol - Current      1 drink about once per week, Wine (5 oz), 1-2 times per month, 1 drinks/episode average. Alcohol use interferes with work or home: No. Drinks more than intended: No. Ready to change: No.     Electronic Cigarette/Vaping      Electronic Cigarette Use: Never.     Employment/School      Work/School description: . Highest education level: College.     Exercise      Exercise frequency: 1-2 times/week.     Home/Environment      Marital status: . Lives with Children, Spouse. Living situation: Home/Independent. Injuries/Abuse/Neglect in household: No. Feels unsafe at home: No. Family/Friends available for support: Yes.     Nutrition/Health      Type of diet: Regular, Gluten free, dairy free. Wants to lose weight: Yes. Sleeping concerns: Yes. Feels highly stressed: Yes.     Other      Irregular menses. Painful periods.     Sexual      Identifies as female, History of STD: No. Uses condoms: Yes. History of sexual abuse: No.     Substance Abuse - Denies Substance Abuse      Never     Tobacco      Never (less than 100 in  lifetime)  Family History    Diabetes mellitus: Mother.    Hearing aid: Daughter and Son.    Heart disease: Father.    Rheumatoid arthritis: Sister.    Systemic lupus erythematosus: Sister.    Thyroid disease: Mother.  Immunizations       Scheduled Immunizations       Dose Date(s)       SARS-CoV-2 (COVID-19) Pfizer-162b2       07/30/2021, 08/20/2021       Td       07/09/2009       Other Immunizations               Hep A       11/17/2008       typhoid, inactivated       11/17/2008

## 2022-03-02 NOTE — NURSING NOTE
Comprehensive Intake Entered On:  2/20/2022 8:30 AM CST    Performed On:  2/20/2022 8:19 AM CST by Kirby Steinberg LPN               Summary   Chief Complaint :   Migraine Headaches, since weds, sees Sharonda Jean for botox injections, and is on valproic acid.  Last appt was late and then done by a different provider and in different locations.  Is normally on name brand valproic acid but is on the generic right now.   Menstrual Status :   Menarcheal   Additonal Information :   Pt has done lots of different stuff to remedy it without success.  Too soon for another botox injection.   Weight Measured :   133 lb(Converted to: 133 lb 0 oz, 60.328 kg)    Systolic Blood Pressure :   92 mmHg   Diastolic Blood Pressure :   65 mmHg   Mean Arterial Pressure :   74 mmHg   Peripheral Pulse Rate :   84 bpm   BP Site :   Right arm   Pulse Site :   Radial artery   BP Method :   Manual   HR Method :   Manual   Race :      Languages :   English   Ethnicity :   Not  or    Kirby Steinberg LPN - 2/20/2022 8:19 AM CST   Consents   Consent for Immunization Exchange :   Consent Granted   Consent for Immunizations to Providers :   Consent Granted   Kirby Steinberg LPN - 2/20/2022 8:19 AM CST   Meds / Allergies   (As Of: 2/20/2022 8:30:03 AM CST)   Allergies (Active)   Coumadin  Estimated Onset Date:   Unspecified ; Created By:   Rosario Dela Cruz; Reaction Status:   Active ; Category:   Drug ; Substance:   Coumadin ; Type:   Allergy ; Updated By:   Rosario Dela Cruz; Source:   Paper Chart ; Reviewed Date:   2/20/2022 8:19 AM CST      Cymbalta  Estimated Onset Date:   Unspecified ; Reactions:   groggy ; Created By:   Diana Ayala CMA; Reaction Status:   Active ; Category:   Drug ; Substance:   Cymbalta ; Type:   Allergy ; Updated By:   Diana Ayala CMA; Reviewed Date:   2/20/2022 8:19 AM CST        Medication List   (As Of: 2/20/2022 8:30:03 AM CST)   Prescription/Discharge Order    cholestyramine  :   cholestyramine  ; Status:   Prescribed ; Ordered As Mnemonic:   cholestyramine 4 g/5 g oral powder for reconstitution ; Simple Display Line:   See Instructions, 4 gm Oral daily x 1 week, increase to goal of 4gm TID, 180 EA, 1 Refill(s) ; Ordering Provider:   Samuel Dawn MD; Catalog Code:   cholestyramine ; Order Dt/Tm:   7/21/2021 7:28:44 AM CDT            Home Meds    loperamide  :   loperamide ; Status:   Documented ; Ordered As Mnemonic:   Imodium A-D ; Simple Display Line:   2 mg, Oral, 0 Refill(s) ; Catalog Code:   loperamide ; Order Dt/Tm:   7/21/2021 7:03:44 AM CDT          valproic acid  :   valproic acid ; Status:   Documented ; Ordered As Mnemonic:   valproic acid 250 mg oral capsule ; Simple Display Line:   0 Refill(s) ; Catalog Code:   valproic acid ; Order Dt/Tm:   7/21/2021 7:02:48 AM CDT ; Comment:   Responsible Provider: CARLYN ARMSTRONG            Social History   Social History   (As Of: 2/20/2022 8:30:03 AM CST)   Alcohol:  Current      1 drink about once per week, Wine (5 oz), 1-2 times per month, 1 drinks/episode average.  Alcohol use interferes with work or home: No.  Drinks more than intended: No.  Ready to change: No.   (Last Updated: 9/12/2019 1:09:06 PM CDT by Diana Mahmood)          Tobacco:        Never (less than 100 in lifetime)   (Last Updated: 3/4/2021 9:42:12 AM CST by Libra Mohr LPN)          Electronic Cigarette/Vaping:        Electronic Cigarette Use: Never.   (Last Updated: 3/4/2021 9:42:18 AM CST by Libra Mohr LPN)          Substance Abuse:  Denies Substance Abuse      Never   (Last Updated: 5/29/2012 3:19:24 PM CDT by Avani Nation)          Employment/School:        Work/School description: .  Highest education level: College.   (Last Updated: 9/12/2019 1:10:29 PM CDT by Diana Mahmood)          Home/Environment:        Marital status: .  Spouse/Partner name: Amrik Bahena.  Lives with Self, Children, Spouse.  2 children.  Risks in environment:  Unlocked guns.   (Last Updated: 9/13/2017 7:38:27 PM CDT by Raquel Santos)   Marital status: .  Lives with Children, Spouse.  Living situation: Home/Independent.  Injuries/Abuse/Neglect in household: No.  Feels unsafe at home: No.  Family/Friends available for support: Yes.   Comments:  9/13/2017 7:39 PM - Raquel Santos:  works for [employer]. 8/25/2010 7:23 PM - Ned Cary MD:  works for [employer]   (Last Updated: 9/12/2019 1:09:58 PM CDT by Diana Mahmood)          Nutrition/Health:        Type of diet: Regular, Gluten free, dairy free.  Wants to lose weight: Yes.  Sleeping concerns: Yes.  Feels highly stressed: Yes.   (Last Updated: 9/13/2017 7:42:10 PM CDT by Raquel Santos)          Exercise:        Exercise frequency: 3-4 times/week.  Exercise type: Cardio.   (Last Updated: 5/29/2012 3:20:01 PM CDT by Avani Nation)   Exercise frequency: 1-2 times/week.   (Last Updated: 9/12/2019 1:14:07 PM CDT by Diana Mahmood)          Sexual:        Sexually active: Yes.  Sexual orientation: Heterosexual.   (Last Updated: 8/5/2015 2:50:20 PM CDT by Jaqui Beckwith MA)   Identifies as female, History of STD: No.  Uses condoms: Yes.  History of sexual abuse: No.   (Last Updated: 9/12/2019 1:13:06 PM CDT by Diana Mahmood)          Other:        Irregular menses.  Painful periods.   (Last Updated: 9/12/2019 1:17:15 PM CDT by Diana Mahmood)

## 2022-07-11 ENCOUNTER — TELEPHONE (OUTPATIENT)
Dept: FAMILY MEDICINE | Facility: CLINIC | Age: 42
End: 2022-07-11

## 2022-07-11 NOTE — TELEPHONE ENCOUNTER
Reason for Call: Request for an order or referral:    Order or referral being requested: Mammogram    Date needed: as soon as possible    Has the patient been seen by the PCP for this problem? YES    Additional comments: Please send mammogram order to [facility]    Phone number Patient can be reached at:  Cell number on file:    Telephone Information:   Mobile [phone]       Best Time:  anytime    Can we leave a detailed message on this number?  YES    Call taken on 7/11/2022 at 3:12 PM by iMchelle Donato

## 2022-07-12 NOTE — TELEPHONE ENCOUNTER
Contacted pt at 1120 - advised screening orders not needed, verified screening only.  Will call [facility] to set up mammo

## 2022-12-26 ENCOUNTER — HEALTH MAINTENANCE LETTER (OUTPATIENT)
Age: 42
End: 2022-12-26

## 2023-04-16 ENCOUNTER — HEALTH MAINTENANCE LETTER (OUTPATIENT)
Age: 43
End: 2023-04-16

## 2023-04-17 ENCOUNTER — TRANSFERRED RECORDS (OUTPATIENT)
Dept: HEALTH INFORMATION MANAGEMENT | Facility: CLINIC | Age: 43
End: 2023-04-17
Payer: COMMERCIAL

## 2023-08-14 ENCOUNTER — LAB REQUISITION (OUTPATIENT)
Dept: LAB | Facility: CLINIC | Age: 43
End: 2023-08-14

## 2023-08-14 DIAGNOSIS — Z12.4 ENCOUNTER FOR SCREENING FOR MALIGNANT NEOPLASM OF CERVIX: ICD-10-CM

## 2023-08-14 PROCEDURE — 87624 HPV HI-RISK TYP POOLED RSLT: CPT | Performed by: OBSTETRICS & GYNECOLOGY

## 2023-08-14 PROCEDURE — G0145 SCR C/V CYTO,THINLAYER,RESCR: HCPCS | Performed by: OBSTETRICS & GYNECOLOGY

## 2023-08-17 LAB
BKR LAB AP GYN ADEQUACY: NORMAL
BKR LAB AP GYN INTERPRETATION: NORMAL
BKR LAB AP HPV REFLEX: NORMAL
BKR LAB AP LMP: NORMAL
BKR LAB AP PREVIOUS ABNL DX: NORMAL
BKR LAB AP PREVIOUS ABNORMAL: NORMAL
PATH REPORT.COMMENTS IMP SPEC: NORMAL
PATH REPORT.COMMENTS IMP SPEC: NORMAL
PATH REPORT.RELEVANT HX SPEC: NORMAL

## 2023-08-21 LAB
HUMAN PAPILLOMA VIRUS 16 DNA: NEGATIVE
HUMAN PAPILLOMA VIRUS 18 DNA: NEGATIVE
HUMAN PAPILLOMA VIRUS FINAL DIAGNOSIS: NORMAL
HUMAN PAPILLOMA VIRUS OTHER HR: NEGATIVE

## 2023-11-15 ENCOUNTER — OFFICE VISIT (OUTPATIENT)
Dept: FAMILY MEDICINE | Facility: CLINIC | Age: 43
End: 2023-11-15
Payer: COMMERCIAL

## 2023-11-15 VITALS
BODY MASS INDEX: 21.94 KG/M2 | HEART RATE: 78 BPM | SYSTOLIC BLOOD PRESSURE: 103 MMHG | HEIGHT: 64 IN | TEMPERATURE: 98.1 F | DIASTOLIC BLOOD PRESSURE: 69 MMHG | WEIGHT: 128.5 LBS | RESPIRATION RATE: 16 BRPM | OXYGEN SATURATION: 98 %

## 2023-11-15 DIAGNOSIS — E04.9 GOITER: Primary | ICD-10-CM

## 2023-11-15 LAB — TSH SERPL DL<=0.005 MIU/L-ACNC: 1.34 UIU/ML (ref 0.3–4.2)

## 2023-11-15 PROCEDURE — 36415 COLL VENOUS BLD VENIPUNCTURE: CPT

## 2023-11-15 PROCEDURE — 99213 OFFICE O/P EST LOW 20 MIN: CPT

## 2023-11-15 PROCEDURE — 84443 ASSAY THYROID STIM HORMONE: CPT

## 2023-11-15 RX ORDER — VALPROIC ACID 250 MG/1
250 CAPSULE, LIQUID FILLED ORAL DAILY
COMMUNITY

## 2023-11-15 RX ORDER — ELAGOLIX 200 MG/1
1 TABLET, FILM COATED ORAL
COMMUNITY
Start: 2023-10-04

## 2023-11-15 ASSESSMENT — PATIENT HEALTH QUESTIONNAIRE - PHQ9
SUM OF ALL RESPONSES TO PHQ QUESTIONS 1-9: 2
10. IF YOU CHECKED OFF ANY PROBLEMS, HOW DIFFICULT HAVE THESE PROBLEMS MADE IT FOR YOU TO DO YOUR WORK, TAKE CARE OF THINGS AT HOME, OR GET ALONG WITH OTHER PEOPLE: NOT DIFFICULT AT ALL
SUM OF ALL RESPONSES TO PHQ QUESTIONS 1-9: 2

## 2023-11-15 ASSESSMENT — ENCOUNTER SYMPTOMS: FATIGUE: 1

## 2023-11-15 NOTE — PROGRESS NOTES
"  Assessment & Plan     Goiter  Patient with palpable thyromegaly and goiter.  She has been noticing this and has been noticed by other people in past 1.5 months.  She is also experiencing fatigue.  No intolerance to heat or cold, no increase in dry patches of skin or hair loss.  Patient has strong family history of hypothyroidism.  Ultrasound of thyroid and TSH with free T4 today.  To be notified of results and plan of care adjusted as needed.  - TSH with free T4 reflex; Future  - US Thyroid; Future  - TSH with free T4 reflex                 WAYNE Burleson CNP  [facility]    Sherrill Haile is a 43 year old, presenting for the following health issues:  Fatigue (Tyroid Concerns )        11/15/2023     2:53 PM   Additional Questions   Roomed by LISANDRA Garcia       Has been having fatigue for approx 1.5 months    History of Present Illness       Reason for visit:  Fatigue neck swelling  Symptom onset:  3-4 weeks ago  Symptom intensity:  Moderate  Symptom progression:  Staying the same  Had these symptoms before:  No  What makes it worse:  No  What makes it better:  No    She eats 2-3 servings of fruits and vegetables daily.She consumes 1 sweetened beverage(s) daily.She exercises with enough effort to increase her heart rate 30 to 60 minutes per day.  She exercises with enough effort to increase her heart rate 4 days per week.   She is taking medications regularly.                 Review of Systems   Constitutional:  Positive for fatigue.            Objective    /69 (BP Location: Right arm, Patient Position: Sitting, Cuff Size: Adult Regular)   Pulse 78   Temp 98.1  F (36.7  C) (Oral)   Resp 16   Ht 1.613 m (5' 3.5\")   Wt 58.3 kg (128 lb 8 oz)   LMP  (LMP Unknown)   SpO2 98%   BMI 22.41 kg/m    Body mass index is 22.41 kg/m .  Physical Exam  HENT:      Head: Normocephalic.      Nose: Nose normal.   Eyes:      Extraocular Movements: Extraocular movements intact.      " Conjunctiva/sclera: Conjunctivae normal.   Neck:      Thyroid: Thyromegaly present. No thyroid mass or thyroid tenderness.   Pulmonary:      Effort: Pulmonary effort is normal.   Abdominal:      General: Bowel sounds are normal.      Palpations: Abdomen is soft.   Musculoskeletal:         General: Normal range of motion.      Cervical back: Normal range of motion. No rigidity.   Lymphadenopathy:      Cervical: No cervical adenopathy.   Skin:     General: Skin is warm and dry.      Capillary Refill: Capillary refill takes less than 2 seconds.   Neurological:      Mental Status: She is alert and oriented to person, place, and time.

## 2023-12-01 DIAGNOSIS — E04.9 GOITER: Primary | ICD-10-CM

## 2024-01-23 NOTE — CONFIDENTIAL NOTE
RECORDS RECEIVED FROM: internal /ce   DATE RECEIVED: 2.12.24    NOTES (FOR ALL VISITS) STATUS DETAILS   OFFICE NOTES from referring provider internal  Khalida Worrell APRN CNP    OFFICE NOTES from other specialist internal  11.15.23-Gm   MEDICATION LIST internal     IMAGING      ULTRASOUND (HEAD/NECK) CE [facility]- Us parathyroid- 11.29.23    LABS     DIABETES: HBGA1C, CREATININE, FASTING LIPIDS, MICROALBUMIN URINE, POTASSIUM, TSH, T4    THYROID: TSH, T4, CBC, THYRODLONULIN, TOTAL T3, FREE T4, CALCITONIN, CEA internal  TSH- 11.15.23

## 2024-02-12 ENCOUNTER — PRE VISIT (OUTPATIENT)
Dept: ENDOCRINOLOGY | Facility: CLINIC | Age: 44
End: 2024-02-12

## 2024-02-12 ENCOUNTER — VIRTUAL VISIT (OUTPATIENT)
Dept: ENDOCRINOLOGY | Facility: CLINIC | Age: 44
End: 2024-02-12
Payer: COMMERCIAL

## 2024-02-12 DIAGNOSIS — E04.9 GOITER: ICD-10-CM

## 2024-02-12 PROCEDURE — 99205 OFFICE O/P NEW HI 60 MIN: CPT | Mod: 95 | Performed by: STUDENT IN AN ORGANIZED HEALTH CARE EDUCATION/TRAINING PROGRAM

## 2024-02-12 NOTE — LETTER
"2/12/2024       RE: Lazara Bahena  [address]  [place] [address]     Dear Colleague,    Thank you for referring your patient, Lazara Bahena, to the [facility] ENDOCRINOLOG[facility] [place] at [facility]. Please see a copy of my visit note below.    Virtual Visit Details    Type of service:  Video Visit     Originating Location (pt. Location): Home    Distant Location (provider location):  On-site  Platform used for Video Visit: [facility]    Endocrinology Clinic Visit 2/12/2024      Video-Visit Details    Type of service:  Video Visit    Joined the call at 2/12/2024, 8:08:10 am.  Left the call at 2/12/2024, 8:31:35 am.    Originating Location (pt. Location): Other parked car        Distant Location (provider location):  Off-site    Mode of Communication:  Video Conference via [employer]    Physician has received verbal consent for a Video Visit from the patient? Yes    I spent a total of 60 minutes on the date of encounter reviewing medical records, evaluating the patient, coordinating care and documenting in the EHR, as detailed above.      NAME:  Lazara Bahena  PCP:  Artemio Dawn  MRN:  [id number]  Reason for Consult:  goiter  Requesting Provider:  Khalida Worrell    Chief Complaint     Chief Complaint   Patient presents with    Consult       History of Present Illness     Lazara Bahena is a 43 year old female who is seen in video visit for thyroid goiter.    She reported fatigue and hair loss since last October 2013.  She felt a lump in her throat when swallowing, no pain or discomfort, people commented that her neck is big. It feels like there is always something in her throat, it doesn't feel smooth. No difficulty swallowing but \"feels more aware of swallowing\". No SOB when lying down.    She was seen by her PCP, tsh ON 11/15/2023 wnl at 1.34.  Thyroid US was done at [facility] on 11/2023, no images to review. Per their report:    RIGHT " lobe: 4.6 x 1.8 x 1.3 cm. Homogeneous echotexture.   Isthmus: 2 mm.   LEFT lobe: 5 x 1.8 x 2.0 cm. Homogeneous echotexture.     NECK: No cervical lymphadenopathy.     NODULES:     Nodule 1: 0.4 x 0.3 x 0.4 cm nodule in the right mid gland.   Composition: Mixed cystic and solid, 1 point   Echogenicity: Hyperechoic or isoechoic, 1 point   Shape: Wider-than-tall, 0 points   Margin: Smooth, 0 points   Echogenic Foci: None, or large comet-tail artifacts, 0 points   Point Total: 1-2 points. TI-RADS 2. No FNA.       Nodule 2: Left upper pole nodule measures 1.7 x 1.2 x 1.6 cm.   Composition: Solid or almost completely solid, 2 points   Echogenicity: Hyperechoic or isoechoic, 1 point   Shape: Wider-than-tall, 0 points   Margin: Smooth, 0 points   Echogenic Foci: None, or large comet-tail artifacts, 0 points   Point Total: 3 points. TI-RADS 3. If 2.5 cm or larger, recommend FNA; if 1.5 cm or larger, recommend follow up US at 1, 3, and 5 years.     Nodule 3: There are 2 spongiform nodules in the left upper and mid gland measuring up to 0.5 cm.   Composition: Spongiform, 0 points   Point Total: 0 points. TI-RADS 1. No FNA.     Family hx: grandma had hashimotos, mother graves, daughter who is 16 was just diagnosed with graves disease. Multiple family members has goiter.    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    Problem List     Patient Active Problem List   Diagnosis    Female infertility associated with anovulation    DEPRESSION ACUTE - SINGLE EPISODE( Moderate)    Insomnia    Embolism and thrombosis (H)    Chronic tension headache    CARDIOVASCULAR SCREENING; LDL GOAL LESS THAN 160        Medications     Current Outpatient Medications   Medication    ADVIL 200 MG OR TABS    follitropin beta (FOLLISTIM AQ) 900 UNT/1.08ML cartridge    ORILISSA 200 MG TABS    PRENATAL VITAMINS PO    TYLENOL PM EXTRA STRENGTH 500-25 MG OR TABS    valproic acid (DEPAKENE) 250 MG capsule     No current facility-administered medications for this visit.         Allergies     Allergies   Allergen Reactions    Coumadin [Warfarin And Related]      At high doses had hair loss    Duloxetine Hcl Fatigue       Medical / Surgical History     Past Medical History:   Diagnosis Date    Chronic tension headache     Depressive disorder, not elsewhere classified 12/05-8/06    started on Zoloft/marques kenia on hennepin    Embolism and thrombosis of unspecified site     postop from knee surgery, neg clotting workup X2    Other blood disease     Protein S deficiency    Pregnancy with history of infertility     spontaneous conception     Past Surgical History:   Procedure Laterality Date    HC HYSTEROSALPINGOGRAM  1/19/06    tubes open    HC KNEE SCOPE, DIAGNOSTIC  16 y/o    Arthroscopy, Knee    [facility] NONSPECIFIC PROCEDURE  1986    appy    [facility] NONSPECIFIC PROCEDURE      knee surgery X 2    [facility] NONSPECIFIC PROCEDURE  1981    right hernia repair       Social History     Social History     Socioeconomic History    Marital status:      Spouse name:     Number of children: 1    Years of education: 16    Highest education level: Not on file   Occupational History    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     Employer: NONE    Tobacco Use    Smoking status: Never     Passive exposure: Never    Smokeless tobacco: Not on file   Vaping Use    Vaping Use: Never used   Substance and Sexual Activity    Alcohol use: Yes     Comment: 1 x per month    Drug use: No    Sexual activity: Yes     Partners: Male   Other Topics Concern    Not on file   Social History Narrative    Balanced Diet - Yes    Osteoporosis Prevention Measures - Dairy servings per day: 1 and Weight bearing exercise    Regular Exercise -  Yes Describe Exercises 3 days per week    Dental Exam - YES - Date: 6 months ago    Eye Exam - about 2 years ago    Self Breast Exam - yes    Abuse: Current or Past (Physical, Sexual or Emotional)- No    Do you feel safe in your environment - Yes    Guns stored in the home - No    Sunscreen used -  yes 40 spf daily    Seatbelts used - Yes    Lipids -  NO    Glucose -  Yes, when she was pregnant 01/30/07    Hemoccults - NA    Pap Test -  Yes- 09/15/06    Do you have any concerns about STD's -  No    Mammography - NA    DEXA - NA    Immunizations reviewed and up to date - last td was possibly given in 2004, would have to check at home    Savannah Johnson ma  2007             Social Determinants of Health     Financial Resource Strain: Low Risk  (11/15/2023)    Financial Resource Strain     Within the past 12 months, have you or your family members you live with been unable to get utilities (heat, electricity) when it was really needed?: No   Food Insecurity: Low Risk  (11/15/2023)    Food Insecurity     Within the past 12 months, did you worry that your food would run out before you got money to buy more?: No     Within the past 12 months, did the food you bought just not last and you didn t have money to get more?: No   Transportation Needs: Low Risk  (11/15/2023)    Transportation Needs     Within the past 12 months, has lack of transportation kept you from medical appointments, getting your medicines, non-medical meetings or appointments, work, or from getting things that you need?: No   Physical Activity: Not on file   Stress: Not on file   Social Connections: Not on file   Interpersonal Safety: Low Risk  (11/15/2023)    Interpersonal Safety     Do you feel physically and emotionally safe where you currently live?: Yes     Within the past 12 months, have you been hit, slapped, kicked or otherwise physically hurt by someone?: No     Within the past 12 months, have you been humiliated or emotionally abused in other ways by your partner or ex-partner?: No   Housing Stability: Low Risk  (11/15/2023)    Housing Stability     Do you have housing? : Yes     Are you worried about losing your housing?: No       Family History     Family History   Problem Relation Age of Onset    C.A.D. Father         angina- 50     "Diabetes Mother     Hypertension No family hx of     Cerebrovascular Disease Maternal Grandfather     Cerebrovascular Disease Paternal Grandfather     Breast Cancer No family hx of     Cancer - colorectal No family hx of     Thyroid Disease Mother     Blood Disease Other         self, protein s deficiency       ROS     12 ROS completed, pertinent positive and negative in HPI    Physical Exam   There were no vitals taken for this visit.   GENERAL: alert and no distress  EYES: Eyes grossly normal to inspection.  No discharge or erythema, or obvious scleral/conjunctival abnormalities.  RESP: No audible wheeze, cough, or visible cyanosis.    SKIN: Visible skin clear. No significant rash, abnormal pigmentation or lesions.  NEURO: Cranial nerves grossly intact.  Mentation and speech appropriate for age.  PSYCH: Appropriate affect, tone, and pace of words     Labs/Imaging     Pertinent Labs were reviewed and updated in EPIC and discussed briefly.  Radiology Results were  reviewed and updated in EPIC and discussed briefly.    Summary of recent findings:   No results found for: \"A1C\"    TSH   Date Value Ref Range Status   11/15/2023 1.34 0.30 - 4.20 uIU/mL Final   07/21/2021 1.94 mIU/L Final     Comment:               Reference Range                         > or = 20 Years  0.40-4.50                              Pregnancy Ranges            First trimester    0.26-2.66            Second trimester   0.55-2.73            Third trimester    0.43-2.91  Lab test performed by:  Lab Mnemonic:[facility]  [address] [place] [address]  Van Mendes M.D.  QUEST Specimen received date and time: 22-JUL-2021 02:47:00.00     02/12/2009 0.58 0.4 - 5.0 mU/L Final   03/18/2008 1.10 0.4 - 5.0 mU/L Final   02/27/2008 1.04 0.4 - 5.0 mU/L Final   09/13/2007 0.74 0.4 - 5.0 mU/L Final   07/25/2005 1.00 0.4 - 5.0 mU/L Final     T4 Free   Date Value Ref Range Status   05/29/2009 1.11 0.70 - 1.85 ng/dL Final " "      Creatinine   Date Value Ref Range Status   07/21/2021 0.85 0.50 - 1.10 mg/dL Final     Comment:     Lab test performed by:  Lab Mnemonic:[facility]  [facility]  [address] [place] [address]  Van Mendes M.D.  QUEST Specimen received date and time: 22-JUL-2021 02:47:00.00     02/12/2009 0.70 0.52 - 1.04 mg/dL Final     Comment:     New IDMS-traceable calibration  beginning 5/1/08       No results for input(s): \"CHOL\", \"HDL\", \"LDL\", \"TRIG\", \"CHOLHDLRATIO\" in the last 12370 hours.    No results found for: \"WVCY15BQICM\", \"[id number]\", \"[id number]\"    I personally reviewed the patient's outside records from [facility]MR and Care Everywhere. Summary of pertinent findings in HPI.    Impression / Plan     1. MNG  No images to review, report from [facility] reviewed. homogeneous thyroid, no high or intermediate risk nodules. No indication for FNA. Largest nodule 1.7cm. no compressive sx.   We reviewed the nature of thyroid nodules, most are benign. We reviewed sx related to goiter and thyroid structure. We will plan on repeat US in 1 year of all stable, recheck in 2-3 years.    2. Fatigue and hair loss  Not related to the thyroid. Continue following with PCP for work up and management. If biotin started, pt should hold it for 1 week prior to thyroid labs.    3. Extensive family hx of autoimmune thyroid disease and goiter.  Recommend yearly screening with TSH , earlier if sx change. We discussed checking her TPO abx status, though it will not change our management.          Test and/or medications prescribed today:  Orders Placed This Encounter   Procedures    US Thyroid    TSH with free T4 reflex    Thyroid peroxidase antibody         Follow up: 1 year      Liz Padilla MD  Endocrinology, Diabetes and Metabolism  [facility]    "

## 2024-02-12 NOTE — NURSING NOTE
Is the patient currently in the state of [place]? YES    Visit mode:VIDEO    If the visit is dropped, the patient can be reconnected by: VIDEO VISIT: Send to e-mail at: [email or web address]    Will anyone else be joining the visit? NO  (If patient encounters technical issues they should call [phone]    How would you like to obtain your AVS? MyChart    Are changes needed to the allergy or medication list? No    Reason for visit: Consult    Kassandra HERNANDEZ

## 2024-02-12 NOTE — PROGRESS NOTES
Virtual Visit Details    Type of service:  Video Visit     Originating Location (pt. Location): Home    Distant Location (provider location):  On-site  Platform used for Video Visit: Melody

## 2024-02-12 NOTE — PROGRESS NOTES
"Endocrinology Clinic Visit 2/12/2024      Video-Visit Details    Type of service:  Video Visit    Joined the call at 2/12/2024, 8:08:10 am.  Left the call at 2/12/2024, 8:31:35 am.    Originating Location (pt. Location): Other parked car        Distant Location (provider location):  Off-site    Mode of Communication:  Video Conference via [facility]    Physician has received verbal consent for a Video Visit from the patient? Yes    I spent a total of 60 minutes on the date of encounter reviewing medical records, evaluating the patient, coordinating care and documenting in the EHR, as detailed above.      NAME:  Lazara Bahena  PCP:  Artemio Dawn  MRN:  [id number]  Reason for Consult:  goiter  Requesting Provider:  Khalida Worrell    Chief Complaint     Chief Complaint   Patient presents with    Consult       History of Present Illness     Lazara Bahena is a 43 year old female who is seen in video visit for thyroid goiter.    She reported fatigue and hair loss since last October 2013.  She felt a lump in her throat when swallowing, no pain or discomfort, people commented that her neck is big. It feels like there is always something in her throat, it doesn't feel smooth. No difficulty swallowing but \"feels more aware of swallowing\". No SOB when lying down.    She was seen by her PCP, tsh ON 11/15/2023 wnl at 1.34.  Thyroid US was done at [facility] on 11/2023, no images to review. Per their report:    RIGHT lobe: 4.6 x 1.8 x 1.3 cm. Homogeneous echotexture.   Isthmus: 2 mm.   LEFT lobe: 5 x 1.8 x 2.0 cm. Homogeneous echotexture.     NECK: No cervical lymphadenopathy.     NODULES:     Nodule 1: 0.4 x 0.3 x 0.4 cm nodule in the right mid gland.   Composition: Mixed cystic and solid, 1 point   Echogenicity: Hyperechoic or isoechoic, 1 point   Shape: Wider-than-tall, 0 points   Margin: Smooth, 0 points   Echogenic Foci: None, or large comet-tail artifacts, 0 points   Point Total: 1-2 points. TI-RADS 2. No FNA.     "   Nodule 2: Left upper pole nodule measures 1.7 x 1.2 x 1.6 cm.   Composition: Solid or almost completely solid, 2 points   Echogenicity: Hyperechoic or isoechoic, 1 point   Shape: Wider-than-tall, 0 points   Margin: Smooth, 0 points   Echogenic Foci: None, or large comet-tail artifacts, 0 points   Point Total: 3 points. TI-RADS 3. If 2.5 cm or larger, recommend FNA; if 1.5 cm or larger, recommend follow up US at 1, 3, and 5 years.     Nodule 3: There are 2 spongiform nodules in the left upper and mid gland measuring up to 0.5 cm.   Composition: Spongiform, 0 points   Point Total: 0 points. TI-RADS 1. No FNA.     Family hx: grandma had hashimotos, mother graves, daughter who is 16 was just diagnosed with graves disease. Multiple family members has goiter.    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    Problem List     Patient Active Problem List   Diagnosis    Female infertility associated with anovulation    DEPRESSION ACUTE - SINGLE EPISODE( Moderate)    Insomnia    Embolism and thrombosis (H)    Chronic tension headache    CARDIOVASCULAR SCREENING; LDL GOAL LESS THAN 160        Medications     Current Outpatient Medications   Medication    ADVIL 200 MG OR TABS    follitropin beta (FOLLISTIM AQ) 900 UNT/1.08ML cartridge    ORILISSA 200 MG TABS    PRENATAL VITAMINS PO    TYLENOL PM EXTRA STRENGTH 500-25 MG OR TABS    valproic acid (DEPAKENE) 250 MG capsule     No current facility-administered medications for this visit.        Allergies     Allergies   Allergen Reactions    Coumadin [Warfarin And Related]      At high doses had hair loss    Duloxetine Hcl Fatigue       Medical / Surgical History     Past Medical History:   Diagnosis Date    Chronic tension headache     Depressive disorder, not elsewhere classified 12/05-8/06    started on Zoloft/marques kenia on hennepin    Embolism and thrombosis of unspecified site     postop from knee surgery, neg clotting workup X2    Other blood disease     Protein S deficiency     Pregnancy with history of infertility     spontaneous conception     Past Surgical History:   Procedure Laterality Date    HC HYSTEROSALPINGOGRAM  1/19/06    tubes open    HC KNEE SCOPE, DIAGNOSTIC  16 y/o    Arthroscopy, Knee    [facility] NONSPECIFIC PROCEDURE  1986    appy    [facility] NONSPECIFIC PROCEDURE      knee surgery X 2    [facility] NONSPECIFIC PROCEDURE  1981    right hernia repair       Social History     Social History     Socioeconomic History    Marital status:      Spouse name:     Number of children: 1    Years of education: 16    Highest education level: Not on file   Occupational History    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     Employer: NONE    Tobacco Use    Smoking status: Never     Passive exposure: Never    Smokeless tobacco: Not on file   Vaping Use    Vaping Use: Never used   Substance and Sexual Activity    Alcohol use: Yes     Comment: 1 x per month    Drug use: No    Sexual activity: Yes     Partners: Male   Other Topics Concern    Not on file   Social History Narrative    Balanced Diet - Yes    Osteoporosis Prevention Measures - Dairy servings per day: 1 and Weight bearing exercise    Regular Exercise -  Yes Describe Exercises 3 days per week    Dental Exam - YES - Date: 6 months ago    Eye Exam - about 2 years ago    Self Breast Exam - yes    Abuse: Current or Past (Physical, Sexual or Emotional)- No    Do you feel safe in your environment - Yes    Guns stored in the home - No    Sunscreen used - yes 40 spf daily    Seatbelts used - Yes    Lipids -  NO    Glucose -  Yes, when she was pregnant 01/30/07    Hemoccults - NA    Pap Test -  Yes- 09/15/06    Do you have any concerns about STD's -  No    Mammography - NA    DEXA - NA    Immunizations reviewed and up to date - last td was possibly given in 2004, would have to check at home    Savannah Johnson ma  2007             Social Determinants of Health     Financial Resource Strain: Low Risk  (11/15/2023)    Financial Resource Strain     Within the past  12 months, have you or your family members you live with been unable to get utilities (heat, electricity) when it was really needed?: No   Food Insecurity: Low Risk  (11/15/2023)    Food Insecurity     Within the past 12 months, did you worry that your food would run out before you got money to buy more?: No     Within the past 12 months, did the food you bought just not last and you didn t have money to get more?: No   Transportation Needs: Low Risk  (11/15/2023)    Transportation Needs     Within the past 12 months, has lack of transportation kept you from medical appointments, getting your medicines, non-medical meetings or appointments, work, or from getting things that you need?: No   Physical Activity: Not on file   Stress: Not on file   Social Connections: Not on file   Interpersonal Safety: Low Risk  (11/15/2023)    Interpersonal Safety     Do you feel physically and emotionally safe where you currently live?: Yes     Within the past 12 months, have you been hit, slapped, kicked or otherwise physically hurt by someone?: No     Within the past 12 months, have you been humiliated or emotionally abused in other ways by your partner or ex-partner?: No   Housing Stability: Low Risk  (11/15/2023)    Housing Stability     Do you have housing? : Yes     Are you worried about losing your housing?: No       Family History     Family History   Problem Relation Age of Onset    C.A.D. Father         angina- 50    Diabetes Mother     Hypertension No family hx of     Cerebrovascular Disease Maternal Grandfather     Cerebrovascular Disease Paternal Grandfather     Breast Cancer No family hx of     Cancer - colorectal No family hx of     Thyroid Disease Mother     Blood Disease Other         self, protein s deficiency       ROS     12 ROS completed, pertinent positive and negative in HPI    Physical Exam   There were no vitals taken for this visit.   GENERAL: alert and no distress  EYES: Eyes grossly normal to inspection.   "No discharge or erythema, or obvious scleral/conjunctival abnormalities.  RESP: No audible wheeze, cough, or visible cyanosis.    SKIN: Visible skin clear. No significant rash, abnormal pigmentation or lesions.  NEURO: Cranial nerves grossly intact.  Mentation and speech appropriate for age.  PSYCH: Appropriate affect, tone, and pace of words     Labs/Imaging     Pertinent Labs were reviewed and updated in EPIC and discussed briefly.  Radiology Results were  reviewed and updated in EPIC and discussed briefly.    Summary of recent findings:   No results found for: \"A1C\"    TSH   Date Value Ref Range Status   11/15/2023 1.34 0.30 - 4.20 uIU/mL Final   07/21/2021 1.94 mIU/L Final     Comment:               Reference Range                         > or = 20 Years  0.40-4.50                              Pregnancy Ranges            First trimester    0.26-2.66            Second trimester   0.55-2.73            Third trimester    0.43-2.91  Lab test performed by:  Lab Mnemonic:[facility]  [address] [place] [address]  Van Mendes M.D.  QUEST Specimen received date and time: 22-JUL-2021 02:47:00.00     02/12/2009 0.58 0.4 - 5.0 mU/L Final   03/18/2008 1.10 0.4 - 5.0 mU/L Final   02/27/2008 1.04 0.4 - 5.0 mU/L Final   09/13/2007 0.74 0.4 - 5.0 mU/L Final   07/25/2005 1.00 0.4 - 5.0 mU/L Final     T4 Free   Date Value Ref Range Status   05/29/2009 1.11 0.70 - 1.85 ng/dL Final       Creatinine   Date Value Ref Range Status   07/21/2021 0.85 0.50 - 1.10 mg/dL Final     Comment:     Lab test performed by:  Lab Mnemonic:[facility]  [address] | [place] [address]  Van Mendes M.D.  QUEST Specimen received date and time: 22-JUL-2021 02:47:00.00     02/12/2009 0.70 0.52 - 1.04 mg/dL Final     Comment:     New IDMS-traceable calibration  beginning 5/1/08       No results for input(s): \"CHOL\", \"HDL\", \"LDL\", \"TRIG\", \"CHOLHDLRATIO\" in the last 72000 " "hours.    No results found for: \"[id number]\", \"[id number]\", \"[id number]\"    I personally reviewed the patient's outside records from [facility] EMR and Care Everywhere. Summary of pertinent findings in HPI.    Impression / Plan     1. MNG  No images to review, report from [facility] reviewed. homogeneous thyroid, no high or intermediate risk nodules. No indication for FNA. Largest nodule 1.7cm. no compressive sx.   We reviewed the nature of thyroid nodules, most are benign. We reviewed sx related to goiter and thyroid structure. We will plan on repeat US in 1 year of all stable, recheck in 2-3 years.    2. Fatigue and hair loss  Not related to the thyroid. Continue following with PCP for work up and management. If biotin started, pt should hold it for 1 week prior to thyroid labs.    3. Extensive family hx of autoimmune thyroid disease and goiter.  Recommend yearly screening with TSH , earlier if sx change. We discussed checking her TPO abx status, though it will not change our management.          Test and/or medications prescribed today:  Orders Placed This Encounter   Procedures    US Thyroid    TSH with free T4 reflex    Thyroid peroxidase antibody         Follow up: 1 year      Liz Padilla MD  Endocrinology, Diabetes and Metabolism  [facility]    "

## 2024-07-01 ENCOUNTER — LAB REQUISITION (OUTPATIENT)
Dept: LAB | Facility: CLINIC | Age: 44
End: 2024-07-01

## 2024-07-01 DIAGNOSIS — Z30.430 ENCOUNTER FOR INSERTION OF INTRAUTERINE CONTRACEPTIVE DEVICE: ICD-10-CM

## 2024-07-01 PROCEDURE — 87491 CHLMYD TRACH DNA AMP PROBE: CPT | Performed by: OBSTETRICS & GYNECOLOGY

## 2024-07-03 LAB
C TRACH DNA SPEC QL PROBE+SIG AMP: NEGATIVE
N GONORRHOEA DNA SPEC QL NAA+PROBE: NEGATIVE

## 2024-07-11 ENCOUNTER — TRANSFERRED RECORDS (OUTPATIENT)
Dept: HEALTH INFORMATION MANAGEMENT | Facility: CLINIC | Age: 44
End: 2024-07-11

## 2024-07-15 ENCOUNTER — PATIENT OUTREACH (OUTPATIENT)
Dept: CARE COORDINATION | Facility: CLINIC | Age: 44
End: 2024-07-15

## 2024-07-29 ENCOUNTER — PATIENT OUTREACH (OUTPATIENT)
Dept: CARE COORDINATION | Facility: CLINIC | Age: 44
End: 2024-07-29

## 2024-08-01 ENCOUNTER — TRANSFERRED RECORDS (OUTPATIENT)
Dept: MULTI SPECIALTY CLINIC | Facility: CLINIC | Age: 44
End: 2024-08-01

## 2024-08-01 LAB — PAP SMEAR - HIM PATIENT REPORTED: NORMAL

## 2024-11-10 ENCOUNTER — HEALTH MAINTENANCE LETTER (OUTPATIENT)
Age: 44
End: 2024-11-10

## 2024-12-08 ENCOUNTER — OFFICE VISIT (OUTPATIENT)
Dept: URGENT CARE | Facility: URGENT CARE | Age: 44
End: 2024-12-08
Payer: COMMERCIAL

## 2024-12-08 VITALS
OXYGEN SATURATION: 99 % | BODY MASS INDEX: 21.8 KG/M2 | RESPIRATION RATE: 16 BRPM | SYSTOLIC BLOOD PRESSURE: 93 MMHG | TEMPERATURE: 97.7 F | HEART RATE: 76 BPM | WEIGHT: 125 LBS | DIASTOLIC BLOOD PRESSURE: 62 MMHG

## 2024-12-08 DIAGNOSIS — N93.9 ABNORMAL UTERINE BLEEDING: ICD-10-CM

## 2024-12-08 DIAGNOSIS — R10.2 PELVIC PAIN IN FEMALE: ICD-10-CM

## 2024-12-08 DIAGNOSIS — N39.0 URINARY TRACT INFECTION WITHOUT HEMATURIA, SITE UNSPECIFIED: ICD-10-CM

## 2024-12-08 DIAGNOSIS — R10.2 PELVIC PAIN IN FEMALE: Primary | ICD-10-CM

## 2024-12-08 DIAGNOSIS — R11.2 NAUSEA AND VOMITING, UNSPECIFIED VOMITING TYPE: ICD-10-CM

## 2024-12-08 LAB
ALBUMIN UR-MCNC: 30 MG/DL
ANION GAP SERPL CALCULATED.3IONS-SCNC: 9 MMOL/L (ref 7–15)
APPEARANCE UR: ABNORMAL
BACTERIA #/AREA URNS HPF: ABNORMAL /HPF
BASOPHILS # BLD AUTO: 0 10E3/UL (ref 0–0.2)
BASOPHILS NFR BLD AUTO: 0 %
BILIRUB UR QL STRIP: NEGATIVE
BUN SERPL-MCNC: 14.7 MG/DL (ref 6–20)
CALCIUM SERPL-MCNC: 9.1 MG/DL (ref 8.8–10.4)
CHLORIDE SERPL-SCNC: 105 MMOL/L (ref 98–107)
CLUE CELLS: ABNORMAL
COLOR UR AUTO: YELLOW
CREAT SERPL-MCNC: 0.74 MG/DL (ref 0.51–0.95)
EGFRCR SERPLBLD CKD-EPI 2021: >90 ML/MIN/1.73M2
EOSINOPHIL # BLD AUTO: 0.2 10E3/UL (ref 0–0.7)
EOSINOPHIL NFR BLD AUTO: 1 %
ERYTHROCYTE [DISTWIDTH] IN BLOOD BY AUTOMATED COUNT: 12.1 % (ref 10–15)
GLUCOSE SERPL-MCNC: 110 MG/DL (ref 70–99)
GLUCOSE UR STRIP-MCNC: NEGATIVE MG/DL
HCG UR QL: NEGATIVE
HCO3 SERPL-SCNC: 26 MMOL/L (ref 22–29)
HCT VFR BLD AUTO: 41.3 % (ref 35–47)
HCV AB SERPL QL IA: NONREACTIVE
HGB BLD-MCNC: 13.4 G/DL (ref 11.7–15.7)
HGB UR QL STRIP: ABNORMAL
HIV 1+2 AB+HIV1 P24 AG SERPL QL IA: NONREACTIVE
IMM GRANULOCYTES # BLD: 0 10E3/UL
IMM GRANULOCYTES NFR BLD: 0 %
KETONES UR STRIP-MCNC: NEGATIVE MG/DL
LEUKOCYTE ESTERASE UR QL STRIP: ABNORMAL
LYMPHOCYTES # BLD AUTO: 1.5 10E3/UL (ref 0.8–5.3)
LYMPHOCYTES NFR BLD AUTO: 11 %
MCH RBC QN AUTO: 31.2 PG (ref 26.5–33)
MCHC RBC AUTO-ENTMCNC: 32.4 G/DL (ref 31.5–36.5)
MCV RBC AUTO: 96 FL (ref 78–100)
MONOCYTES # BLD AUTO: 0.6 10E3/UL (ref 0–1.3)
MONOCYTES NFR BLD AUTO: 5 %
MUCOUS THREADS #/AREA URNS LPF: PRESENT /LPF
NEUTROPHILS # BLD AUTO: 11.1 10E3/UL (ref 1.6–8.3)
NEUTROPHILS NFR BLD AUTO: 83 %
NITRATE UR QL: NEGATIVE
PH UR STRIP: 8 [PH] (ref 5–7)
PLATELET # BLD AUTO: 257 10E3/UL (ref 150–450)
POTASSIUM SERPL-SCNC: 4.5 MMOL/L (ref 3.4–5.3)
RBC # BLD AUTO: 4.3 10E6/UL (ref 3.8–5.2)
RBC #/AREA URNS AUTO: ABNORMAL /HPF
SODIUM SERPL-SCNC: 140 MMOL/L (ref 135–145)
SP GR UR STRIP: 1.01 (ref 1–1.03)
SQUAMOUS #/AREA URNS AUTO: ABNORMAL /LPF
TRANS CELLS #/AREA URNS HPF: ABNORMAL /HPF
TRICHOMONAS, WET PREP: ABNORMAL
TSH SERPL DL<=0.005 MIU/L-ACNC: 1.06 UIU/ML (ref 0.3–4.2)
UROBILINOGEN UR STRIP-ACNC: 0.2 E.U./DL
WBC # BLD AUTO: 13.4 10E3/UL (ref 4–11)
WBC #/AREA URNS AUTO: ABNORMAL /HPF
WBC CLUMPS #/AREA URNS HPF: PRESENT /HPF
WBC'S/HIGH POWER FIELD, WET PREP: ABNORMAL
YEAST, WET PREP: ABNORMAL

## 2024-12-08 PROCEDURE — 84443 ASSAY THYROID STIM HORMONE: CPT | Performed by: PHYSICIAN ASSISTANT

## 2024-12-08 PROCEDURE — 87591 N.GONORRHOEAE DNA AMP PROB: CPT | Performed by: PHYSICIAN ASSISTANT

## 2024-12-08 PROCEDURE — 99214 OFFICE O/P EST MOD 30 MIN: CPT | Performed by: PHYSICIAN ASSISTANT

## 2024-12-08 PROCEDURE — 86780 TREPONEMA PALLIDUM: CPT | Performed by: PHYSICIAN ASSISTANT

## 2024-12-08 PROCEDURE — 81025 URINE PREGNANCY TEST: CPT | Performed by: PHYSICIAN ASSISTANT

## 2024-12-08 PROCEDURE — 87086 URINE CULTURE/COLONY COUNT: CPT | Performed by: PHYSICIAN ASSISTANT

## 2024-12-08 PROCEDURE — 87210 SMEAR WET MOUNT SALINE/INK: CPT | Performed by: PHYSICIAN ASSISTANT

## 2024-12-08 PROCEDURE — 87389 HIV-1 AG W/HIV-1&-2 AB AG IA: CPT | Performed by: PHYSICIAN ASSISTANT

## 2024-12-08 PROCEDURE — 87491 CHLMYD TRACH DNA AMP PROBE: CPT | Performed by: PHYSICIAN ASSISTANT

## 2024-12-08 PROCEDURE — 86803 HEPATITIS C AB TEST: CPT | Performed by: PHYSICIAN ASSISTANT

## 2024-12-08 PROCEDURE — 80048 BASIC METABOLIC PNL TOTAL CA: CPT | Performed by: PHYSICIAN ASSISTANT

## 2024-12-08 PROCEDURE — 85025 COMPLETE CBC W/AUTO DIFF WBC: CPT | Performed by: PHYSICIAN ASSISTANT

## 2024-12-08 PROCEDURE — 81001 URINALYSIS AUTO W/SCOPE: CPT | Performed by: PHYSICIAN ASSISTANT

## 2024-12-08 PROCEDURE — 36415 COLL VENOUS BLD VENIPUNCTURE: CPT | Performed by: PHYSICIAN ASSISTANT

## 2024-12-08 RX ORDER — OXYCODONE HYDROCHLORIDE 5 MG/1
5 TABLET ORAL
COMMUNITY
Start: 2024-12-07

## 2024-12-08 RX ORDER — LEVONORGESTREL 52 MG/1
1 INTRAUTERINE DEVICE INTRAUTERINE ONCE
COMMUNITY
Start: 2024-07-01

## 2024-12-08 RX ORDER — CIPROFLOXACIN 500 MG/1
500 TABLET, FILM COATED ORAL 2 TIMES DAILY
Qty: 14 TABLET | Refills: 0 | Status: SHIPPED | OUTPATIENT
Start: 2024-12-08 | End: 2024-12-15

## 2024-12-08 RX ORDER — NAPROXEN 500 MG/1
500 TABLET ORAL 2 TIMES DAILY PRN
Qty: 30 TABLET | Refills: 0 | Status: SHIPPED | OUTPATIENT
Start: 2024-12-08

## 2024-12-08 RX ORDER — RIZATRIPTAN BENZOATE 10 MG/1
5 TABLET ORAL
COMMUNITY

## 2024-12-08 RX ORDER — ONDANSETRON 8 MG/1
8 TABLET, ORALLY DISINTEGRATING ORAL ONCE
Status: COMPLETED | OUTPATIENT
Start: 2024-12-08 | End: 2024-12-08

## 2024-12-08 RX ORDER — PHENAZOPYRIDINE HYDROCHLORIDE 200 MG/1
200 TABLET, FILM COATED ORAL 3 TIMES DAILY PRN
Qty: 9 TABLET | Refills: 0 | Status: SHIPPED | OUTPATIENT
Start: 2024-12-08 | End: 2024-12-11

## 2024-12-08 RX ORDER — ONDANSETRON 4 MG/1
4 TABLET, ORALLY DISINTEGRATING ORAL EVERY 8 HOURS PRN
Qty: 12 TABLET | Refills: 0 | Status: SHIPPED | OUTPATIENT
Start: 2024-12-08

## 2024-12-08 RX ADMIN — ONDANSETRON 8 MG: 8 TABLET, ORALLY DISINTEGRATING ORAL at 10:16

## 2024-12-08 ASSESSMENT — PAIN SCALES - GENERAL: PAINLEVEL_OUTOF10: EXTREME PAIN (8)

## 2024-12-08 NOTE — PROGRESS NOTES
bmAssessment & Plan     Pelvic pain in female  Pt has acute abdomen pain with hx of ongoing pelvic pain with abnl uterine bleeding for which she has been followed  by her OB.    She has new irritative voiding symptoms and back pain, with exam finding of LLQ discomfort on bimanual exam but no CVAT.   UA suggests uti. Given cipro as ordered given back pain/lower abdomen pain on the L suggesting pyelonephritis.  Discussed black box warning. Awaiting culture.       Small amount of blood in the urine but no gross hematuria. She has not had kidney stones in the past.    Exam is not suggestive of PID or endometritis given no CMT, uterus is nontender (just some pressure making it feel like she could urinate) and no mucopurulent discharge.    Of note however I am unable to see or feel IUD strings suggesting migration.  UPT is negative.    Wet prep is negative   Previous GC/Chlamydia test prior to IUD placement was negative. Pt requests repeating STI testing including other STI testing for HIV, syphalis, Hep C which were done today, will contact with results.    CBC mildly elevated, which would be consistent with early pyelonephritis.    BMP pending.    Will check TSH given AB uterine bleeding.    Pt is to follow-up with her gyn provider tomorrow.    She may do naproxen for pain   Recommended holding off on oxycodone for pain given nausea/vomiting.  Difficult to say if it is the pain, oxycodone or possible pyelonephritis causing her sx of vomiting. Zofran for nausea/vomiting.   I did call later in the day and pt reported her pain was somewhat improved. She was able to keep in one of the cipro and naproxen for pain.    Pt will follow-up with obgyn, will likely need pelvic US, evaluate location of IUD and evaluate for any additional pelvic pathology.    Pt is aware she should present to the ED for any severe or worsening abdomen pain, fevers, unable to keep medications down.    I will call with results as they return.      -  CBC with platelets and differential  - HIV Antigen Antibody Combo  - Treponema Abs w Reflex to RPR and Titer  - Hepatitis C Screen Reflex to HCV RNA Quant and Genotype  - NEISSERIA GONORRHOEA PCR  - CHLAMYDIA TRACHOMATIS PCR  - Wet prep - Clinic Collect  - Basic metabolic panel  (Ca, Cl, CO2, Creat, Gluc, K, Na, BUN)  - CBC with platelets and differential  - HIV Antigen Antibody Combo  - Treponema Abs w Reflex to RPR and Titer  - Hepatitis C Screen Reflex to HCV RNA Quant and Genotype  - Basic metabolic panel  (Ca, Cl, CO2, Creat, Gluc, K, Na, BUN)  - naproxen (NAPROSYN) 500 MG tablet  Dispense: 30 tablet; Refill: 0    Urinary tract infection without hematuria, site unspecified  Cipro as ordered to cover for probable L pyelonephritis, with L side pain and back pain though difficulty to tell if that is related to her ongoing pelvic pain and cramping.    - phenazopyridine (PYRIDIUM) 200 MG tablet  Dispense: 9 tablet; Refill: 0  - ciprofloxacin (CIPRO) 500 MG tablet  Dispense: 14 tablet; Refill: 0    Nausea and vomiting, unspecified vomiting type  Zofran as ordered.  ? From opiates vs illness, vs pain.   - ondansetron (ZOFRAN ODT) ODT tab 8 mg  - Basic metabolic panel  (Ca, Cl, CO2, Creat, Gluc, K, Na, BUN)  - Basic metabolic panel  (Ca, Cl, CO2, Creat, Gluc, K, Na, BUN)  - ondansetron (ZOFRAN ODT) 4 MG ODT tab  Dispense: 12 tablet; Refill: 0           Camille Rice PA-C  [facility]    Subjective     Lazara is a 44 year old female who presents to clinic today for the following health issues:  Chief Complaint   Patient presents with    Pain     Abdominal pain radiating into the pelvis, legs and low back. Ongoing bleeding and cramping. Upcoming appointment with GYN to discuss hysterectomy. Urinary frequency/urgency started yesterday.        HPI: pt is a 44 year old female who presents to urgent care with concerns re: pelvic pain, dysuria, frequency, urgency x 1 day.    Pt has acute on  chronic pelvic pain.  She has been followed by her primary obgyn Dr. Arellano with plan to be seen this week for further work up.    Frequent bleeding and cramping since placement of the IUD.  Daily bleeding, cramping pain.    Has not had pelvic US yet. Has scheduled follow-up this week for further evaluation.    In the last few days developed dysuria, frequency, urgency and increased pain, pressure in the lower abdomen. Radiated to her legs some last night was quite severe.   It was quite severe yesterday and radiated to the back and legs.    Went to the ER but did not stay, gyn called in some oxycodone to make it through the weekend and then assess early in the week.  Pt found oxycodone  to be helpful to sleep last night and took last dose this morning, somewhat helpful this morning but not completely but does have worsening developing dysuria, frequency, urgency.    Pt has had nausea, vomiting while in exam room.    Able to take sips of water.    No fevers. No vaginal itching, irritation, change of discharge (other than spotting).    Does not check strings  but they were checked at last office visit by pcp.    Requests repeat STI testing, GC/Chlamydia was done at time of IUD placement but would like to repeat.       Review of Systems  Constitutional, HEENT, cardiovascular, pulmonary, gi and gu systems are negative, except as otherwise noted.      Patient Active Problem List   Diagnosis    Female infertility associated with anovulation    DEPRESSION ACUTE - SINGLE EPISODE( Moderate)    Insomnia    Embolism and thrombosis (H)    Chronic tension headache    CARDIOVASCULAR SCREENING; LDL GOAL LESS THAN 160     Current Outpatient Medications   Medication Sig Dispense Refill    ciprofloxacin (CIPRO) 500 MG tablet Take 1 tablet (500 mg) by mouth 2 times daily for 7 days. 14 tablet 0    levonorgestrel (MIRENA, 52 MG,) 52 MG (20 mcg/day) IUD 1 each by Intrauterine route once.      naproxen (NAPROSYN) 500 MG tablet Take 1  tablet (500 mg) by mouth 2 times daily as needed for moderate pain. Take with food 30 tablet 0    ondansetron (ZOFRAN ODT) 4 MG ODT tab Take 1 tablet (4 mg) by mouth every 8 hours as needed for nausea. 12 tablet 0    ORILISSA 200 MG TABS Take 1 tablet by mouth daily at 2 pm      oxyCODONE (ROXICODONE) 5 MG tablet Take 5 mg by mouth every 4 weeks as needed.      phenazopyridine (PYRIDIUM) 200 MG tablet Take 1 tablet (200 mg) by mouth 3 times daily as needed for irritation. 9 tablet 0    rizatriptan (MAXALT) 10 MG tablet Take 5 mg by mouth at onset of headache.      valproic acid (DEPAKENE) 250 MG capsule Take 250 mg by mouth daily       No current facility-administered medications for this visit.       Objective    BP 93/62 (BP Location: Left arm, Patient Position: Right side)   Pulse 76   Temp 97.7  F (36.5  C) (Tympanic)   Resp 16   Wt 56.7 kg (125 lb)   LMP  (LMP Unknown)   SpO2 99%   BMI 21.80 kg/m    Physical Exam   Patient is in no acute distress and appears uncomfortable.    Abdomen: BS active, abdomen is soft,  tenderness to deep palpaiton LLQ but no rebound or peritoneal signs. No masses or hsm.    No costovertebral angle tenderness is present.  Pelvic exam: EG that of normal female, cervix is closed, not able to visualize or palpate strings of IUD.  No mucopurulent discharge.    No CMT present.   Uterus tilted posterior difficult to appreciate exact size but nontneder to palpation. [place]enexa without masses, mild tenderness L adnexa.        Results for orders placed or performed in visit on 12/08/24   UA Macroscopic with reflex to Microscopic and Culture - Clinic Collect     Status: Abnormal    Specimen: Urine, Clean Catch   Result Value Ref Range    Color Urine Yellow Colorless, Straw, Light Yellow, Yellow    Appearance Urine Cloudy (A) Clear    Glucose Urine Negative Negative mg/dL    Bilirubin Urine Negative Negative    Ketones Urine Negative Negative mg/dL    Specific Gravity Urine 1.015 1.003 -  1.035    Blood Urine Moderate (A) Negative    pH Urine 8.0 (H) 5.0 - 7.0    Protein Albumin Urine 30 (A) Negative mg/dL    Urobilinogen Urine 0.2 0.2, 1.0 E.U./dL    Nitrite Urine Negative Negative    Leukocyte Esterase Urine Moderate (A) Negative   HCG qualitative urine     Status: Normal   Result Value Ref Range    hCG Urine Qualitative Negative Negative   Urine Microscopic Exam     Status: Abnormal   Result Value Ref Range    Bacteria Urine Many (A) None Seen /HPF    RBC Urine 5-10 (A) 0-2 /HPF /HPF    WBC Urine  (A) 0-5 /HPF /HPF    Squamous Epithelials Urine Few (A) None Seen /LPF    WBC Clumps Urine Present (A) None Seen /HPF    Transitional Epithelials Urine Few (A) None Seen /HPF    Mucus Urine Present (A) None Seen /LPF   HIV Antigen Antibody Combo     Status: Normal   Result Value Ref Range    HIV Antigen Antibody Combo Nonreactive Nonreactive   Hepatitis C Screen Reflex to HCV RNA Quant and Genotype     Status: Normal   Result Value Ref Range    Hepatitis C Antibody Nonreactive Nonreactive   Basic metabolic panel  (Ca, Cl, CO2, Creat, Gluc, K, Na, BUN)     Status: Abnormal   Result Value Ref Range    Sodium 140 135 - 145 mmol/L    Potassium 4.5 3.4 - 5.3 mmol/L    Chloride 105 98 - 107 mmol/L    Carbon Dioxide (CO2) 26 22 - 29 mmol/L    Anion Gap 9 7 - 15 mmol/L    Urea Nitrogen 14.7 6.0 - 20.0 mg/dL    Creatinine 0.74 0.51 - 0.95 mg/dL    GFR Estimate >90 >60 mL/min/1.73m2    Calcium 9.1 8.8 - 10.4 mg/dL    Glucose 110 (H) 70 - 99 mg/dL   CBC with platelets and differential     Status: Abnormal   Result Value Ref Range    WBC Count 13.4 (H) 4.0 - 11.0 10e3/uL    RBC Count 4.30 3.80 - 5.20 10e6/uL    Hemoglobin 13.4 11.7 - 15.7 g/dL    Hematocrit 41.3 35.0 - 47.0 %    MCV 96 78 - 100 fL    MCH 31.2 26.5 - 33.0 pg    MCHC 32.4 31.5 - 36.5 g/dL    RDW 12.1 10.0 - 15.0 %    Platelet Count 257 150 - 450 10e3/uL    % Neutrophils 83 %    % Lymphocytes 11 %    % Monocytes 5 %    % Eosinophils 1 %    %  Basophils 0 %    % Immature Granulocytes 0 %    Absolute Neutrophils 11.1 (H) 1.6 - 8.3 10e3/uL    Absolute Lymphocytes 1.5 0.8 - 5.3 10e3/uL    Absolute Monocytes 0.6 0.0 - 1.3 10e3/uL    Absolute Eosinophils 0.2 0.0 - 0.7 10e3/uL    Absolute Basophils 0.0 0.0 - 0.2 10e3/uL    Absolute Immature Granulocytes 0.0 <=0.4 10e3/uL   Wet prep - Clinic Collect     Status: Abnormal    Specimen: Vagina; Swab   Result Value Ref Range    Trichomonas Absent Absent    Yeast Absent Absent    Clue Cells Absent Absent    WBCs/high power field 3+ (A) None   CBC with platelets and differential     Status: Abnormal    Narrative    The following orders were created for panel order CBC with platelets and differential.  Procedure                               Abnormality         Status                     ---------                               -----------         ------                     CBC with platelets and d...[[id number]]  Abnormal            Final result                 Please view results for these tests on the individual orders.

## 2024-12-09 LAB
BACTERIA UR CULT: NORMAL
C TRACH DNA SPEC QL NAA+PROBE: NEGATIVE
N GONORRHOEA DNA SPEC QL NAA+PROBE: NEGATIVE
T PALLIDUM AB SER QL: NONREACTIVE

## 2024-12-09 RX ORDER — NAPROXEN 500 MG/1
TABLET ORAL
Qty: 30 TABLET | Refills: 0 | OUTPATIENT
Start: 2024-12-09

## 2024-12-23 ENCOUNTER — LAB REQUISITION (OUTPATIENT)
Dept: LAB | Facility: CLINIC | Age: 44
End: 2024-12-23

## 2024-12-23 DIAGNOSIS — N92.0 EXCESSIVE AND FREQUENT MENSTRUATION WITH REGULAR CYCLE: ICD-10-CM

## 2024-12-23 PROCEDURE — 88305 TISSUE EXAM BY PATHOLOGIST: CPT | Performed by: PATHOLOGY

## 2024-12-26 LAB
PATH REPORT.COMMENTS IMP SPEC: NORMAL
PATH REPORT.COMMENTS IMP SPEC: NORMAL
PATH REPORT.FINAL DX SPEC: NORMAL
PATH REPORT.GROSS SPEC: NORMAL
PATH REPORT.MICROSCOPIC SPEC OTHER STN: NORMAL
PATH REPORT.RELEVANT HX SPEC: NORMAL
PHOTO IMAGE: NORMAL

## 2025-02-25 ENCOUNTER — OFFICE VISIT (OUTPATIENT)
Dept: FAMILY MEDICINE | Facility: CLINIC | Age: 45
End: 2025-02-25
Payer: COMMERCIAL

## 2025-02-25 VITALS
BODY MASS INDEX: 21.97 KG/M2 | DIASTOLIC BLOOD PRESSURE: 60 MMHG | RESPIRATION RATE: 16 BRPM | TEMPERATURE: 98.1 F | HEIGHT: 64 IN | OXYGEN SATURATION: 99 % | SYSTOLIC BLOOD PRESSURE: 94 MMHG | WEIGHT: 128.7 LBS | HEART RATE: 64 BPM

## 2025-02-25 DIAGNOSIS — N89.8 VAGINAL ITCHING: ICD-10-CM

## 2025-02-25 DIAGNOSIS — R30.0 DYSURIA: Primary | ICD-10-CM

## 2025-02-25 LAB
ALBUMIN UR-MCNC: NEGATIVE MG/DL
APPEARANCE UR: CLEAR
BILIRUB UR QL STRIP: NEGATIVE
COLOR UR AUTO: YELLOW
GLUCOSE UR STRIP-MCNC: NEGATIVE MG/DL
HGB UR QL STRIP: NEGATIVE
KETONES UR STRIP-MCNC: NEGATIVE MG/DL
LEUKOCYTE ESTERASE UR QL STRIP: NEGATIVE
NITRATE UR QL: NEGATIVE
PH UR STRIP: 7 [PH] (ref 5–7)
SP GR UR STRIP: 1.01 (ref 1–1.03)
UROBILINOGEN UR STRIP-ACNC: 0.2 E.U./DL

## 2025-02-25 PROCEDURE — 81003 URINALYSIS AUTO W/O SCOPE: CPT | Mod: QW | Performed by: NURSE PRACTITIONER

## 2025-02-25 PROCEDURE — 99213 OFFICE O/P EST LOW 20 MIN: CPT | Performed by: NURSE PRACTITIONER

## 2025-02-25 RX ORDER — FLUCONAZOLE 150 MG/1
TABLET ORAL
Qty: 2 TABLET | Refills: 0 | Status: SHIPPED | OUTPATIENT
Start: 2025-02-25

## 2025-02-25 RX ORDER — RELUGOLIX, ESTRADIOL HEMIHYDRATE, AND NORETHINDRONE ACETATE 40; 1; .5 MG/1; MG/1; MG/1
1 TABLET, FILM COATED ORAL DAILY
COMMUNITY
Start: 2025-02-10

## 2025-02-25 RX ORDER — MEDROXYPROGESTERONE ACETATE 10 MG
1 TABLET ORAL 2 TIMES DAILY
COMMUNITY
End: 2025-02-25

## 2025-02-25 ASSESSMENT — PATIENT HEALTH QUESTIONNAIRE - PHQ9
SUM OF ALL RESPONSES TO PHQ QUESTIONS 1-9: 0
10. IF YOU CHECKED OFF ANY PROBLEMS, HOW DIFFICULT HAVE THESE PROBLEMS MADE IT FOR YOU TO DO YOUR WORK, TAKE CARE OF THINGS AT HOME, OR GET ALONG WITH OTHER PEOPLE: NOT DIFFICULT AT ALL

## 2025-02-25 NOTE — PROGRESS NOTES
Assessment & Plan     (R30.0) Dysuria  (primary encounter diagnosis)  Comment:   Plan: UA Macroscopic with reflex to Microscopic and         Culture            (N89.8) Vaginal itching  Comment:   Plan: UA Macroscopic with reflex to Microscopic and         Culture, fluconazole (DIFLUCAN) 150 MG tablet            Given report of vaginal itch with dysuria (has been scratching labia due to itch, and no urinary frequency AND normal UA, suspect that this is NOT infection driven but rather yeast vaginitis. Counseled on how to treat with diflucan and to repeat in 48 hours if necessary. Certainly seek follow up if not resolving  Planning for hysterectomy mid March                Subjective   Lazara is a 44 year old, presenting for the following health issues: patient started to have pain with urination a few days ago, she is also having vaginal itch and irritation,  having hysterectomy next month due to frequent bleeding  Dysuria and Vaginal Itching        2/25/2025     9:40 AM   Additional Questions   Roomed by christie colvin   Accompanied by self     Via the Health Maintenance questionnaire, the patient has reported the following services have been completed -Cervical Cancer Screening: [facility] 2024-08-01, this information has been sent to the abstraction team.    History of Present Illness       Reason for visit:  Uti  Symptom onset:  1-3 days ago   She is taking medications regularly.     Genitourinary - Female    Description:   Painful urination (Dysuria): YES           Frequency: No  Blood in urine (Hematuria): YES - very irregular periods  Delay in urine (Hesitency): No  Accompanying Signs & Symptoms:  Fever/chills: No  Flank pain: No  Nausea and vomiting: No  Vaginal symptoms: itching  Abdominal/Pelvic Pain: No  History:   History of frequent UTI s: No  History of kidney stones: No  Sexually Active: YES  Possibility of pregnancy: No              Objective    BP 94/60 (BP Location: Right arm, Patient Position: Sitting)   " Pulse 64   Temp 98.1  F (36.7  C)   Resp 16   Ht 1.613 m (5' 3.5\")   Wt 58.4 kg (128 lb 11.2 oz)   LMP  (LMP Unknown)   SpO2 99%   Breastfeeding No   BMI 22.44 kg/m    Body mass index is 22.44 kg/m .  Physical Exam   Aox3, skin warm pink      Results for orders placed or performed in visit on 02/25/25 (from the past 24 hours)   UA Macroscopic with reflex to Microscopic and Culture    Specimen: Urine, Clean Catch   Result Value Ref Range    Color Urine Yellow Colorless, Straw, Light Yellow, Yellow    Appearance Urine Clear Clear    Glucose Urine Negative Negative mg/dL    Bilirubin Urine Negative Negative    Ketones Urine Negative Negative mg/dL    Specific Gravity Urine 1.015 1.003 - 1.035    Blood Urine Negative Negative    pH Urine 7.0 5.0 - 7.0    Protein Albumin Urine Negative Negative mg/dL    Urobilinogen Urine 0.2 0.2, 1.0 E.U./dL    Nitrite Urine Negative Negative    Leukocyte Esterase Urine Negative Negative    Narrative    Microscopic not indicated           Signed Electronically by: Sarahi Daniel NP    "

## 2025-02-26 ENCOUNTER — OFFICE VISIT (OUTPATIENT)
Dept: FAMILY MEDICINE | Facility: CLINIC | Age: 45
End: 2025-02-26
Payer: COMMERCIAL

## 2025-02-26 VITALS
HEART RATE: 72 BPM | TEMPERATURE: 98.1 F | OXYGEN SATURATION: 100 % | SYSTOLIC BLOOD PRESSURE: 95 MMHG | BODY MASS INDEX: 22.86 KG/M2 | WEIGHT: 129 LBS | DIASTOLIC BLOOD PRESSURE: 65 MMHG | HEIGHT: 63 IN | RESPIRATION RATE: 18 BRPM

## 2025-02-26 DIAGNOSIS — Z00.00 HEALTH CARE MAINTENANCE: ICD-10-CM

## 2025-02-26 DIAGNOSIS — E04.1 THYROID NODULE: ICD-10-CM

## 2025-02-26 DIAGNOSIS — K58.2 IRRITABLE BOWEL SYNDROME WITH BOTH CONSTIPATION AND DIARRHEA: ICD-10-CM

## 2025-02-26 DIAGNOSIS — N92.1 MENORRHAGIA WITH IRREGULAR CYCLE: ICD-10-CM

## 2025-02-26 DIAGNOSIS — Z01.818 PREOP GENERAL PHYSICAL EXAM: ICD-10-CM

## 2025-02-26 DIAGNOSIS — D68.59 PROTEIN S DEFICIENCY: ICD-10-CM

## 2025-02-26 DIAGNOSIS — G44.229 CHRONIC TENSION-TYPE HEADACHE, NOT INTRACTABLE: Primary | ICD-10-CM

## 2025-02-26 PROBLEM — F41.9 ANXIETY DISORDER: Status: ACTIVE | Noted: 2025-02-26

## 2025-02-26 PROBLEM — G93.32 CHRONIC FATIGUE SYNDROME: Status: ACTIVE | Noted: 2025-02-26

## 2025-02-26 PROBLEM — K58.9 IRRITABLE BOWEL SYNDROME: Status: ACTIVE | Noted: 2025-02-26

## 2025-02-26 LAB
ERYTHROCYTE [DISTWIDTH] IN BLOOD BY AUTOMATED COUNT: 11.8 % (ref 10–15)
HCT VFR BLD AUTO: 41.8 % (ref 35–47)
HGB BLD-MCNC: 13.9 G/DL (ref 11.7–15.7)
MCH RBC QN AUTO: 31.5 PG (ref 26.5–33)
MCHC RBC AUTO-ENTMCNC: 33.3 G/DL (ref 31.5–36.5)
MCV RBC AUTO: 95 FL (ref 78–100)
PLATELET # BLD AUTO: 284 10E3/UL (ref 150–450)
RBC # BLD AUTO: 4.41 10E6/UL (ref 3.8–5.2)
WBC # BLD AUTO: 9.3 10E3/UL (ref 4–11)

## 2025-02-26 SDOH — HEALTH STABILITY: PHYSICAL HEALTH: ON AVERAGE, HOW MANY DAYS PER WEEK DO YOU ENGAGE IN MODERATE TO STRENUOUS EXERCISE (LIKE A BRISK WALK)?: 5 DAYS

## 2025-02-26 ASSESSMENT — SOCIAL DETERMINANTS OF HEALTH (SDOH): HOW OFTEN DO YOU GET TOGETHER WITH FRIENDS OR RELATIVES?: ONCE A WEEK

## 2025-02-26 NOTE — ASSESSMENT & PLAN NOTE
CRC: Last diagnostic colonoscopy in 2014 -had been recommended for 10-year follow-up  Annual mammogram began  Pap/pelvic -follows with GYN  Significant family history for underlying diabetes

## 2025-02-26 NOTE — ASSESSMENT & PLAN NOTE
Prior extensive diagnostic workup without clear reversible etiology  -Highly correlates with anxieties and mood  -No amelioration FODMAP or elimination diets

## 2025-02-26 NOTE — PATIENT INSTRUCTIONS
[facility]  - recommended annual TSH, free T4, thyroid US (last TSH was normal in 12/2024)    No required medication changes for planned procedure  Watch for post-operative symptoms for potential DVT/blood clots

## 2025-02-26 NOTE — PROGRESS NOTES
Preoperative Evaluation  [facility]  [address] [address]  Phone: [phone]  Fax: [phone]  Primary Provider: Artemio Dawn MD  Pre-op Performing Provider: Artemio Dawn MD  Feb 26, 2025 2/26/2025   Surgical Information   What procedure is being done? ROBOTIC TOTAL LAPAROSCOPIC HYSTERECTOMY BILATERAL SALPINGECTOMY    Facility or Hospital where procedure/surgery will be performed: [facility]    Who is doing the procedure / surgery? Trey Pederson MD    Date of surgery / procedure: 3/26/2025    Time of surgery / procedure: TBD    Where do you plan to recover after surgery? at home with family        Proxy-reported     Fax number for surgical facility: Note does not need to be faxed, will be available electronically in Epic.    Assessment & Plan     The proposed surgical procedure is considered INTERMEDIATE risk.    Problem List Items Addressed This Visit          Nervous and Auditory    Chronic tension headache - Primary     - migraines - doing well with botox  - on maintenance valproate - discussions of future weaning            Digestive    Irritable bowel syndrome with both constipation and diarrhea     Prior extensive diagnostic workup without clear reversible etiology  -Highly correlates with anxieties and mood  -No amelioration FODMAP or elimination diets            Endocrine    Thyroid nodule     '24: Assessment with endocrinology   -Recommendations for 1 year follow-up thyroid ultrasound, TSH free T4  -Strong family history of thyroid disease including both Hashimoto's and Graves' disease            Hematologic    Protein S deficiency     Prior DVT post-operatively (ACL repair) at age 11  - Hypercoagulable workup notable for protein S deficiency            Other    Health care maintenance     CRC: Last diagnostic colonoscopy in 2014 -had been recommended for 10-year follow-up  Annual mammogram began  Pap/pelvic -follows  with GYN  Significant family history for underlying diabetes          Other Visit Diagnoses       Preop general physical exam        Relevant Orders    CBC with platelets    Menorrhagia with irregular cycle                        - No identified additional risk factors other than previously addressed     No required medication changes for planned procedure    Recommendation  Approval given to proceed with proposed procedure, without further diagnostic evaluation.    Sherrill Haile is a 44 year old, presenting for the following: Presents for preoperative assessment before planned hysterectomy.  Longstanding history of menorrhagia -prior attempts at endometrial ablation, oral hormone therapy -none of which unfortunately been successful.  Remote history of DVT back in childhood after ACL repair -diagnosed with a protein S deficiency.  No other VTE complications.  Longstanding history of chronic tension headaches -well responsive to Botox; has been on valproate though there have been talks about stopping this in the future.  Stable baseline IBS features -has been advised to pursue 10-year follow-up colonoscopy via GI  Pre-Op Exam          2/26/2025     9:52 AM   Additional Questions   Roomed by Ashleigh CHAPPELL related to upcoming procedure:         2/26/2025   Pre-Op Questionnaire   Have you ever had a heart attack or stroke? No    Have you ever had surgery on your heart or blood vessels, such as a stent placement, a coronary artery bypass, or surgery on an artery in your head, neck, heart, or legs? No    Do you have chest pain with activity? No    Do you have a history of heart failure? No    Do you currently have a cold, bronchitis or symptoms of other infection? No    Do you have a cough, shortness of breath, or wheezing? No    Do you or anyone in your family have previous history of blood clots? (!) YES - post knee surgery DVT age 11    Do you or does anyone in your family have a serious bleeding problem  such as prolonged bleeding following surgeries or cuts? No    Have you ever had problems with anemia or been told to take iron pills? No    Have you had any abnormal blood loss such as black, tarry or bloody stools, or abnormal vaginal bleeding? (!) YES - menorrhagia    Have you ever had a blood transfusion? No    Are you willing to have a blood transfusion if it is medically needed before, during, or after your surgery? Yes    Have you or any of your relatives ever had problems with anesthesia? No    Do you have sleep apnea, excessive snoring or daytime drowsiness? No    Do you have any artifical heart valves or other implanted medical devices like a pacemaker, defibrillator, or continuous glucose monitor? No    Do you have artificial joints? No    Are you allergic to latex? No        Proxy-reported     6}      Patient Active Problem List    Diagnosis Date Noted    CARDIOVASCULAR SCREENING; LDL GOAL LESS THAN 160 05/09/2010     Priority: Medium    Chronic tension headache      Priority: Medium    Embolism and thrombosis (H)      Priority: Medium     postop from knee surgery twice, neg clotting workup X2  Already had consult with MFM and recc daily Lovenox with pregnancy, started 9/15/06  Juliana following for probable protein S deficiency--needs lovenox with all pregnancies    **Juliana wants to be notified when gives birth to check on patient and adjust medications, plans lovenox 40mg daily to start 24 hours after delivery.  Problem list name updated by automated process. Provider to review      Insomnia 04/05/2006     Priority: Medium     Problem list name updated by automated process. Provider to review      DEPRESSION ACUTE - SINGLE EPISODE( Moderate) 12/28/2005     Priority: Medium    Female infertility associated with anovulation 07/25/2005     Priority: Medium     Spontaneous pregnancy        Past Medical History:   Diagnosis Date    Chronic tension headache     Depressive disorder, not elsewhere  "classified 12/05-8/06    started on Zoloft/marques kenia on hennepin    Embolism and thrombosis of unspecified site     postop from knee surgery, neg clotting workup X2    Other blood disease     Protein S deficiency    Pregnancy with history of infertility     spontaneous conception     Past Surgical History:   Procedure Laterality Date    HC HYSTEROSALPINGOGRAM  1/19/06    tubes open     KNEE SCOPE, DIAGNOSTIC  16 y/o    Arthroscopy, Knee    [facility] NONSPECIFIC PROCEDURE  1986    appy    [facility] NONSPECIFIC PROCEDURE      knee surgery X 2    [facility] NONSPECIFIC PROCEDURE  1981    right hernia repair     Current Outpatient Medications   Medication Sig Dispense Refill    Relugolix-Estradiol-Norethind (MYFEMBREE) 40-1-0.5 MG TABS Take 1 tablet by mouth daily.      rizatriptan (MAXALT) 10 MG tablet Take 5 mg by mouth at onset of headache.      valproic acid (DEPAKENE) 250 MG capsule Take 250 mg by mouth daily      fluconazole (DIFLUCAN) 150 MG tablet Take now and if still symptomatic in 48 hours, repeat 2 tablet 0       Allergies   Allergen Reactions    Coumadin [Warfarin And Related]      At high doses had hair loss    Duloxetine Hcl Fatigue        Social History     Tobacco Use    Smoking status: Never     Passive exposure: Never    Smokeless tobacco: Never   Substance Use Topics    Alcohol use: Yes     Comment: 1 x per month             Review of Systems  Constitutional, HEENT, cardiovascular, pulmonary, gi and gu systems are negative, except as otherwise noted.    Objective    BP 95/65   Pulse 72   Temp 98.1  F (36.7  C)   Resp 18   Ht 1.6 m (5' 3\")   Wt 58.5 kg (129 lb)   LMP  (LMP Unknown)   SpO2 100%   BMI 22.85 kg/m     Estimated body mass index is 22.85 kg/m  as calculated from the following:    Height as of this encounter: 1.6 m (5' 3\").    Weight as of this encounter: 58.5 kg (129 lb).  Physical Exam  GENERAL: alert and no distress  NECK: no adenopathy, no asymmetry, masses, or scars  RESP: lungs clear to " auscultation - no rales, rhonchi or wheezes  CV: regular rate and rhythm, normal S1 S2, no S3 or S4, no murmur, click or rub, no peripheral edema  ABDOMEN: soft, nontender, no hepatosplenomegaly, no masses and bowel sounds normal  MS: no gross musculoskeletal defects noted, no edema    Recent Labs   Lab Test 12/08/24  1026   HGB 13.4         POTASSIUM 4.5   CR 0.74        Diagnostics  Labs pending at this time.  Results will be reviewed when available.   No EKG required for low risk surgery (cataract, skin procedure, breast biopsy, etc).    Revised Cardiac Risk Index (RCRI)  The patient has the following serious cardiovascular risks for perioperative complications:   - No serious cardiac risks = 0 points     RCRI Interpretation: 0 points: Class I (very low risk - 0.4% complication rate)         Signed Electronically by: Artemio Dawn MD  A copy of this evaluation report is provided to the requesting physician.         Answers submitted by the patient for this visit:  Patient Health Questionnaire (Submitted on 2/26/2025)  If you checked off any problems, how difficult have these problems made it for you to do your work, take care of things at home, or get along with other people?: Not difficult at all  PHQ9 TOTAL SCORE: 0

## 2025-02-26 NOTE — ASSESSMENT & PLAN NOTE
'24: Assessment with endocrinology   -Recommendations for 1 year follow-up thyroid ultrasound, TSH free T4  -Strong family history of thyroid disease including both Hashimoto's and Graves' disease

## 2025-02-26 NOTE — ASSESSMENT & PLAN NOTE
Prior DVT post-operatively (ACL repair) at age 11  - Hypercoagulable workup notable for protein S deficiency

## 2025-03-25 ENCOUNTER — ANESTHESIA EVENT (OUTPATIENT)
Dept: SURGERY | Facility: HOSPITAL | Age: 45
End: 2025-03-25
Payer: COMMERCIAL

## 2025-03-25 NOTE — ANESTHESIA PREPROCEDURE EVALUATION
Anesthesia Pre-Procedure Evaluation    Patient: Lazara Bahena   MRN: [id number] : 1980        Procedure : Procedure(s):  ROBOTIC TOTAL LAPAROSCOPIC HYSTERECTOMY BILATERAL SALPINGECTOMY          Past Medical History:   Diagnosis Date    Chronic tension headache     Depressive disorder, not elsewhere classified -    started on Zoloft/marques kenia on hennepin    Embolism and thrombosis of unspecified site     postop from knee surgery, neg clotting workup X2    Insomnia     Irritable bowel syndrome with both constipation and diarrhea     Other blood disease     Protein S deficiency    Pregnancy with history of infertility     spontaneous conception    Protein S deficiency     Thyroid nodule       Past Surgical History:   Procedure Laterality Date    COLONOSCOPY      HC HYSTEROSALPINGOGRAM  2006    tubes open    HC KNEE SCOPE, DIAGNOSTIC  14 y/o    Arthroscopy, Knee    Z NONSPECIFIC PROCEDURE  1986    appy    Z NONSPECIFIC PROCEDURE      knee surgery X 2    ZZ NONSPECIFIC PROCEDURE  1981    right hernia repair      Allergies   Allergen Reactions    Coumadin [Warfarin And Related]      At high doses had hair loss    Duloxetine Hcl Fatigue      Social History     Tobacco Use    Smoking status: Never     Passive exposure: Never    Smokeless tobacco: Never   Substance Use Topics    Alcohol use: Yes     Comment: 1 x per month      Wt Readings from Last 1 Encounters:   25 58.5 kg (129 lb)        Anesthesia Evaluation            ROS/MED HX  ENT/Pulmonary:       Neurologic:       Cardiovascular:       METS/Exercise Tolerance:     Hematologic: Comments: Protein S deficiency    (+) History of blood clots,    pt is not anticoagulated,           Musculoskeletal:       GI/Hepatic:       Renal/Genitourinary:       Endo:       Psychiatric/Substance Use:     (+) psychiatric history depression       Infectious Disease:       Malignancy:       Other:            Physical Exam    Airway         Mallampati: II    Neck ROM: full     Respiratory Devices and Support         Dental       (+) Completely normal teeth      Cardiovascular   cardiovascular exam normal          Pulmonary   pulmonary exam normal                OUTSIDE LABS:  CBC:   Lab Results   Component Value Date    WBC 9.3 02/26/2025    WBC 13.4 (H) 12/08/2024    HGB 13.9 02/26/2025    HGB 13.4 12/08/2024    HCT 41.8 02/26/2025    HCT 41.3 12/08/2024     02/26/2025     12/08/2024     BMP:   Lab Results   Component Value Date     12/08/2024     07/21/2021    POTASSIUM 4.5 12/08/2024    POTASSIUM 4.3 07/21/2021    CHLORIDE 105 12/08/2024    CHLORIDE 102 07/21/2021    CO2 26 12/08/2024    CO2 24 07/21/2021    BUN 14.7 12/08/2024    BUN 16 07/21/2021    CR 0.74 12/08/2024    CR 0.85 07/21/2021     (H) 12/08/2024    GLC 77 07/21/2021     COAGS:   Lab Results   Component Value Date    PTT 25 11/06/2006    INR 0.91 11/06/2006     POC:   Lab Results   Component Value Date    HCG Negative 12/08/2024     HEPATIC:   Lab Results   Component Value Date    ALBUMIN 4.6 02/12/2009    PROTTOTAL 8.0 02/12/2009    ALT 8 02/12/2009    AST 23 02/12/2009    ALKPHOS 55 02/12/2009    BILITOTAL 0.5 02/12/2009     OTHER:   Lab Results   Component Value Date    RENETTA 9.1 12/08/2024    MAG 1.8 06/28/2008    LIPASE 36 05/29/2009    AMYLASE 51 06/28/2008    TSH 1.06 12/08/2024    T4 1.11 05/29/2009    T3 125 05/29/2009    CRP 2.0 07/21/2021    SED 2 07/21/2021       Anesthesia Plan    ASA Status:  2    NPO Status:  NPO Appropriate    Anesthesia Type: General.     - Airway: ETT   Induction: Intravenous, Propofol.   Maintenance: Inhalation.        Consents    Anesthesia Plan(s) and associated risks, benefits, and realistic alternatives discussed. Questions answered and patient/representative(s) expressed understanding.     - Discussed: Risks, Benefits and Alternatives for BOTH SEDATION and the PROCEDURE were discussed     - Discussed with:   Patient            Postoperative Care    Pain management: IV analgesics, Oral pain medications.   PONV prophylaxis: Ondansetron (or other 5HT-3), Dexamethasone or Solumedrol     Comments:               Danial Lennon MD    Clinically Significant Risk Factors Present on Admission

## 2025-03-26 ENCOUNTER — ANESTHESIA (OUTPATIENT)
Dept: SURGERY | Facility: HOSPITAL | Age: 45
End: 2025-03-26
Payer: COMMERCIAL

## 2025-03-26 ENCOUNTER — HOSPITAL ENCOUNTER (OUTPATIENT)
Facility: HOSPITAL | Age: 45
Discharge: HOME | End: 2025-03-26
Attending: OBSTETRICS & GYNECOLOGY | Admitting: OBSTETRICS & GYNECOLOGY
Payer: COMMERCIAL

## 2025-03-26 VITALS
TEMPERATURE: 97.9 F | HEART RATE: 68 BPM | DIASTOLIC BLOOD PRESSURE: 65 MMHG | OXYGEN SATURATION: 100 % | RESPIRATION RATE: 16 BRPM | BODY MASS INDEX: 22.67 KG/M2 | WEIGHT: 128 LBS | SYSTOLIC BLOOD PRESSURE: 111 MMHG

## 2025-03-26 DIAGNOSIS — N80.9 ENDOMETRIOSIS: Primary | ICD-10-CM

## 2025-03-26 LAB
ABO + RH BLD: NORMAL
BASOPHILS # BLD AUTO: 0 10E3/UL (ref 0–0.2)
BASOPHILS NFR BLD AUTO: 1 %
BLD GP AB SCN SERPL QL: NEGATIVE
EOSINOPHIL # BLD AUTO: 0.3 10E3/UL (ref 0–0.7)
EOSINOPHIL NFR BLD AUTO: 3 %
ERYTHROCYTE [DISTWIDTH] IN BLOOD BY AUTOMATED COUNT: 12.3 % (ref 10–15)
HCG UR QL: NEGATIVE
HCT VFR BLD AUTO: 38.3 % (ref 35–47)
HGB BLD-MCNC: 12.4 G/DL (ref 11.7–15.7)
IMM GRANULOCYTES # BLD: 0 10E3/UL
IMM GRANULOCYTES NFR BLD: 1 %
LYMPHOCYTES # BLD AUTO: 2.3 10E3/UL (ref 0.8–5.3)
LYMPHOCYTES NFR BLD AUTO: 27 %
MCH RBC QN AUTO: 31.2 PG (ref 26.5–33)
MCHC RBC AUTO-ENTMCNC: 32.4 G/DL (ref 31.5–36.5)
MCV RBC AUTO: 97 FL (ref 78–100)
MONOCYTES # BLD AUTO: 0.7 10E3/UL (ref 0–1.3)
MONOCYTES NFR BLD AUTO: 8 %
NEUTROPHILS # BLD AUTO: 5.2 10E3/UL (ref 1.6–8.3)
NEUTROPHILS NFR BLD AUTO: 61 %
NRBC # BLD AUTO: 0 10E3/UL
NRBC BLD AUTO-RTO: 0 /100
PLATELET # BLD AUTO: 245 10E3/UL (ref 150–450)
RBC # BLD AUTO: 3.97 10E6/UL (ref 3.8–5.2)
SPECIMEN EXP DATE BLD: NORMAL
WBC # BLD AUTO: 8.6 10E3/UL (ref 4–11)

## 2025-03-26 PROCEDURE — 86850 RBC ANTIBODY SCREEN: CPT | Performed by: NURSE PRACTITIONER

## 2025-03-26 PROCEDURE — 250N000011 HC RX IP 250 OP 636: Performed by: STUDENT IN AN ORGANIZED HEALTH CARE EDUCATION/TRAINING PROGRAM

## 2025-03-26 PROCEDURE — 85018 HEMOGLOBIN: CPT | Performed by: NURSE PRACTITIONER

## 2025-03-26 PROCEDURE — 250N000025 HC SEVOFLURANE, PER MIN: Performed by: OBSTETRICS & GYNECOLOGY

## 2025-03-26 PROCEDURE — 370N000017 HC ANESTHESIA TECHNICAL FEE, PER MIN: Performed by: OBSTETRICS & GYNECOLOGY

## 2025-03-26 PROCEDURE — 250N000011 HC RX IP 250 OP 636: Mod: JZ | Performed by: NURSE PRACTITIONER

## 2025-03-26 PROCEDURE — 86900 BLOOD TYPING SEROLOGIC ABO: CPT | Performed by: NURSE PRACTITIONER

## 2025-03-26 PROCEDURE — 85004 AUTOMATED DIFF WBC COUNT: CPT | Performed by: NURSE PRACTITIONER

## 2025-03-26 PROCEDURE — 999N000141 HC STATISTIC PRE-PROCEDURE NURSING ASSESSMENT: Performed by: OBSTETRICS & GYNECOLOGY

## 2025-03-26 PROCEDURE — 250N000013 HC RX MED GY IP 250 OP 250 PS 637: Performed by: OBSTETRICS & GYNECOLOGY

## 2025-03-26 PROCEDURE — 250N000009 HC RX 250: Performed by: STUDENT IN AN ORGANIZED HEALTH CARE EDUCATION/TRAINING PROGRAM

## 2025-03-26 PROCEDURE — 258N000003 HC RX IP 258 OP 636: Performed by: PAIN MEDICINE

## 2025-03-26 PROCEDURE — 710N000012 HC RECOVERY PHASE 2, PER MINUTE: Performed by: OBSTETRICS & GYNECOLOGY

## 2025-03-26 PROCEDURE — 710N000009 HC RECOVERY PHASE 1, LEVEL 1, PER MIN: Performed by: OBSTETRICS & GYNECOLOGY

## 2025-03-26 PROCEDURE — 36415 COLL VENOUS BLD VENIPUNCTURE: CPT | Performed by: NURSE PRACTITIONER

## 2025-03-26 PROCEDURE — 81025 URINE PREGNANCY TEST: CPT | Performed by: NURSE PRACTITIONER

## 2025-03-26 PROCEDURE — 250N000013 HC RX MED GY IP 250 OP 250 PS 637: Performed by: NURSE PRACTITIONER

## 2025-03-26 PROCEDURE — 360N000080 HC SURGERY LEVEL 7, PER MIN: Performed by: OBSTETRICS & GYNECOLOGY

## 2025-03-26 PROCEDURE — 258N000003 HC RX IP 258 OP 636: Performed by: STUDENT IN AN ORGANIZED HEALTH CARE EDUCATION/TRAINING PROGRAM

## 2025-03-26 PROCEDURE — 88307 TISSUE EXAM BY PATHOLOGIST: CPT | Mod: TC | Performed by: OBSTETRICS & GYNECOLOGY

## 2025-03-26 PROCEDURE — 88307 TISSUE EXAM BY PATHOLOGIST: CPT | Mod: 26 | Performed by: PATHOLOGY

## 2025-03-26 PROCEDURE — 250N000011 HC RX IP 250 OP 636: Mod: JW | Performed by: OBSTETRICS & GYNECOLOGY

## 2025-03-26 PROCEDURE — 272N000001 HC OR GENERAL SUPPLY STERILE: Performed by: OBSTETRICS & GYNECOLOGY

## 2025-03-26 RX ORDER — DEXAMETHASONE SODIUM PHOSPHATE 4 MG/ML
INJECTION, SOLUTION INTRA-ARTICULAR; INTRALESIONAL; INTRAMUSCULAR; INTRAVENOUS; SOFT TISSUE PRN
Status: DISCONTINUED | OUTPATIENT
Start: 2025-03-26 | End: 2025-03-26

## 2025-03-26 RX ORDER — ONDANSETRON 4 MG/1
4 TABLET, ORALLY DISINTEGRATING ORAL EVERY 30 MIN PRN
Status: DISCONTINUED | OUTPATIENT
Start: 2025-03-26 | End: 2025-03-26 | Stop reason: HOSPADM

## 2025-03-26 RX ORDER — ONDANSETRON 2 MG/ML
4 INJECTION INTRAMUSCULAR; INTRAVENOUS EVERY 30 MIN PRN
Status: DISCONTINUED | OUTPATIENT
Start: 2025-03-26 | End: 2025-03-26 | Stop reason: HOSPADM

## 2025-03-26 RX ORDER — OXYCODONE HYDROCHLORIDE 5 MG/1
5-10 TABLET ORAL EVERY 4 HOURS PRN
Qty: 12 TABLET | Refills: 0 | Status: SHIPPED | OUTPATIENT
Start: 2025-03-26 | End: 2025-04-03

## 2025-03-26 RX ORDER — FENTANYL CITRATE 50 UG/ML
50 INJECTION, SOLUTION INTRAMUSCULAR; INTRAVENOUS EVERY 5 MIN PRN
Status: DISCONTINUED | OUTPATIENT
Start: 2025-03-26 | End: 2025-03-26 | Stop reason: HOSPADM

## 2025-03-26 RX ORDER — DEXAMETHASONE SODIUM PHOSPHATE 10 MG/ML
4 INJECTION, SOLUTION INTRAMUSCULAR; INTRAVENOUS
Status: DISCONTINUED | OUTPATIENT
Start: 2025-03-26 | End: 2025-03-26 | Stop reason: HOSPADM

## 2025-03-26 RX ORDER — PROPOFOL 10 MG/ML
INJECTION, EMULSION INTRAVENOUS PRN
Status: DISCONTINUED | OUTPATIENT
Start: 2025-03-26 | End: 2025-03-26

## 2025-03-26 RX ORDER — SODIUM CHLORIDE, SODIUM LACTATE, POTASSIUM CHLORIDE, CALCIUM CHLORIDE 600; 310; 30; 20 MG/100ML; MG/100ML; MG/100ML; MG/100ML
INJECTION, SOLUTION INTRAVENOUS CONTINUOUS
Status: DISCONTINUED | OUTPATIENT
Start: 2025-03-26 | End: 2025-03-26 | Stop reason: HOSPADM

## 2025-03-26 RX ORDER — CEFAZOLIN SODIUM/WATER 2 G/20 ML
2 SYRINGE (ML) INTRAVENOUS SEE ADMIN INSTRUCTIONS
Status: DISCONTINUED | OUTPATIENT
Start: 2025-03-26 | End: 2025-03-26 | Stop reason: HOSPADM

## 2025-03-26 RX ORDER — OXYCODONE HYDROCHLORIDE 5 MG/1
10 TABLET ORAL
Status: DISCONTINUED | OUTPATIENT
Start: 2025-03-26 | End: 2025-03-26 | Stop reason: HOSPADM

## 2025-03-26 RX ORDER — ACETAMINOPHEN 325 MG/1
975 TABLET ORAL ONCE
Status: COMPLETED | OUTPATIENT
Start: 2025-03-26 | End: 2025-03-26

## 2025-03-26 RX ORDER — OXYCODONE HYDROCHLORIDE 5 MG/1
5 TABLET ORAL
Status: DISCONTINUED | OUTPATIENT
Start: 2025-03-26 | End: 2025-03-26 | Stop reason: HOSPADM

## 2025-03-26 RX ORDER — IBUPROFEN 200 MG
800 TABLET ORAL ONCE
Status: COMPLETED | OUTPATIENT
Start: 2025-03-26 | End: 2025-03-26

## 2025-03-26 RX ORDER — LIDOCAINE HYDROCHLORIDE 10 MG/ML
INJECTION, SOLUTION INFILTRATION; PERINEURAL PRN
Status: DISCONTINUED | OUTPATIENT
Start: 2025-03-26 | End: 2025-03-26

## 2025-03-26 RX ORDER — HYDROMORPHONE HCL IN WATER/PF 6 MG/30 ML
0.2 PATIENT CONTROLLED ANALGESIA SYRINGE INTRAVENOUS EVERY 5 MIN PRN
Status: DISCONTINUED | OUTPATIENT
Start: 2025-03-26 | End: 2025-03-26 | Stop reason: HOSPADM

## 2025-03-26 RX ORDER — MEDROXYPROGESTERONE ACETATE 5 MG
5 TABLET ORAL DAILY
Status: ON HOLD | COMMUNITY
End: 2025-03-26

## 2025-03-26 RX ORDER — ENOXAPARIN SODIUM 100 MG/ML
30 INJECTION SUBCUTANEOUS EVERY 24 HOURS
Qty: 9 ML | Refills: 0 | Status: SHIPPED | OUTPATIENT
Start: 2025-03-26 | End: 2025-04-25

## 2025-03-26 RX ORDER — NALOXONE HYDROCHLORIDE 0.4 MG/ML
0.1 INJECTION, SOLUTION INTRAMUSCULAR; INTRAVENOUS; SUBCUTANEOUS
Status: DISCONTINUED | OUTPATIENT
Start: 2025-03-26 | End: 2025-03-26 | Stop reason: HOSPADM

## 2025-03-26 RX ORDER — LIDOCAINE 40 MG/G
CREAM TOPICAL
Status: DISCONTINUED | OUTPATIENT
Start: 2025-03-26 | End: 2025-03-26 | Stop reason: HOSPADM

## 2025-03-26 RX ORDER — HYDROMORPHONE HCL IN WATER/PF 6 MG/30 ML
0.4 PATIENT CONTROLLED ANALGESIA SYRINGE INTRAVENOUS EVERY 5 MIN PRN
Status: DISCONTINUED | OUTPATIENT
Start: 2025-03-26 | End: 2025-03-26 | Stop reason: HOSPADM

## 2025-03-26 RX ORDER — ACETAMINOPHEN 325 MG/1
975 TABLET ORAL ONCE
Status: DISCONTINUED | OUTPATIENT
Start: 2025-03-26 | End: 2025-03-26 | Stop reason: HOSPADM

## 2025-03-26 RX ORDER — FENTANYL CITRATE 50 UG/ML
25 INJECTION, SOLUTION INTRAMUSCULAR; INTRAVENOUS EVERY 5 MIN PRN
Status: DISCONTINUED | OUTPATIENT
Start: 2025-03-26 | End: 2025-03-26 | Stop reason: HOSPADM

## 2025-03-26 RX ORDER — DEXAMETHASONE SODIUM PHOSPHATE 4 MG/ML
4 INJECTION, SOLUTION INTRA-ARTICULAR; INTRALESIONAL; INTRAMUSCULAR; INTRAVENOUS; SOFT TISSUE
Status: DISCONTINUED | OUTPATIENT
Start: 2025-03-26 | End: 2025-03-26 | Stop reason: HOSPADM

## 2025-03-26 RX ORDER — METRONIDAZOLE 500 MG/100ML
500 INJECTION, SOLUTION INTRAVENOUS
Status: COMPLETED | OUTPATIENT
Start: 2025-03-26 | End: 2025-03-26

## 2025-03-26 RX ORDER — CEFAZOLIN SODIUM/WATER 2 G/20 ML
2 SYRINGE (ML) INTRAVENOUS
Status: DISCONTINUED | OUTPATIENT
Start: 2025-03-26 | End: 2025-03-26 | Stop reason: HOSPADM

## 2025-03-26 RX ORDER — BUPIVACAINE HYDROCHLORIDE 2.5 MG/ML
INJECTION, SOLUTION EPIDURAL; INFILTRATION; INTRACAUDAL; PERINEURAL PRN
Status: DISCONTINUED | OUTPATIENT
Start: 2025-03-26 | End: 2025-03-26 | Stop reason: HOSPADM

## 2025-03-26 RX ORDER — ONDANSETRON 2 MG/ML
INJECTION INTRAMUSCULAR; INTRAVENOUS PRN
Status: DISCONTINUED | OUTPATIENT
Start: 2025-03-26 | End: 2025-03-26

## 2025-03-26 RX ORDER — FENTANYL CITRATE 50 UG/ML
INJECTION, SOLUTION INTRAMUSCULAR; INTRAVENOUS PRN
Status: DISCONTINUED | OUTPATIENT
Start: 2025-03-26 | End: 2025-03-26

## 2025-03-26 RX ADMIN — ONDANSETRON 4 MG: 2 INJECTION INTRAMUSCULAR; INTRAVENOUS at 08:46

## 2025-03-26 RX ADMIN — FENTANYL CITRATE 50 MCG: 50 INJECTION, SOLUTION INTRAMUSCULAR; INTRAVENOUS at 09:38

## 2025-03-26 RX ADMIN — DEXAMETHASONE SODIUM PHOSPHATE 10 MG: 4 INJECTION, SOLUTION INTRA-ARTICULAR; INTRALESIONAL; INTRAMUSCULAR; INTRAVENOUS; SOFT TISSUE at 07:43

## 2025-03-26 RX ADMIN — FENTANYL CITRATE 25 MCG: 50 INJECTION, SOLUTION INTRAMUSCULAR; INTRAVENOUS at 09:27

## 2025-03-26 RX ADMIN — PHENYLEPHRINE HYDROCHLORIDE 100 MCG: 10 INJECTION INTRAVENOUS at 07:55

## 2025-03-26 RX ADMIN — METRONIDAZOLE 500 MG: 500 INJECTION, SOLUTION INTRAVENOUS at 07:11

## 2025-03-26 RX ADMIN — FENTANYL CITRATE 50 MCG: 50 INJECTION, SOLUTION INTRAMUSCULAR; INTRAVENOUS at 08:57

## 2025-03-26 RX ADMIN — SUGAMMADEX 150 MG: 100 INJECTION, SOLUTION INTRAVENOUS at 08:57

## 2025-03-26 RX ADMIN — PROPOFOL 130 MG: 10 INJECTION, EMULSION INTRAVENOUS at 07:43

## 2025-03-26 RX ADMIN — ONDANSETRON 4 MG: 2 INJECTION, SOLUTION INTRAMUSCULAR; INTRAVENOUS at 12:11

## 2025-03-26 RX ADMIN — FENTANYL CITRATE 50 MCG: 50 INJECTION, SOLUTION INTRAMUSCULAR; INTRAVENOUS at 07:43

## 2025-03-26 RX ADMIN — PHENYLEPHRINE HYDROCHLORIDE 100 MCG: 10 INJECTION INTRAVENOUS at 08:01

## 2025-03-26 RX ADMIN — MIDAZOLAM HYDROCHLORIDE 2 MG: 1 INJECTION, SOLUTION INTRAMUSCULAR; INTRAVENOUS at 07:30

## 2025-03-26 RX ADMIN — LIDOCAINE HYDROCHLORIDE 5 ML: 10 INJECTION, SOLUTION INFILTRATION; PERINEURAL at 07:43

## 2025-03-26 RX ADMIN — PHENYLEPHRINE HYDROCHLORIDE 100 MCG: 10 INJECTION INTRAVENOUS at 08:40

## 2025-03-26 RX ADMIN — HYDROMORPHONE HYDROCHLORIDE 0.5 MG: 1 INJECTION, SOLUTION INTRAMUSCULAR; INTRAVENOUS; SUBCUTANEOUS at 08:19

## 2025-03-26 RX ADMIN — SODIUM CHLORIDE, SODIUM LACTATE, POTASSIUM CHLORIDE, AND CALCIUM CHLORIDE: .6; .31; .03; .02 INJECTION, SOLUTION INTRAVENOUS at 06:14

## 2025-03-26 RX ADMIN — IBUPROFEN 800 MG: 200 TABLET, FILM COATED ORAL at 11:25

## 2025-03-26 RX ADMIN — ACETAMINOPHEN 975 MG: 325 TABLET ORAL at 06:06

## 2025-03-26 RX ADMIN — ROCURONIUM 40 MG: 50 INJECTION, SOLUTION INTRAVENOUS at 07:43

## 2025-03-26 ASSESSMENT — ACTIVITIES OF DAILY LIVING (ADL)
ADLS_ACUITY_SCORE: 32

## 2025-03-26 NOTE — H&P
History and Physical Update    I have examined the patient and reviewed the history and physical that is present on this chart. The changes in the patient's history and physical condition are as follows:    None    Trey Pederson MD

## 2025-03-26 NOTE — OP NOTE
I was asked by Dr. Pederson  to assist him in positioning the pt, prepping and draping the patient as well as providing retraction of critical structures during the case.  My assistance made it possible for Dr. Pederson to complete the surgery with efficiency, thus minimizing blood loss and anesthesia time. I assisted in providing hemostasis, and closure as well.

## 2025-03-26 NOTE — ANESTHESIA POSTPROCEDURE EVALUATION
Patient: Lazara Bahena    Procedure: Procedure(s):  ROBOTIC TOTAL LAPAROSCOPIC HYSTERECTOMY BILATERAL SALPINGECTOMY       Anesthesia Type:  General    Note:  Disposition: Outpatient   Postop Pain Control: Uneventful            Sign Out: Well controlled pain   PONV: No   Neuro/Psych: Uneventful            Sign Out: Acceptable/Baseline neuro status   Airway/Respiratory: Uneventful            Sign Out: Acceptable/Baseline resp. status   CV/Hemodynamics: Uneventful            Sign Out: Acceptable CV status; No obvious hypovolemia; No obvious fluid overload   Other NRE: NONE   DID A NON-ROUTINE EVENT OCCUR? No           Last vitals:  Vitals Value Taken Time   /68 03/26/25 1000   Temp 36.4  C (97.52  F) 03/26/25 1006   Pulse 72 03/26/25 1012   Resp 23 03/26/25 1002   SpO2 97 % 03/26/25 1012   Vitals shown include unfiled device data.    Electronically Signed By: Danial Lennon MD  March 26, 2025  2:31 PM

## 2025-03-26 NOTE — ANESTHESIA CARE TRANSFER NOTE
Patient: Lazara Bahena    Procedure: Procedure(s):  ROBOTIC TOTAL LAPAROSCOPIC HYSTERECTOMY BILATERAL SALPINGECTOMY       Diagnosis: Menometrorrhagia [N92.1]  Diagnosis Additional Information: No value filed.    Anesthesia Type:   General     Note:    Oropharynx: oropharynx clear of all foreign objects and spontaneously breathing  Level of Consciousness: awake  Oxygen Supplementation: face mask  Level of Supplemental Oxygen (L/min / FiO2): 6  Independent Airway: airway patency satisfactory and stable  Dentition: dentition unchanged  Vital Signs Stable: post-procedure vital signs reviewed and stable  Report to RN Given: handoff report given  Patient transferred to: PACU    Handoff Report: Identifed the Patient, Identified the Reponsible Provider, Reviewed the pertinent medical history, Discussed the surgical course, Reviewed Intra-OP anesthesia mangement and issues during anesthesia, Set expectations for post-procedure period and Allowed opportunity for questions and acknowledgement of understanding      Vitals:  Vitals Value Taken Time   /61 03/26/25 0901   Temp 36.2  C (97.16  F) 03/26/25 0902   Pulse 78 03/26/25 0902   Resp 19 03/26/25 0902   SpO2 100 % 03/26/25 0902   Vitals shown include unfiled device data.    Electronically Signed By: WAYNE Mariano CRNA  March 26, 2025  9:04 AM

## 2025-03-26 NOTE — ANESTHESIA PROCEDURE NOTES
Airway       Patient location during procedure: OR       Procedure Start/Stop Times: 3/26/2025 7:47 AM  Staff -        CRNA: Janina Harp APRN CRNA       Performed By: CRNAIndications and Patient Condition       Indications for airway management: donovan-procedural       Induction type:intravenous       Mask difficulty assessment: 1 - vent by mask    Final Airway Details       Final airway type: endotracheal airway       Successful airway: ETT - single  Endotracheal Airway Details        ETT size (mm): 7.0       Cuffed: yes       Successful intubation technique: direct laryngoscopy       DL Blade Type: MAC 3       Grade View of Cords: 1       Adjucts: stylet       Position: Right       Measured from: gums/teeth       Secured at (cm): 21       Bite block used: None    Post intubation assessment        Placement verified by: capnometry, equal breath sounds and chest rise        Number of attempts at approach: 1       Number of other approaches attempted: 0       Secured with: tape       Ease of procedure: easy       Dentition: Intact and Unchanged       Dental guard used and removed. Dental Guard Type: Standard White.    Medication(s) Administered   Medication Administration Time: 3/26/2025 7:47 AM

## 2025-03-26 NOTE — OP NOTE
Name:  Lazara Bahena  Record # [id number]   : 1980  Care Provider: Trey Pederson MD   Admit Date:  3/26/2025       Obstetrics Gynecology - Operative Report    DATE OF SERVICE: 3/26/2025     PREOPERATIVE DIAGNOSIS: menorhaggia, pelvic pain, Dysfunctional uterine bleeding, and failed conservative management    PROCEDURE:   Da Des total laparoscopic hysterectomy, bilateral salpingectomy     PHYSICIAN:  Trey Pederson MD     ASSISTANT: La Nena Bajwa CNP    ESTIMATED BLOOD LOSS: 5 ml    ANESTHESIA:  General.    SPECIMENS REMOVED:  Uterus, cervix and bilateral tubes.    COMPLICATIONS:  None noted.    COMMENTS: Lazara Bahena  was met preoperatively with her  where we discussed the procedure and the risks associated with the procedure.  She understood these to be, but not limited to injury to adjacent organs including bladder, bowel, ureter, infection and bleeding.  Patient signed consent and was brought back to the operating room in stable condition.    Patient underwent induction of a general anesthetic, she was carefully prepped and draped for the procedure in sterile fashion. Timeout was performed. Bladder was drained with a Graves catheter, uterine manipulator placed into the uterus.     Attention was turned to the abdomen.  An incision was made above the umbilicus.  A Veress needle was introduced with a 2 pop technique, saline drop test confirmed adequate placement. Opening pressure was 4.   Insufflation was now done until 15mm of pressure were established.  An 11/12 nonbladed trocar was placed above the umbilicus. Two robotic assist ports were place approximately 9-10 cm lateral to this initial incision.  A right upper quadrant 10/11 nonbladed trocar was placed and a right lateral quadrant 5 mm trocar was placed. Trandelenburg assistance was used and the davinci was then brought to the patient s bedside.  The da Des was then docked.  The PK bipolar forceps were placed at the left  da Des port, the monopolar scissors placed in right side of the body and I took my turn to the da Des console.     The procedure began with identifying the normal anatomy.  The uterus appeared enlarged and was mobile.  The tube on the left side was cauterized, transected and removed.  The ovarian ligament on the left side was then cauterized and transected.  The round ligament on the left side was cauterized and transected creating an anterior and posterior leave of the broad ligament. This was carried out in similar fashion on the right side.  The anterior bladder flap was created.  The ureter was then identified and its course where it dives under the uterine vasculature.  This was repeated on the right side.  At this junction, the uterine vasculature on both sides near the uterocervical isthmus were cauterized and transected.  This cauterization and transection was continued along the cervix until the level of the cardinal ligament.  At this junction anterior colpotomy and posterior colpotomy were performed. The uterus, cervix and tubes were then delivered through the vagina.  The vaginal cuff was closed with V-lock suture.  Both angles were elevated to its ipsilateral uterosacral ligament.    At this junction, the procedure  was complete.  Sponge, lap and needle counts were correct x 2.  I took my turn to cystoscopy, noting normal ureter and bladder anatomy. Strong urine efflux bilaterally was noted from bilateral ureteral orfices.    The trocars were removed and the rajesh was removed from the abdomen.  The fascia was closed with umbilical and upper right quadrant port with 0 Vicryl.  Skin was closed with Monocryl. Steri-Strips applied.  She was brought to the recovery in stable condition.      Trey Pederson MD     Cc: Trey Pederson MD  Urogynecology and Reconstructive Pelvic Surgery

## 2025-04-03 ENCOUNTER — OFFICE VISIT (OUTPATIENT)
Dept: FAMILY MEDICINE | Facility: CLINIC | Age: 45
End: 2025-04-03
Payer: COMMERCIAL

## 2025-04-03 VITALS
WEIGHT: 128 LBS | SYSTOLIC BLOOD PRESSURE: 117 MMHG | BODY MASS INDEX: 21.85 KG/M2 | HEIGHT: 64 IN | HEART RATE: 59 BPM | OXYGEN SATURATION: 100 % | DIASTOLIC BLOOD PRESSURE: 77 MMHG | TEMPERATURE: 98.1 F

## 2025-04-03 DIAGNOSIS — Z12.4 CERVICAL CANCER SCREENING: Primary | ICD-10-CM

## 2025-04-03 DIAGNOSIS — F41.8 SITUATIONAL ANXIETY: ICD-10-CM

## 2025-04-03 RX ORDER — CLONAZEPAM 0.5 MG/1
TABLET ORAL
Qty: 20 TABLET | Refills: 1 | Status: SHIPPED | OUTPATIENT
Start: 2025-04-03

## 2025-04-03 RX ORDER — TRAZODONE HYDROCHLORIDE 50 MG/1
50 TABLET ORAL
Qty: 60 TABLET | Refills: 1 | Status: SHIPPED | OUTPATIENT
Start: 2025-04-03

## 2025-04-03 ASSESSMENT — ANXIETY QUESTIONNAIRES
3. WORRYING TOO MUCH ABOUT DIFFERENT THINGS: MORE THAN HALF THE DAYS
6. BECOMING EASILY ANNOYED OR IRRITABLE: SEVERAL DAYS
GAD7 TOTAL SCORE: 12
5. BEING SO RESTLESS THAT IT IS HARD TO SIT STILL: SEVERAL DAYS
GAD7 TOTAL SCORE: 12
1. FEELING NERVOUS, ANXIOUS, OR ON EDGE: MORE THAN HALF THE DAYS
8. IF YOU CHECKED OFF ANY PROBLEMS, HOW DIFFICULT HAVE THESE MADE IT FOR YOU TO DO YOUR WORK, TAKE CARE OF THINGS AT HOME, OR GET ALONG WITH OTHER PEOPLE?: SOMEWHAT DIFFICULT
2. NOT BEING ABLE TO STOP OR CONTROL WORRYING: MORE THAN HALF THE DAYS
4. TROUBLE RELAXING: MORE THAN HALF THE DAYS
7. FEELING AFRAID AS IF SOMETHING AWFUL MIGHT HAPPEN: MORE THAN HALF THE DAYS
GAD7 TOTAL SCORE: 12
7. FEELING AFRAID AS IF SOMETHING AWFUL MIGHT HAPPEN: MORE THAN HALF THE DAYS
IF YOU CHECKED OFF ANY PROBLEMS ON THIS QUESTIONNAIRE, HOW DIFFICULT HAVE THESE PROBLEMS MADE IT FOR YOU TO DO YOUR WORK, TAKE CARE OF THINGS AT HOME, OR GET ALONG WITH OTHER PEOPLE: SOMEWHAT DIFFICULT

## 2025-04-03 ASSESSMENT — ENCOUNTER SYMPTOMS: NERVOUS/ANXIOUS: 1

## 2025-04-03 NOTE — PROGRESS NOTES
Assessment & Plan       Situational anxiety  Surrounding marital stressors  -Working with both individual counselor and couples counseling  -Identifies significant substance use issues involving her spouse  -Had questions specifically about pharmacotherapy for situational stressors.  We did discuss the role of on-demand benzodiazepine use for these particular stressors, specifically flying was identified.  She did provide genetic pharmacodynamics testing, identifying clonazepam is likely better option versus other benzodiazepines.  Reviewed indications and timing of use.  Advised no other concomitant sedative use.  Would advise stimulating use of clonazepam prior to any future travels to assess tolerance of the medication  -Also offered trazodone to be taken on as-needed basis at night to see how it impacts her sleeping abilities  -Consensus to hold off on any daily antidepressant/antianxiety medication at this point    - clonazePAM (KLONOPIN) 0.5 MG tablet; Take 0.5-1 tablet as needed for situational anxiety  - traZODone (DESYREL) 50 MG tablet; Take 1 tablet (50 mg) by mouth nightly as needed for sleep.    25 minutes spent by me on the date of the encounter doing patient visit and documentation     Subjective   Lazara is a 44 year old, presenting for the following health issues: Presents with situational anxiety concerns.  Has been dealing with marital stressors, particular triggers involving around infidelity issues involving her spouse.  She has been participating in both individual and couples counseling -does find individual psychotherapy helpful.  Has been experiencing more situational stressors around this issue, especially with flying -. Develops panic attack like episodes.  Difficulties with sleeping.  Describes agoraphobia tendencies.  Counselor had directed her to discuss possible pharmacotherapy interventions.      4/3/2025    11:42 AM   Additional Questions   Roomed by Jin   Accompanied by N/A  "    Anxiety    History of Present Illness       Mental Health Follow-up:  Patient presents to follow-up on Anxiety.    Patient's anxiety since last visit has been:  Medium  The patient is not having other symptoms associated with anxiety.  Any significant life events: relationship concerns and financial concerns  Patient is feeling anxious or having panic attacks.  Patient has no concerns about alcohol or drug use.   She is taking medications regularly.            Review of Systems  Constitutional, HEENT, cardiovascular, pulmonary, gi and gu systems are negative, except as otherwise noted.      Objective    /77   Pulse 59   Temp 98.1  F (36.7  C)   Ht 1.613 m (5' 3.5\")   Wt 58.1 kg (128 lb)   LMP  (LMP Unknown)   SpO2 100%   BMI 22.32 kg/m    Body mass index is 22.32 kg/m .  Physical Exam               Signed Electronically by: Artemio Dawn MD    "

## 2025-04-07 ENCOUNTER — HOSPITAL ENCOUNTER (OUTPATIENT)
Dept: ULTRASOUND IMAGING | Facility: CLINIC | Age: 45
Discharge: HOME | End: 2025-04-07
Attending: STUDENT IN AN ORGANIZED HEALTH CARE EDUCATION/TRAINING PROGRAM | Admitting: STUDENT IN AN ORGANIZED HEALTH CARE EDUCATION/TRAINING PROGRAM
Payer: COMMERCIAL

## 2025-04-07 DIAGNOSIS — E04.9 GOITER: ICD-10-CM

## 2025-04-07 PROCEDURE — 76536 US EXAM OF HEAD AND NECK: CPT

## 2025-04-09 NOTE — PROGRESS NOTES
"Endocrinology Clinic Visit     Chief Complaint     Chief Complaint   Patient presents with    Thyroid Problem       History of Present Illness     Lazara Bahena is a 44 year old female who is seen for follow up thyroid goiter. Previously saw Dr. Padilla. Pt is here with     Interval History  Worsening obstructive symptoms started about 5 mo ago, problem with swallowing (feeling like something is at the neck area), sensation when laying down, some hoarse voice  Daughter with Grave's and goiter  Mom with Hashimoto's and thyroid nodule s/p surgery    Tired after hysterectomy 3/2025, no ovaries removal  Weight stable  Work out daily    No eye symptoms except for getting older, no eye pain/ double vision    Protein S deficiency, diagnosed age 15 after developing DVT post ACL surgery    Initial visit  She reported fatigue and hair loss since last October 2013.  She felt a lump in her throat when swallowing, no pain or discomfort, people commented that her neck is big. It feels like there is always something in her throat, it doesn't feel smooth. No difficulty swallowing but \"feels more aware of swallowing\". No SOB when lying down.    She was seen by her PCP, tsh ON 11/15/2023 wnl at 1.34.  Thyroid US was done at [facility] on 11/2023, no images to review. Per their report:    RIGHT lobe: 4.6 x 1.8 x 1.3 cm. Homogeneous echotexture.   Isthmus: 2 mm.   LEFT lobe: 5 x 1.8 x 2.0 cm. Homogeneous echotexture.     NECK: No cervical lymphadenopathy.     NODULES:     Nodule 1: 0.4 x 0.3 x 0.4 cm nodule in the right mid gland.   Composition: Mixed cystic and solid, 1 point   Echogenicity: Hyperechoic or isoechoic, 1 point   Shape: Wider-than-tall, 0 points   Margin: Smooth, 0 points   Echogenic Foci: None, or large comet-tail artifacts, 0 points   Point Total: 1-2 points. TI-RADS 2. No FNA.       Nodule 2: Left upper pole nodule measures 1.7 x 1.2 x 1.6 cm.   Composition: Solid or almost completely solid, 2 points "   Echogenicity: Hyperechoic or isoechoic, 1 point   Shape: Wider-than-tall, 0 points   Margin: Smooth, 0 points   Echogenic Foci: None, or large comet-tail artifacts, 0 points   Point Total: 3 points. TI-RADS 3. If 2.5 cm or larger, recommend FNA; if 1.5 cm or larger, recommend follow up US at 1, 3, and 5 years.     Nodule 3: There are 2 spongiform nodules in the left upper and mid gland measuring up to 0.5 cm.   Composition: Spongiform, 0 points   Point Total: 0 points. TI-RADS 1. No FNA.     Family hx: grandma had hashimotos, mother graves, daughter who is 16 was just diagnosed with graves disease. Multiple family members has goiter.    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      enoxaparin ANTICOAGULANT (LOVENOX) 30 MG/0.3ML syringe Inject 0.3 mLs (30 mg) subcutaneously every 24 hours for 30 doses. 9 mL 0    rizatriptan (MAXALT) 10 MG tablet Take 5 mg by mouth at onset of headache.      traZODone (DESYREL) 50 MG tablet Take 1 tablet (50 mg) by mouth nightly as needed for sleep. 60 tablet 1    valproic acid (DEPAKENE) 250 MG capsule Take 250 mg by mouth daily       No current facility-administered medications for this visit.        Allergies     Allergies   Allergen Reactions    Coumadin [Warfarin And Related]      At high doses had hair loss    Duloxetine Hcl Fatigue       Medical / Surgical History     Past Medical History:   Diagnosis Date    Chronic tension headache     Depressive  disorder, not elsewhere classified 12/05-8/06    started on Zoloft/marques alexandredall on hennepin    Embolism and thrombosis of unspecified site     postop from knee surgery, neg clotting workup X2    Insomnia     Irritable bowel syndrome with both constipation and diarrhea     Other blood disease     Protein S deficiency    Pregnancy with history of infertility     spontaneous conception    Protein S deficiency     Thyroid nodule      Past Surgical History:   Procedure Laterality Date    COLONOSCOPY      HC HYSTEROSALPINGOGRAM  01/19/2006    tubes open    HC KNEE SCOPE, DIAGNOSTIC  14 y/o    Arthroscopy, Knee    HYSTERECTOMY, TOTAL, ROBOT-ASSISTED, USING DA CANDICE XI, WITH SALPINGECTOMY Bilateral 3/26/2025    Procedure: ROBOTIC TOTAL LAPAROSCOPIC HYSTERECTOMY BILATERAL SALPINGECTOMY;  Surgeon: Trey Pederson MD;  Location: [facility] OR    [facility] NONSPECIFIC PROCEDURE  [facility]01/1986    [facility]      k[facility] X 2    [facility] NONSPECIFIC PROCEDURE  01/01/1981    right hernia repair       Social History     Social History     Socioeconomic History    Marital status:      Spouse name:     Number of children: 1    Years of education: 16    Highest education level: Not on file   Occupational History    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     Employer: NONE    Tobacco Use    Smoking status: Never     Passive exposure: Never    Smokeless tobacco: Never   Vaping Use    Vaping status: Never Used   Substance and Sexual Activity    Alcohol use: Yes     Comment: 0-2 a week    Drug use: No    Sexual activity: Yes     Partners: Male   Other Topics Concern    Not on file   Social History Narrative    Balanced Diet - Yes    Osteoporosis Prevention Measures - Dairy servings per day: 1 and Weight bearing exercise    Regular Exercise -  Yes Describe Exercises 3 days per week    Dental Exam - YES - Date: 6 months ago    Eye Exam - about 2 years ago    Self Breast Exam - yes    Abuse: Current or Past  (Physical, Sexual or Emotional)- No    Do you feel safe in your environment - Yes    Guns stored in the home - No    Sunscreen used - yes 40 spf daily    Seatbelts used - Yes    Lipids -  NO    Glucose -  Yes, when she was pregnant 01/30/07    Hemoccults - NA    Pap Test -  Yes- 09/15/06    Do you have any concerns about STD's -  No    Mammography - NA    DEXA - NA    Immunizations reviewed and up to date - last td was possibly given in 2004, would have to check at home    Savannah Johnson ma  2007             Social Drivers of Health     Financial Resource Strain: Low Risk  (2/26/2025)    Financial Resource Strain     Within the past 12 months, have you or your family members you live with been unable to get utilities (heat, electricity) when it was really needed?: No   Food Insecurity: Low Risk  (2/26/2025)    Food Insecurity     Within the past 12 months, did you worry that your food would run out before you got money to buy more?: No     Within the past 12 months, did the food you bought just not last and you didn t have money to get more?: No   Transportation Needs: Low Risk  (2/26/2025)    Transportation Needs     Within the past 12 months, has lack of transportation kept you from medical appointments, getting your medicines, non-medical meetings or appointments, work, or from getting things that you need?: No   Physical Activity: Unknown (2/26/2025)    Exercise Vital Sign     Days of Exercise per Week: 5 days     Minutes of Exercise per Session: Not on file   Stress: Stress Concern Present (2/26/2025)    [place] [employer] - Occupational Stress Questionnaire     Feeling of Stress : To some extent   Social Connections: Unknown (2/26/2025)    Social Connection and Isolation Panel [NHANES]     Frequency of Communication with Friends and Family: Not on file     Frequency of Social Gatherings with Friends and Family: Once a week     Attends Yarsanism Services: Not on file     Active Member of  "Clubs or Organizations: Not on file     Attends Club or Organization Meetings: Not on file     Marital Status: Not on file   Interpersonal Safety: Low Risk  (3/26/2025)    Interpersonal Safety     Do you feel physically and emotionally safe where you currently live?: Yes     Within the past 12 months, have you been hit, slapped, kicked or otherwise physically hurt by someone?: No     Within the past 12 months, have you been humiliated or emotionally abused in other ways by your partner or ex-partner?: No   Housing Stability: Low Risk  (2/26/2025)    Housing Stability     Do you have housing? : Yes     Are you worried about losing your housing?: No       Family History     Family History   Problem Relation Age of Onset    C.A.D. Father         angina- 50    Diabetes Mother     Hypertension No family hx of     Cerebrovascular Disease Maternal Grandfather     Cerebrovascular Disease Paternal Grandfather     Breast Cancer No family hx of     Cancer - colorectal No family hx of     Thyroid Disease Mother     Blood Disease Other         self, protein s deficiency       ROS     12 ROS completed, pertinent positive and negative in HPI    Physical Exam   /72   Pulse 80   Ht 1.6 m (5' 3\")   Wt 58.5 kg (129 lb)   LMP  (LMP Unknown)   SpO2 99%   BMI 22.85 kg/m     GENERAL: alert and no distress  EYES: Eyes grossly normal to inspection.  No discharge or erythema, or obvious scleral/conjunctival abnormalities.  Neck: mild thyroid enlargement, 2 cm left thyroid nodule palpable, no palpable LN  RESP: No audible wheeze, cough, or visible cyanosis.    SKIN: Visible skin clear. No significant rash, abnormal pigmentation or lesions.  NEURO: Cranial nerves grossly intact.  Mentation and speech appropriate for age.  PSYCH: Appropriate affect, tone, and pace of words     Labs/Imaging   I personally reviewed image.  EXAM: US THYROID  LOCATION: [facility]  DATE: 4/7/2025     INDICATION:  " Goiter  COMPARISON: 11/29/2023  TECHNIQUE: Thyroid ultrasound.      FINDINGS:  RIGHT lobe: 5.6 x 1.7 x 1.5 cm. Heterogenous echotexture.  Isthmus: 2 mm.  LEFT lobe: 5.5 x 1.9 x 2.0 cm. Heterogenous echotexture.     NECK: No cervical lymphadenopathy.     NODULES:     Nodule 1: Left mid Thyroid nodule measures 1.6 x 1.4 x 0.9 cm compared to 1.7 x 1.6 x 1.2 cm previously. This component is predominantly spongiform. However, there is a larger more solid component peripherally with the total size measuring 2.5 x 1.9 x   1.2 cm.  Composition: Unable to determine, 2 points   Echogenicity: Hyperechoic or isoechoic, 1 point   Shape: Wider-than-tall, 0 points   Margin: Smooth, 0 points   Echogenic Foci: None, or large comet-tail artifacts, 0 points   Point Total: 3 points. TI-RADS 3. If 2.5 cm or larger, recommend FNA; if 1.5 cm or larger, recommend follow up US at 1, 3, and 5 years.      There are scattered benign spongiform TI RADS 1 and mixed cystic/solid TI RADS 2 2 subcentimeter nodules which are unchanged. These do not appear criteria for FNA or follow-up.                                                                      IMPRESSION:  1.  Again seen is the left mid thyroid nodule. There is a component which is spongiform within this nodule. However, there is a larger component surrounding this nodule which is more solid. This measures 2.5 x 1.9 x 1.2 cm. While this is equivocal and   the characteristics this remains favored to be a benign spongiform nodule. However, the larger more solid component makes this a TI RADS 3 nodule and meets criteria for FNA.    TSH   Date Value Ref Range Status   12/08/2024 1.06 0.30 - 4.20 uIU/mL Final   11/15/2023 1.34 0.30 - 4.20 uIU/mL Final   07/21/2021 1.94 mIU/L Final     Comment:               Reference Range                         > or = 20 Years  0.40-4.50                              Pregnancy Ranges            First trimester    0.26-2.66            Second trimester    0.55-2.73            Third trimester    0.43-2.91  Lab test performed by:  Lab Mnemonic:CB  [facility]  [address] [place] [address]  Van Mendes M.D.  [facility] Specimen received date and time: 22-JUL-2021 02:47:00.00     02/12/2009 0.58 0.4 - 5.0 mU/L Final   03/18/2008 1.10 0.4 - 5.0 mU/L Final   02/27/2008 1.04 0.4 - 5.0 mU/L Final   09/13/2007 0.74 0.4 - 5.0 mU/L Final   07/25/2005 1.00 0.4 - 5.0 mU/L Final     T4 Free   Date Value Ref Range Status   05/29/2009 1.11 0.70 - 1.85 ng/dL Final       Creatinine   Date Value Ref Range Status   12/08/2024 0.74 0.51 - 0.95 mg/dL Final   02/12/2009 0.70 0.52 - 1.04 mg/dL Final     Comment:     New IDMS-traceable calibration  beginning 5/1/08     I personally reviewed the patient's outside records from [facility] EMR and Care Everywhere. Summary of pertinent findings in HPI.    Impression / Plan     ## 2.5 cm left thyroid nodule with obstructive symptoms  ## FH of goiter, Graves and Hashimoto  Thyroid function test normal 12/2024  US shows 2.5 cm TR 3 nodule  We discussed about thyroid nodule, indication for biopsy and treatment  -- FNA 2.5 cm left thyroid nodule    [email or web address]    Follow-up 1 year      INFORMATION FOR PATIENTS  THYROID NODULES AND   FINE NEEDLE ASPIRATION BIOPSY OF THE THYROID     The finding of a thyroid nodule is almost never an emergency.  Thyroid nodules are common, occurring in up to 50% of patients over the age of 50 years.       Innocent (not important enough to have been detected during life) thyroid cancer may be found in around 5% of people.   Likewise, about 5-15% of patients with demonstrated thyroid nodules will prove to have thyroid cancer if subjected to aggressive diagnostic measures.  As a rule, thyroid cancer has an excellent prognosis.  Therefore, in each patient with thyroid nodules, the decision has to be made about how important it is to identify and treat a cancer, if present.        When we find nodules on the thyroid we use ultrasound and either palpation or ultrasound guided biopsy to help determine which patients, from the large number with nodules on the thyroid, are in the group containing an important thyroid cancer.       Ultrasound Guided Fine Needle Aspiration Biopsy of the Thyroid uses the ultrasound eye to see the nodule and the needle during the biopsy procedure.  This procedure is performed in the radiology department.  The radiologist uses a small needle to remove cells from the specific area of the thyroid. The cells are then analyzed under the microscope to determine whether or not they might be cancer.       The results of thyroid biopsy come in 4 categories     Benign or negative for cancer.  This is most common result, found in approximately 60-70% of biopsy specimens.  There remains a < 6 % chance that this result is wrong.     Positive for cancer.  This is found about 5 % of the time. There remains a  < 1% chance that cancer is not present when we make this diagnosis by biopsy. This result leads to a recommendation for surgery to make the final diagnosis of cancer.      Indeterminate, or the grey zone.  This is found in approximately 20-30% of patients.  This diagnosis identifies a higher risk group, often resulting in diagnostic surgery to establish the final diagnosis.  If all patients in this group go to surgery, only 10-30%, on average, prove to have cancer.  This group is subdivided into 3 subcategories: atypia of undetermined significance, follicular neoplasm and suspicious, with increasing risk.       Insufficient for diagnosis. This is found in 5-10% of biopsies. When this occurs we need to repeat the biopsy.       The greatest risk of thyroid biopsy is that the result is not clear (that it is in the indeterminate grey zone group), resulting in future thyroid surgery for what is likely to be benign thyroid disease.  There is a small risk of bleeding or  infection.       If your doctor has recommended you have an ultrasound guided fine needle aspiration biopsy of the thyroid, your appointment may be scheduled by calling [phone].  Be sure to specify that the procedure is to be ultrasound-guided and that it is to be scheduled in radiology      Test and/or medications prescribed today:  Orders Placed This Encounter   Procedures    US Biopsy Thyroid Fine Needle Aspiration       Nigel Jensen MD

## 2025-04-10 ENCOUNTER — OFFICE VISIT (OUTPATIENT)
Dept: ENDOCRINOLOGY | Facility: CLINIC | Age: 45
End: 2025-04-10
Payer: COMMERCIAL

## 2025-04-10 VITALS
HEIGHT: 63 IN | WEIGHT: 129 LBS | SYSTOLIC BLOOD PRESSURE: 113 MMHG | OXYGEN SATURATION: 99 % | BODY MASS INDEX: 22.86 KG/M2 | HEART RATE: 80 BPM | DIASTOLIC BLOOD PRESSURE: 72 MMHG

## 2025-04-10 DIAGNOSIS — E04.9 GOITER: Primary | ICD-10-CM

## 2025-04-10 ASSESSMENT — PAIN SCALES - GENERAL: PAINLEVEL_OUTOF10: MODERATE PAIN (4)

## 2025-04-10 NOTE — PATIENT INSTRUCTIONS
## 2.5 cm left thyroid nodule with obstructive symptoms  ## FH of goiter, Graves and Hashimoto  Thyroid function test normal 12/2024  -- FNA 2.5 cm left thyroid nodule    https://www.thyroid.org/thyroid-nodules/    Follow-up 1 year

## 2025-04-10 NOTE — LETTER
"4/10/2025       RE: Lazara Bahena  [address]  [facility] [id number]     Dear Colleague,    Thank you for referring your patient, Lazara Bahena, to the [facility] [place] at [facility]. Please see a copy of my visit note below.    Endocrinology Clinic Visit     Chief Complaint     Chief Complaint   Patient presents with     Thyroid Problem       History of Present Illness     Lazara Bahena is a 44 year old female who is seen for follow up thyroid goiter. Previously saw Dr. Padilla. Pt is here with     Interval History  Worsening obstructive symptoms started about 5 mo ago, problem with swallowing (feeling like something is at the neck area), sensation when laying down, some hoarse voice  Daughter with Grave's and goiter  Mom with Hashimoto's and thyroid nodule s/p surgery    Tired after hysterectomy 3/2025, no ovaries removal  Weight stable  Work out daily    No eye symptoms except for getting older, no eye pain/ double vision    Protein S deficiency, diagnosed age 15 after developing DVT post ACL surgery    Initial visit  She reported fatigue and hair loss since last October 2013.  She felt a lump in her throat when swallowing, no pain or discomfort, people commented that her neck is big. It feels like there is always something in her throat, it doesn't feel smooth. No difficulty swallowing but \"feels more aware of swallowing\". No SOB when lying down.    She was seen by her PCP, tsh ON 11/15/2023 wnl at 1.34.  Thyroid US was done at [facility] on 11/2023, no images to review. Per their report:    RIGHT lobe: 4.6 x 1.8 x 1.3 cm. Homogeneous echotexture.   Isthmus: 2 mm.   LEFT lobe: 5 x 1.8 x 2.0 cm. Homogeneous echotexture.     NECK: No cervical lymphadenopathy.     NODULES:     Nodule 1: 0.4 x 0.3 x 0.4 cm nodule in the right mid gland.   Composition: Mixed cystic and solid, 1 point   Echogenicity: Hyperechoic or " isoechoic, 1 point   Shape: Wider-than-tall, 0 points   Margin: Smooth, 0 points   Echogenic Foci: None, or large comet-tail artifacts, 0 points   Point Total: 1-2 points. TI-RADS 2. No FNA.       Nodule 2: Left upper pole nodule measures 1.7 x 1.2 x 1.6 cm.   Composition: Solid or almost completely solid, 2 points   Echogenicity: Hyperechoic or isoechoic, 1 point   Shape: Wider-than-tall, 0 points   Margin: Smooth, 0 points   Echogenic Foci: None, or large comet-tail artifacts, 0 points   Point Total: 3 points. TI-RADS 3. If 2.5 cm or larger, recommend FNA; if 1.5 cm or larger, recommend follow up US at 1, 3, and 5 years.     Nodule 3: There are 2 spongiform nodules in the left upper and mid gland measuring up to 0.5 cm.   Composition: Spongiform, 0 points   Point Total: 0 points. TI-RADS 1. No FNA.     Family hx: grandma had hashimotos, mother graves, daughter who is 16 was just diagnosed with graves disease. Multiple family members has goiter.    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       enoxaparin ANTICOAGULANT (LOVENOX) 30 MG/0.3ML syringe Inject 0.3 mLs (30 mg) subcutaneously every 24 hours for 30 doses. 9 mL 0     rizatriptan (MAXALT) 10 MG tablet Take 5 mg by mouth at onset of headache.       traZODone (DESYREL) 50 MG tablet Take 1 tablet (50 mg) by mouth nightly as needed for sleep. 60 tablet 1     valproic acid (DEPAKENE) 250 MG capsule Take 250 mg by mouth daily        No current facility-administered medications for this visit.        Allergies     Allergies   Allergen Reactions     Coumadin [Warfarin And Related]      At high doses had hair loss     Duloxetine Hcl Fatigue       Medical / Surgical History     Past Medical History:   Diagnosis Date     Chronic tension headache      Depressive disorder, not elsewhere classified 12/05-8/06    started on Zoloft/marques kenia on hennepin     Embolism and thrombosis of unspecified site     postop from knee surgery, neg clotting workup X2     Insomnia      Irritable bowel syndrome with both constipation and diarrhea      Other blood disease     Protein S deficiency     Pregnancy with history of infertility     spontaneous conception     Protein S deficiency      Thyroid nodule      Past Surgical History:   Procedure Laterality Date     COLONOSCOPY       HC HYSTEROSALPINGOGRAM  01/19/2006    tubes open     HC KNEE SCOPE, DIAGNOSTIC  14 y/o    Arthroscopy, Knee     HYSTERECTOMY, TOTAL, ROBOT-ASSISTED, USING DA CANDICE XI, WITH SALPINGECTOMY Bilateral 3/26/2025    Procedure: ROBOTIC TOTAL LAPAROSCOPIC HYSTERECTOMY BILATERAL SALPINGECTOMY;  Surgeon: Trey Pederson MD;  Location: [facility] OR     [facility] NONSPECIFIC PROCEDURE  01/01/1986    appy     [facility] NONSPECIFIC PROCEDURE      knee surgery X 2     [facility] NONSPECIFIC PROCEDURE  01/01/1981    right hernia repair       Social History     Social History     Socioeconomic History     Marital status:      Spouse name:      Number of children: 1     Years of education: 16     Highest education level: Not on file   Occupational History     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     Employer: NONE    Tobacco Use     Smoking status: Never     Passive exposure: Never     Smokeless tobacco: Never   Vaping Use     Vaping status: Never Used   Substance and Sexual Activity     Alcohol use: Yes     Comment: 0-2 a week     Drug use: No     Sexual activity: Yes     Partners: Male   Other Topics  Concern     Not on file   Social History Narrative    Balanced Diet - Yes    Osteoporosis Prevention Measures - Dairy servings per day: 1 and Weight bearing exercise    Regular Exercise -  Yes Describe Exercises 3 days per week    Dental Exam - YES - Date: 6 months ago    Eye Exam - about 2 years ago    Self Breast Exam - yes    Abuse: Current or Past (Physical, Sexual or Emotional)- No    Do you feel safe in your environment - Yes    Guns stored in the home - No    Sunscreen used - yes 40 spf daily    Seatbelts used - Yes    Lipids -  NO    Glucose -  Yes, when she was pregnant 01/30/07    Hemoccults - NA    Pap Test -  Yes- 09/15/06    Do you have any concerns about STD's -  No    Mammography - NA    DEXA - NA    Immunizations reviewed and up to date - last td was possibly given in 2004, would have to check at home    Savannah Johnson ma  2007             Social Drivers of Health     Financial Resource Strain: Low Risk  (2/26/2025)    Financial Resource Strain      Within the past 12 months, have you or your family members you live with been unable to get utilities (heat, electricity) when it was really needed?: No   Food Insecurity: Low Risk  (2/26/2025)    Food Insecurity      Within the past 12 months, did you worry that your food would run out before you got money to buy more?: No      Within the past 12 months, did the food you bought just not last and you didn t have money to get more?: No   Transportation Needs: Low Risk  (2/26/2025)    Transportation Needs      Within the past 12 months, has lack of transportation kept you from medical appointments, getting your medicines, non-medical meetings or appointments, work, or from getting things that you need?: No   Physical Activity: Unknown (2/26/2025)    Exercise Vital Sign      Days of Exercise per Week: 5 days      Minutes of Exercise per Session: Not on file   Stress: Stress Concern Present (2/26/2025)    [place] [employer] - Occupational  "Stress Questionnaire      Feeling of Stress : To some extent   Social Connections: Unknown (2/26/2025)    Social Connection and Isolation Panel [NHANES]      Frequency of Communication with Friends and Family: Not on file      Frequency of Social Gatherings with Friends and Family: Once a week      Attends Rastafarian Services: Not on file      Active Member of Clubs or Organizations: Not on file      Attends Club or Organization Meetings: Not on file      Marital Status: Not on file   Interpersonal Safety: Low Risk  (3/26/2025)    Interpersonal Safety      Do you feel physically and emotionally safe where you currently live?: Yes      Within the past 12 months, have you been hit, slapped, kicked or otherwise physically hurt by someone?: No      Within the past 12 months, have you been humiliated or emotionally abused in other ways by your partner or ex-partner?: No   Housing Stability: Low Risk  (2/26/2025)    Housing Stability      Do you have housing? : Yes      Are you worried about losing your housing?: No       Family History     Family History   Problem Relation Age of Onset     C.A.D. Father         angina- 50     Diabetes Mother      Hypertension No family hx of      Cerebrovascular Disease Maternal Grandfather      Cerebrovascular Disease Paternal Grandfather      Breast Cancer No family hx of      Cancer - colorectal No family hx of      Thyroid Disease Mother      Blood Disease Other         self, protein s deficiency       ROS     12 ROS completed, pertinent positive and negative in HPI    Physical Exam   /72   Pulse 80   Ht 1.6 m (5' 3\")   Wt 58.5 kg (129 lb)   LMP  (LMP Unknown)   SpO2 99%   BMI 22.85 kg/m     GENERAL: alert and no distress  EYES: Eyes grossly normal to inspection.  No discharge or erythema, or obvious scleral/conjunctival abnormalities.  Neck: mild thyroid enlargement, 2 cm left thyroid nodule palpable, no palpable LN  RESP: No audible wheeze, cough, or visible cyanosis.  "   SKIN: Visible skin clear. No significant rash, abnormal pigmentation or lesions.  NEURO: Cranial nerves grossly intact.  Mentation and speech appropriate for age.  PSYCH: Appropriate affect, tone, and pace of words     Labs/Imaging   I personally reviewed image.  EXAM: US THYROID  LOCATION: [facility]  DATE: 4/7/2025     INDICATION:  Goiter  COMPARISON: 11/29/2023  TECHNIQUE: Thyroid ultrasound.      FINDINGS:  RIGHT lobe: 5.6 x 1.7 x 1.5 cm. Heterogenous echotexture.  Isthmus: 2 mm.  LEFT lobe: 5.5 x 1.9 x 2.0 cm. Heterogenous echotexture.     NECK: No cervical lymphadenopathy.     NODULES:     Nodule 1: Left mid Thyroid nodule measures 1.6 x 1.4 x 0.9 cm compared to 1.7 x 1.6 x 1.2 cm previously. This component is predominantly spongiform. However, there is a larger more solid component peripherally with the total size measuring 2.5 x 1.9 x   1.2 cm.  Composition: Unable to determine, 2 points   Echogenicity: Hyperechoic or isoechoic, 1 point   Shape: Wider-than-tall, 0 points   Margin: Smooth, 0 points   Echogenic Foci: None, or large comet-tail artifacts, 0 points   Point Total: 3 points. TI-RADS 3. If 2.5 cm or larger, recommend FNA; if 1.5 cm or larger, recommend follow up US at 1, 3, and 5 years.      There are scattered benign spongiform TI RADS 1 and mixed cystic/solid TI RADS 2 2 subcentimeter nodules which are unchanged. These do not appear criteria for FNA or follow-up.                                                                      IMPRESSION:  1.  Again seen is the left mid thyroid nodule. There is a component which is spongiform within this nodule. However, there is a larger component surrounding this nodule which is more solid. This measures 2.5 x 1.9 x 1.2 cm. While this is equivocal and   the characteristics this remains favored to be a benign spongiform nodule. However, the larger more solid component makes this a TI RADS 3 nodule and meets criteria for FNA.    TSH    Date Value Ref Range Status   12/08/2024 1.06 0.30 - 4.20 uIU/mL Final   11/15/2023 1.34 0.30 - 4.20 uIU/mL Final   07/21/2021 1.94 mIU/L Final     Comment:               Reference Range                         > or = 20 Years  0.40-4.50                              Pregnancy Ranges            First trimester    0.26-2.66            Second trimester   0.55-2.73            Third trimester    0.43-2.91  Lab test performed by:  Lab Mnemonic:[facility]  [facility]  [address] [place] [address]  Van Mendes M.D.  [facility] Specimen received date and time: 22-JUL-2021 02:47:00.00     02/12/2009 0.58 0.4 - 5.0 mU/L Final   03/18/2008 1.10 0.4 - 5.0 mU/L Final   02/27/2008 1.04 0.4 - 5.0 mU/L Final   09/13/2007 0.74 0.4 - 5.0 mU/L Final   07/25/2005 1.00 0.4 - 5.0 mU/L Final     T4 Free   Date Value Ref Range Status   05/29/2009 1.11 0.70 - 1.85 ng/dL Final       Creatinine   Date Value Ref Range Status   12/08/2024 0.74 0.51 - 0.95 mg/dL Final   02/12/2009 0.70 0.52 - 1.04 mg/dL Final     Comment:     New IDMS-traceable calibration  beginning 5/1/08     I personally reviewed the patient's outside records from [facility] Everywhere. Summary of pertinent findings in HPI.    Impression / Plan     ## 2.5 cm left thyroid nodule with obstructive symptoms  ## FH of goiter, Graves and Hashimoto  Thyroid function test normal 12/2024  US shows 2.5 cm TR 3 nodule  We discussed about thyroid nodule, indication for biopsy and treatment  -- FNA 2.5 cm left thyroid nodule    [email or web address]    Follow-up 1 year      INFORMATION FOR PATIENTS  THYROID NODULES AND   FINE NEEDLE ASPIRATION BIOPSY OF THE THYROID     The finding of a thyroid nodule is almost never an emergency.  Thyroid nodules are common, occurring in up to 50% of patients over the age of 50 years.       Innocent (not important enough to have been detected during life) thyroid cancer may be found in around 5% of  people.   Likewise, about 5-15% of patients with demonstrated thyroid nodules will prove to have thyroid cancer if subjected to aggressive diagnostic measures.  As a rule, thyroid cancer has an excellent prognosis.  Therefore, in each patient with thyroid nodules, the decision has to be made about how important it is to identify and treat a cancer, if present.       When we find nodules on the thyroid we use ultrasound and either palpation or ultrasound guided biopsy to help determine which patients, from the large number with nodules on the thyroid, are in the group containing an important thyroid cancer.       Ultrasound Guided Fine Needle Aspiration Biopsy of the Thyroid uses the ultrasound eye to see the nodule and the needle during the biopsy procedure.  This procedure is performed in the radiology department.  The radiologist uses a small needle to remove cells from the specific area of the thyroid. The cells are then analyzed under the microscope to determine whether or not they might be cancer.       The results of thyroid biopsy come in 4 categories     Benign or negative for cancer.  This is most common result, found in approximately 60-70% of biopsy specimens.  There remains a < 6 % chance that this result is wrong.     Positive for cancer.  This is found about 5 % of the time. There remains a  < 1% chance that cancer is not present when we make this diagnosis by biopsy. This result leads to a recommendation for surgery to make the final diagnosis of cancer.      Indeterminate, or the grey zone.  This is found in approximately 20-30% of patients.  This diagnosis identifies a higher risk group, often resulting in diagnostic surgery to establish the final diagnosis.  If all patients in this group go to surgery, only 10-30%, on average, prove to have cancer.  This group is subdivided into 3 subcategories: atypia of undetermined significance, follicular neoplasm and suspicious, with increasing risk.        Insufficient for diagnosis. This is found in 5-10% of biopsies. When this occurs we need to repeat the biopsy.       The greatest risk of thyroid biopsy is that the result is not clear (that it is in the indeterminate grey zone group), resulting in future thyroid surgery for what is likely to be benign thyroid disease.  There is a small risk of bleeding or infection.       If your doctor has recommended you have an ultrasound guided fine needle aspiration biopsy of the thyroid, your appointment may be scheduled by calling [phone].  Be sure to specify that the procedure is to be ultrasound-guided and that it is to be scheduled in radiology      Test and/or medications prescribed today:  Orders Placed This Encounter   Procedures     US Biopsy Thyroid Fine Needle Aspiration       Nigel Jensen MD        Again, thank you for allowing me to participate in the care of your patient.      Sincerely,    Nigel Jensen MD

## 2025-04-10 NOTE — NURSING NOTE
"Chief Complaint   Patient presents with    Thyroid Problem     Vital signs:      BP: 113/72 Pulse: 80     SpO2: 99 %     Height: 160 cm (5' 3\") Weight: 58.5 kg (129 lb)  Estimated body mass index is 22.85 kg/m  as calculated from the following:    Height as of this encounter: 1.6 m (5' 3\").    Weight as of this encounter: 58.5 kg (129 lb).        "

## 2025-04-23 ENCOUNTER — ANCILLARY PROCEDURE (OUTPATIENT)
Dept: ULTRASOUND IMAGING | Facility: CLINIC | Age: 45
End: 2025-04-23
Attending: INTERNAL MEDICINE
Payer: COMMERCIAL

## 2025-04-23 DIAGNOSIS — E04.9 GOITER: ICD-10-CM

## 2025-04-23 PROCEDURE — 10005 FNA BX W/US GDN 1ST LES: CPT

## 2025-04-23 PROCEDURE — 99207 FINE NEEDLE ASPIRATE: CPT | Performed by: PATHOLOGY

## 2025-04-24 LAB
PATH REPORT.COMMENTS IMP SPEC: NORMAL
PATH REPORT.FINAL DX SPEC: NORMAL
PATH REPORT.GROSS SPEC: NORMAL
PATH REPORT.MICROSCOPIC SPEC OTHER STN: NORMAL
PATH REPORT.RELEVANT HX SPEC: NORMAL

## 2025-04-30 ENCOUNTER — TELEPHONE (OUTPATIENT)
Dept: ENDOCRINOLOGY | Facility: CLINIC | Age: 45
End: 2025-04-30

## 2025-04-30 ENCOUNTER — TELEPHONE (OUTPATIENT)
Dept: FAMILY MEDICINE | Facility: CLINIC | Age: 45
End: 2025-04-30

## 2025-04-30 ENCOUNTER — OFFICE VISIT (OUTPATIENT)
Dept: FAMILY MEDICINE | Facility: CLINIC | Age: 45
End: 2025-04-30
Payer: COMMERCIAL

## 2025-04-30 VITALS
DIASTOLIC BLOOD PRESSURE: 78 MMHG | OXYGEN SATURATION: 99 % | WEIGHT: 129 LBS | TEMPERATURE: 97.7 F | BODY MASS INDEX: 22.86 KG/M2 | SYSTOLIC BLOOD PRESSURE: 116 MMHG | RESPIRATION RATE: 16 BRPM | HEART RATE: 81 BPM | HEIGHT: 63 IN

## 2025-04-30 DIAGNOSIS — E04.1 THYROID NODULE: Primary | ICD-10-CM

## 2025-04-30 DIAGNOSIS — Z90.710 S/P HYSTERECTOMY: ICD-10-CM

## 2025-04-30 PROCEDURE — 3078F DIAST BP <80 MM HG: CPT

## 2025-04-30 PROCEDURE — 99213 OFFICE O/P EST LOW 20 MIN: CPT

## 2025-04-30 PROCEDURE — 3074F SYST BP LT 130 MM HG: CPT

## 2025-04-30 RX ORDER — MEDROXYPROGESTERONE ACETATE 10 MG
1 TABLET ORAL
COMMUNITY
Start: 2025-02-25 | End: 2025-04-30

## 2025-04-30 RX ORDER — ELAGOLIX 200 MG/1
1 TABLET, FILM COATED ORAL DAILY
COMMUNITY
Start: 2025-03-10 | End: 2025-04-30

## 2025-04-30 NOTE — TELEPHONE ENCOUNTER
Patient Quality Outreach    Patient is due for the following:   Colon Cancer Screening  Breast Cancer Screening - Mammogram  Physical Preventive Adult Physical      Topic Date Due    Hepatitis B Vaccine (1 of 3 - 19+ 3-dose series) Never done    Diptheria Tetanus Pertussis (DTAP/TDAP/TD) Vaccine (3 - Td or Tdap) 07/09/2019    Flu Vaccine (1) 09/01/2024    COVID-19 Vaccine (3 - 2024-25 season) 09/01/2024       Action(s) Taken:   Patient was scheduled for office visit today with MENDEZ Worrell. Patient declined a physical today, but agreed to schedule a physical next week. Assisted in scheduling.     Type of outreach:    Face to face    Questions for provider review:    None         Ashleigh Phillips MA  Chart routed to None.

## 2025-04-30 NOTE — TELEPHONE ENCOUNTER
My chart sent with reason why biopsy is not done within 3 months of the last one .Bianca Cortez RN on 4/30/2025 at 10:17 AM

## 2025-04-30 NOTE — PROGRESS NOTES
"  Assessment & Plan     Thyroid nodule  Had FNA of Thyroid that came back undetermined with endocrinology. Will be repeated in 3 months or can have surgery for removal.  Patient interested in referral for surgery, referral to ENT placed.  Patient is also interested in speaking with another endocrinologist, referral placed for [facility] end[facility].  - Adult ENT  Referral; Future  - Adult Endocrinology  Referral; Future    S/P hysterectomy  Recent hysterectomy with cervix removal per patient.  Patient has been experiencing fatigue and continues to follow with gynecologist.                Sherrill Haile is a 44 year old, presenting for the following health issues:  Follow Up (Thyroid nodule follow up. Trouble swallowing the last 6 months. Wanting a second opinion. )      4/30/2025     9:55 AM   Additional Questions   Roomed by brandon blum   Accompanied by self     History of Present Illness       Reason for visit:  Thyroid    She eats 2-3 servings of fruits and vegetables daily.She consumes 1 sweetened beverage(s) daily.She exercises with enough effort to increase her heart rate 30 to 60 minutes per day.  She exercises with enough effort to increase her heart rate 5 days per week.   She is taking medications regularly.                      Objective    /78 (BP Location: Right arm, Patient Position: Sitting)   Pulse 81   Temp 97.7  F (36.5  C) (Tympanic)   Resp 16   Ht 1.6 m (5' 3\")   Wt 58.5 kg (129 lb)   LMP  (LMP Unknown)   SpO2 99%   BMI 22.85 kg/m    Body mass index is 22.85 kg/m .  Physical Exam     Physical Exam  Constitutional:       Appearance: Normal appearance.   HENT:      Head: Normocephalic.      Mouth/Throat:      Mouth: Mucous membranes are moist.   Eyes:      Extraocular Movements: Extraocular movements intact.      Conjunctiva/sclera: Conjunctivae normal.   Cardiovascular:      Rate and Rhythm: Normal rate.   Pulmonary:      Effort: Pulmonary effort is " normal. No respiratory distress.   Skin:     General: Skin is warm and dry.      Capillary Refill: Capillary refill takes less than 2 seconds.   Neurological:      Mental Status: She is alert and oriented to person, place, and time.   Psychiatric:         Behavior: Behavior normal.         Thought Content: Thought content normal.               Signed Electronically by: WAYNE Burleson CNP

## 2025-04-30 NOTE — TELEPHONE ENCOUNTER
----- Message from Nigel Jensen sent at 4/29/2025  4:32 PM CDT -----  Regarding: RE: Nodule  Per SAMANTHA recommendation, it has been suggested that repeat FNA should be performed no sooner than 3  months after the initial FNA to prevent false-positive interpretation due to biopsy-induced reactive/reparative change.  ----- Message -----  From: Bianca Cortez RN  Sent: 4/29/2025   4:19 PM CDT  To: Nigel Jensen MD  Subject: Nodule                                           She prefers to know what the  nodule is rather than remove it first.  Does she have to wait 3 months  or can she repeat the biopsy in one month  ?   Why wait 3 months can they attempt it again now ? If it came back Atypia we could do Afirma but  it didn't get anything ..  She  would love to schedule another  biopsy now  . Bianca Cortez RN on 4/29/2025 at 4:19 PM

## 2025-05-01 ENCOUNTER — PATIENT OUTREACH (OUTPATIENT)
Dept: CARE COORDINATION | Facility: CLINIC | Age: 45
End: 2025-05-01
Payer: COMMERCIAL

## 2025-05-01 ENCOUNTER — TELEPHONE (OUTPATIENT)
Dept: ENDOCRINOLOGY | Facility: CLINIC | Age: 45
End: 2025-05-01

## 2025-05-01 NOTE — TELEPHONE ENCOUNTER
Called and discussed with patient.  Lazara prefers to proceed with repeat FNA in 3 month and also would like ENT referral to discuss lobectomy for obstructive symptoms.

## 2025-05-05 ENCOUNTER — PATIENT OUTREACH (OUTPATIENT)
Dept: CARE COORDINATION | Facility: CLINIC | Age: 45
End: 2025-05-05
Payer: COMMERCIAL

## 2025-05-06 ENCOUNTER — ORDERS ONLY (AUTO-RELEASED) (OUTPATIENT)
Dept: FAMILY MEDICINE | Facility: CLINIC | Age: 45
End: 2025-05-06

## 2025-05-06 ENCOUNTER — OFFICE VISIT (OUTPATIENT)
Dept: FAMILY MEDICINE | Facility: CLINIC | Age: 45
End: 2025-05-06
Payer: COMMERCIAL

## 2025-05-06 VITALS
TEMPERATURE: 97.7 F | DIASTOLIC BLOOD PRESSURE: 68 MMHG | RESPIRATION RATE: 16 BRPM | HEART RATE: 73 BPM | WEIGHT: 126.7 LBS | SYSTOLIC BLOOD PRESSURE: 112 MMHG | HEIGHT: 63 IN | BODY MASS INDEX: 22.45 KG/M2 | OXYGEN SATURATION: 99 %

## 2025-05-06 DIAGNOSIS — Z13.6 SCREENING FOR CARDIOVASCULAR CONDITION: ICD-10-CM

## 2025-05-06 DIAGNOSIS — Z12.11 SCREEN FOR COLON CANCER: ICD-10-CM

## 2025-05-06 DIAGNOSIS — E04.1 THYROID NODULE: ICD-10-CM

## 2025-05-06 DIAGNOSIS — Z00.00 ANNUAL PHYSICAL EXAM: Primary | ICD-10-CM

## 2025-05-06 PROBLEM — Z86.69 HISTORY OF MIGRAINE: Status: ACTIVE | Noted: 2025-05-06

## 2025-05-06 PROBLEM — I82.409 DEEP VEIN THROMBOSIS (DVT) (H): Status: ACTIVE | Noted: 2025-05-06

## 2025-05-06 LAB
ANION GAP SERPL CALCULATED.3IONS-SCNC: 11 MMOL/L (ref 7–15)
BUN SERPL-MCNC: 16.5 MG/DL (ref 6–20)
CALCIUM SERPL-MCNC: 8.9 MG/DL (ref 8.8–10.4)
CHLORIDE SERPL-SCNC: 105 MMOL/L (ref 98–107)
CHOLEST SERPL-MCNC: 212 MG/DL
CREAT SERPL-MCNC: 0.79 MG/DL (ref 0.51–0.95)
EGFRCR SERPLBLD CKD-EPI 2021: >90 ML/MIN/1.73M2
FASTING STATUS PATIENT QL REPORTED: ABNORMAL
FASTING STATUS PATIENT QL REPORTED: NORMAL
GLUCOSE SERPL-MCNC: 85 MG/DL (ref 70–99)
HCO3 SERPL-SCNC: 24 MMOL/L (ref 22–29)
HDLC SERPL-MCNC: 70 MG/DL
LDLC SERPL CALC-MCNC: 125 MG/DL
NONHDLC SERPL-MCNC: 142 MG/DL
POTASSIUM SERPL-SCNC: 4 MMOL/L (ref 3.4–5.3)
SODIUM SERPL-SCNC: 140 MMOL/L (ref 135–145)
T3FREE SERPL-MCNC: 3 PG/ML (ref 2–4.4)
T4 FREE SERPL-MCNC: 1.2 NG/DL (ref 0.9–1.7)
TRIGL SERPL-MCNC: 87 MG/DL
TSH SERPL DL<=0.005 MIU/L-ACNC: 0.76 UIU/ML (ref 0.3–4.2)

## 2025-05-06 PROCEDURE — 84481 FREE ASSAY (FT-3): CPT

## 2025-05-06 PROCEDURE — 90715 TDAP VACCINE 7 YRS/> IM: CPT

## 2025-05-06 PROCEDURE — 99396 PREV VISIT EST AGE 40-64: CPT | Mod: 25

## 2025-05-06 PROCEDURE — 84443 ASSAY THYROID STIM HORMONE: CPT

## 2025-05-06 PROCEDURE — 3074F SYST BP LT 130 MM HG: CPT

## 2025-05-06 PROCEDURE — 80048 BASIC METABOLIC PNL TOTAL CA: CPT

## 2025-05-06 PROCEDURE — 86376 MICROSOMAL ANTIBODY EACH: CPT

## 2025-05-06 PROCEDURE — 84439 ASSAY OF FREE THYROXINE: CPT

## 2025-05-06 PROCEDURE — 99213 OFFICE O/P EST LOW 20 MIN: CPT | Mod: 25

## 2025-05-06 PROCEDURE — 80061 LIPID PANEL: CPT

## 2025-05-06 PROCEDURE — 90471 IMMUNIZATION ADMIN: CPT

## 2025-05-06 PROCEDURE — 3078F DIAST BP <80 MM HG: CPT

## 2025-05-06 PROCEDURE — 36415 COLL VENOUS BLD VENIPUNCTURE: CPT

## 2025-05-06 SDOH — HEALTH STABILITY: PHYSICAL HEALTH: ON AVERAGE, HOW MANY DAYS PER WEEK DO YOU ENGAGE IN MODERATE TO STRENUOUS EXERCISE (LIKE A BRISK WALK)?: 5 DAYS

## 2025-05-06 ASSESSMENT — SOCIAL DETERMINANTS OF HEALTH (SDOH): HOW OFTEN DO YOU GET TOGETHER WITH FRIENDS OR RELATIVES?: ONCE A WEEK

## 2025-05-06 NOTE — PROGRESS NOTES
Preventive Care Visit  [facility]  Khalida MartinezWAYNE العلي CNP, Family Medicine  May 6, 2025      Assessment & Plan     Annual physical exam  Discussed recommended screenings, patient up-to-date with mammogram, to receive tetanus shot today.  Patient is active and exercises 5 days a week, maintains healthy diet and is at a healthy weight.  No concerns with mood.    Thyroid nodule  Has enlarged thyroid on palpation, this is interfering with swallowing somewhat.  Patient has ENT follow-up and has been following with endocrinology.  Will route recheck her thyroid function today as this has not been done for approximately 6 months.  Last thyroid antibodies available seem to have been done in 2009.  - TSH; Future  - T4, free; Future  - T3, Free; Future  - Thyroid peroxidase antibody; Future  - TSH  - T4, free  - T3, Free  - Thyroid peroxidase antibody    Screening for cardiovascular condition  Lipid screening today, discussed 10-year ASCVD calculator.  Father does have history of coronary artery disease.  - Lipid panel reflex to direct LDL Fasting; Future  - Basic metabolic panel  (Ca, Cl, CO2, Creat, Gluc, K, Na, BUN); Future  - Lipid panel reflex to direct LDL Fasting  - Basic metabolic panel  (Ca, Cl, CO2, Creat, Gluc, K, Na, BUN)    Screen for colon cancer  No history of polyps, no family history of colon cancer.  Patient appropriate for Cologuard and would prefer this method.  Discussed that if positive recommendation would be colonoscopy.  - COLOGUARD([employer]); Future            Counseling  Appropriate preventive services were addressed with this patient via screening, questionnaire, or discussion as appropriate for fall prevention, nutrition, physical activity, Tobacco-use cessation, social engagement, weight loss and cognition.  Checklist reviewing preventive services available has been given to the patient.  Reviewed patient's diet, addressing concerns and/or questions.   She is at  risk for psychosocial distress and has been provided with information to reduce risk.           Sherrill Haile is a 44 year old, presenting for the following:  Physical        5/6/2025     9:06 AM   Additional Questions   Roomed by ASHLEE Varma           Advance Care Planning    Discussed advance care planning with patient; however, patient declined at this time.        5/6/2025   General Health   How would you rate your overall physical health? Good   Feel stress (tense, anxious, or unable to sleep) To some extent   (!) STRESS CONCERN      5/6/2025   Nutrition   Three or more servings of calcium each day? Yes   Diet: Regular (no restrictions)   How many servings of fruit and vegetables per day? (!) 2-3   How many sweetened beverages each day? 0-1         5/6/2025   Exercise   Days per week of moderate/strenous exercise 5 days         5/6/2025   Social Factors   Frequency of gathering with friends or relatives Once a week   Worry food won't last until get money to buy more No   Food not last or not have enough money for food? No   Do you have housing? (Housing is defined as stable permanent housing and does not include staying outside in a car, in a tent, in an abandoned building, in an overnight shelter, or couch-surfing.) Yes   Are you worried about losing your housing? No   Lack of transportation? No   Unable to get utilities (heat,electricity)? No         5/6/2025   Dental   Dentist two times every year? Yes         Today's PHQ-9 Score:       2/25/2025     9:38 AM   PHQ-9 SCORE   PHQ-9 Total Score MyChart 0   PHQ-9 Total Score 0        Patient-reported           5/6/2025   Substance Use   Alcohol more than 3/day or more than 7/wk No   Do you use any other substances recreationally? No     Social History     Tobacco Use    Smoking status: Never     Passive exposure: Never    Smokeless tobacco: Never   Vaping Use    Vaping status: Never Used   Substance Use Topics    Alcohol use: Yes     Comment:  "0-2 a week    Drug use: No          Mammogram Screening - Mammogram every 1-2 years updated in Health Maintenance based on mutual decision making        5/6/2025   STI Screening   New sexual partner(s) since last STI/HIV test? No     History of abnormal Pap smear: Status post hysterectomy. Pap not still indicated.         Latest Ref Rng & Units 8/14/2023     3:51 PM 9/7/2007    12:00 AM 9/15/2006    12:00 AM   PAP / HPV   PAP  Negative for Intraepithelial Lesion or Malignancy (NILM)      PAP (Historical)   NIL  NIL    HPV 16 DNA Negative Negative      HPV 18 DNA Negative Negative      Other HR HPV Negative Negative        ASCVD Risk   The ASCVD Risk score (Kilo LOPEZ, et al., 2019) failed to calculate for the following reasons:    Cannot find a previous HDL lab    Cannot find a previous total cholesterol lab       Reviewed and updated as needed this visit by Provider                             Objective    Exam  /68 (BP Location: Right arm, Patient Position: Sitting, Cuff Size: Adult Regular)   Pulse 73   Temp 97.7  F (36.5  C) (Tympanic)   Resp 16   Ht 1.6 m (5' 3\")   Wt 57.5 kg (126 lb 11.2 oz)   LMP  (LMP Unknown)   SpO2 99%   BMI 22.44 kg/m     Estimated body mass index is 22.44 kg/m  as calculated from the following:    Height as of this encounter: 1.6 m (5' 3\").    Weight as of this encounter: 57.5 kg (126 lb 11.2 oz).    Physical Exam  HENT:      Head: Normocephalic.      Nose: Nose normal.      Mouth/Throat:      Mouth: Mucous membranes are moist.      Pharynx: Oropharynx is clear.   Eyes:      Extraocular Movements: Extraocular movements intact.      Conjunctiva/sclera: Conjunctivae normal.   Neck:      Thyroid: Thyromegaly present.   Cardiovascular:      Rate and Rhythm: Normal rate and regular rhythm.   Pulmonary:      Effort: Pulmonary effort is normal.      Breath sounds: Normal breath sounds.   Abdominal:      General: Bowel sounds are normal.      Palpations: Abdomen is soft.    "   Comments: Mild tenderness post hysterectomy.  No concerns with incision.   Musculoskeletal:      Right lower leg: No edema.      Left lower leg: No edema.   Skin:     General: Skin is warm and dry.      Capillary Refill: Capillary refill takes less than 2 seconds.   Neurological:      Mental Status: She is alert and oriented to person, place, and time.   Psychiatric:         Behavior: Behavior normal.               Signed Electronically by: WAYNE Burleson CNP

## 2025-05-07 ENCOUNTER — RESULTS FOLLOW-UP (OUTPATIENT)
Dept: FAMILY MEDICINE | Facility: CLINIC | Age: 45
End: 2025-05-07

## 2025-05-07 LAB — THYROPEROXIDASE AB SERPL-ACNC: <10 IU/ML

## 2025-05-21 LAB — NONINV COLON CA DNA+OCC BLD SCRN STL QL: NEGATIVE

## 2025-06-26 ENCOUNTER — LAB (OUTPATIENT)
Dept: LAB | Facility: CLINIC | Age: 45
End: 2025-06-26
Payer: COMMERCIAL

## 2025-06-26 DIAGNOSIS — K58.0 IRRITABLE BOWEL SYNDROME WITH DIARRHEA: Primary | ICD-10-CM

## 2025-07-02 ENCOUNTER — RESULTS FOLLOW-UP (OUTPATIENT)
Dept: FAMILY MEDICINE | Facility: CLINIC | Age: 45
End: 2025-07-02

## 2025-07-02 ENCOUNTER — OFFICE VISIT (OUTPATIENT)
Dept: FAMILY MEDICINE | Facility: CLINIC | Age: 45
End: 2025-07-02
Payer: COMMERCIAL

## 2025-07-02 VITALS
TEMPERATURE: 97.8 F | HEART RATE: 82 BPM | RESPIRATION RATE: 16 BRPM | HEIGHT: 63 IN | OXYGEN SATURATION: 99 % | DIASTOLIC BLOOD PRESSURE: 58 MMHG | WEIGHT: 127 LBS | BODY MASS INDEX: 22.5 KG/M2 | SYSTOLIC BLOOD PRESSURE: 92 MMHG

## 2025-07-02 DIAGNOSIS — R20.0 FACIAL NUMBNESS: Primary | ICD-10-CM

## 2025-07-02 DIAGNOSIS — R20.0 NUMBNESS OF FACE: Primary | ICD-10-CM

## 2025-07-02 LAB
ANION GAP SERPL CALCULATED.3IONS-SCNC: 14 MMOL/L (ref 7–15)
BUN SERPL-MCNC: 13.5 MG/DL (ref 6–20)
CALCIUM SERPL-MCNC: 9.2 MG/DL (ref 8.8–10.4)
CHLORIDE SERPL-SCNC: 104 MMOL/L (ref 98–107)
CREAT SERPL-MCNC: 0.79 MG/DL (ref 0.51–0.95)
CRP SERPL-MCNC: <3 MG/L
EGFRCR SERPLBLD CKD-EPI 2021: >90 ML/MIN/1.73M2
ERYTHROCYTE [SEDIMENTATION RATE] IN BLOOD BY WESTERGREN METHOD: 6 MM/HR (ref 0–20)
FOLATE SERPL-MCNC: 18.8 NG/ML (ref 4.6–34.8)
GLUCOSE SERPL-MCNC: 90 MG/DL (ref 70–99)
HCO3 SERPL-SCNC: 22 MMOL/L (ref 22–29)
MAGNESIUM SERPL-MCNC: 2.3 MG/DL (ref 1.7–2.3)
POTASSIUM SERPL-SCNC: 3.7 MMOL/L (ref 3.4–5.3)
SODIUM SERPL-SCNC: 140 MMOL/L (ref 135–145)
VALPROATE SERPL-MCNC: <2.8 UG/ML (ref 50–100)
VIT B12 SERPL-MCNC: 873 PG/ML (ref 232–1245)
VIT D+METAB SERPL-MCNC: 33 NG/ML (ref 20–50)

## 2025-07-02 PROCEDURE — 3078F DIAST BP <80 MM HG: CPT

## 2025-07-02 PROCEDURE — 80164 ASSAY DIPROPYLACETIC ACD TOT: CPT

## 2025-07-02 PROCEDURE — 86140 C-REACTIVE PROTEIN: CPT

## 2025-07-02 PROCEDURE — 80048 BASIC METABOLIC PNL TOTAL CA: CPT

## 2025-07-02 PROCEDURE — 99213 OFFICE O/P EST LOW 20 MIN: CPT

## 2025-07-02 PROCEDURE — 85652 RBC SED RATE AUTOMATED: CPT

## 2025-07-02 PROCEDURE — 82607 VITAMIN B-12: CPT

## 2025-07-02 PROCEDURE — 82306 VITAMIN D 25 HYDROXY: CPT

## 2025-07-02 PROCEDURE — 82746 ASSAY OF FOLIC ACID SERUM: CPT

## 2025-07-02 PROCEDURE — 83735 ASSAY OF MAGNESIUM: CPT

## 2025-07-02 PROCEDURE — 3074F SYST BP LT 130 MM HG: CPT

## 2025-07-02 PROCEDURE — 36415 COLL VENOUS BLD VENIPUNCTURE: CPT

## 2025-07-02 RX ORDER — PREDNISONE 20 MG/1
TABLET ORAL
Qty: 20 TABLET | Refills: 0 | Status: SHIPPED | OUTPATIENT
Start: 2025-07-02

## 2025-07-02 RX ORDER — COLESTIPOL HYDROCHLORIDE 1 G/1
1 TABLET ORAL DAILY
COMMUNITY
Start: 2025-05-28

## 2025-07-02 RX ORDER — PANTOPRAZOLE SODIUM 20 MG/1
1 TABLET, DELAYED RELEASE ORAL DAILY
COMMUNITY
Start: 2025-05-28

## 2025-07-02 RX ORDER — TIZANIDINE 2 MG/1
TABLET ORAL
COMMUNITY
Start: 2025-06-06

## 2025-07-02 ASSESSMENT — ENCOUNTER SYMPTOMS: NUMBNESS: 1

## 2025-07-02 NOTE — PROGRESS NOTES
Assessment & Plan     Numbness of face  Bilateral along jawline on left left upper lip and on giant line on right which started 2 days ago.  Also having some numbness to thumbs bilaterally and large toes bilaterally.  Cranial nerves II through XII intact, no unilateral weakness on exam, areas are sensitive to painful stimuli.  Discussed vitamin deficiency versus medication change versus nerve inflammation due to infectious cause versus autoimmune cause.  Will check vitamin levels, ESR, CRP.  Patient DC'd valproic acid approximately 1 month ago, was recently on colestipol and pantoprazole but stopped taking those.  Also having some hair loss.  - Vitamin B12; Future  - CRP, inflammation; Future  - Magnesium; Future  - ESR: Erythrocyte sedimentation rate; Future  - Basic metabolic panel  (Ca, Cl, CO2, Creat, Gluc, K, Na, BUN); Future  - Valproic acid; Future  - Vitamin D Deficiency; Future  - predniSONE (DELTASONE) 20 MG tablet; Take 3 tabs by mouth daily x 3 days, then 2 tabs daily x 3 days, then 1 tab daily x 3 days, then 1/2 tab daily x 3 days.  - Folate; Future  - Vitamin B12  - CRP, inflammation  - Magnesium  - ESR: Erythrocyte sedimentation rate  - Basic metabolic panel  (Ca, Cl, CO2, Creat, Gluc, K, Na, BUN)  - Valproic acid  - Vitamin D Deficiency  - Folate          Subjective   Lazara is a 44 year old, presenting for the following health issues:  Numbness (In the great toes, thumbs and the face x2 days. Losing hair)      7/2/2025     7:56 AM   Additional Questions   Roomed by brandon blum   Accompanied by spouse     Numbness to face around chin, big toes, and thumbs. Foot symptoms for 1 month, thumbs and face in past 2 days.   Brian has been falling out in clumps    Early June stopped depakote, last week stopped new GI meds. Had been on longer term.     Saw endo 5/19/2025 and all normal.     Numbness  This is a new problem. The current episode started 1 to 4 weeks ago. The problem occurs constantly. The problem has  "been gradually worsening. Associated symptoms include numbness. Nothing aggravates the symptoms. She has tried nothing for the symptoms.   History of Present Illness       Reason for visit:  Numbness toes/fingers/face  Symptom onset:  More than a month  Symptom intensity:  Moderate  Symptom progression:  Staying the same  Had these symptoms before:  Yes  Has tried/received treatment for these symptoms:  No   She is taking medications regularly.                      Objective    BP 92/58 (BP Location: Right arm, Patient Position: Sitting)   Pulse 82   Temp 97.8  F (36.6  C) (Tympanic)   Resp 16   Ht 1.6 m (5' 3\")   Wt 57.6 kg (127 lb)   LMP  (LMP Unknown)   SpO2 99%   BMI 22.50 kg/m    Body mass index is 22.5 kg/m .  Physical Exam  HENT:      Head: Normocephalic.      Right Ear: Tympanic membrane is bulging.      Left Ear: Tympanic membrane is bulging.      Mouth/Throat:      Mouth: Mucous membranes are moist.   Eyes:      Extraocular Movements: Extraocular movements intact.      Conjunctiva/sclera: Conjunctivae normal.   Cardiovascular:      Rate and Rhythm: Normal rate.   Pulmonary:      Effort: Pulmonary effort is normal.   Abdominal:      General: Bowel sounds are normal.      Palpations: Abdomen is soft.   Musculoskeletal:      Cervical back: Neck supple. No tenderness.      Right lower leg: No edema.      Left lower leg: No edema.   Lymphadenopathy:      Cervical: No cervical adenopathy.   Skin:     General: Skin is warm and dry.      Capillary Refill: Capillary refill takes less than 2 seconds.   Neurological:      General: No focal deficit present.      Mental Status: She is alert and oriented to person, place, and time.      Cranial Nerves: Cranial nerves 2-12 are intact. No cranial nerve deficit or facial asymmetry.      Sensory: Sensory deficit present.      Motor: Motor function is intact.      Gait: Gait is intact.   Psychiatric:         Behavior: Behavior normal.         Thought Content: Thought " content normal.                    Signed Electronically by: WAYNE Burleson CNP

## 2025-07-07 ENCOUNTER — APPOINTMENT (OUTPATIENT)
Dept: LAB | Facility: CLINIC | Age: 45
End: 2025-07-07
Payer: COMMERCIAL

## 2025-07-07 LAB
C CAYETANENSIS DNA STL QL NAA+NON-PROBE: NEGATIVE
CAMPYLOBACTER DNA SPEC NAA+PROBE: NEGATIVE
CRYPTOSP DNA STL QL NAA+NON-PROBE: NEGATIVE
EC STX1+STX2 GENES STL QL NAA+NON-PROBE: NEGATIVE
G LAMBLIA DNA STL QL NAA+NON-PROBE: NEGATIVE
NOROVIRUS GI+II RNA STL QL NAA+NON-PROBE: NEGATIVE
SALMONELLA SP RPOD STL QL NAA+PROBE: NEGATIVE
SHIGELLA SP+EIEC IPAH ST NAA+NON-PROBE: NEGATIVE
VIBRIO DNA SPEC NAA+PROBE: POSITIVE
Y ENTEROCOL DNA STL QL NAA+PROBE: NEGATIVE

## 2025-07-08 ENCOUNTER — TRANSFERRED RECORDS (OUTPATIENT)
Dept: ADMINISTRATIVE | Facility: CLINIC | Age: 45
End: 2025-07-08
Payer: COMMERCIAL

## 2025-07-08 LAB
FAT STL QL: NORMAL
NEUTRAL FAT STL QL: NORMAL
O+P STL MICRO: NEGATIVE
SPECIMEN TYPE: NORMAL

## 2025-07-09 LAB — ELASTASE PANC STL-MCNT: >800 UG/G

## 2025-07-09 NOTE — PROGRESS NOTES
2025        Lazara Bahena   [address] [place] [address]-      Lazara Bahena,  :  1980    I'm writing to let you know about the tests that were taken recently.   Thank you for allowing [facility] the opportunity to take part in your healthcare.  At [facility] we strive to provide each patient with the finest gastroenterology care available.  We hope your experience was pleasant and informative.    As a follow up to my voicemail, your recent stool test came back positive for the vibrio species which falls in the same class as cholera. Very important you keep up with hydration and practice proper hand hygiene as this is transmitted through fecal oral route with contaminated water or food . Also we will treat this with an antibiotic called doxycycline which you will take 300 mg once.      Thank you.    Electronically signed by:  Gloria Aguayo NP 2025 03:59 PM  Document generated by:  Gloria Aguayo NP  2025  If your provider ordered multiple tests; the results may not become available at the same time.  If multiple test results are received within 14 days of one another, you may receive a duplicate.  cc:  Artemio Dawn MD

## 2025-07-11 NOTE — PROGRESS NOTES
07/10/2025        Lazara Bahena   [address] [place] [address]-      Lazara Josef,  :  1980    I'm writing to let you know about the tests that were taken recently.   Thank you for allowing [facility] the opportunity to take part in your healthcare.  At [facility] we strive to provide each patient with the finest gastroenterology care available.  We hope your experience was pleasant and informative.    Your stool test is negative for ova and parasites.      Thank you.    Electronically signed by:  Gloria Aguayo NP 07/10/2025 02:42 PM  Document generated by:  Gloria Aguayo NP  07/10/2025  If your provider ordered multiple tests; the results may not become available at the same time.  If multiple test results are received within 14 days of one another, you may receive a duplicate.  cc:  Artemio Dawn MD

## 2025-07-22 ENCOUNTER — TELEPHONE (OUTPATIENT)
Dept: FAMILY MEDICINE | Facility: CLINIC | Age: 45
End: 2025-07-22
Payer: COMMERCIAL

## 2025-07-23 ENCOUNTER — TRANSFERRED RECORDS (OUTPATIENT)
Dept: HEALTH INFORMATION MANAGEMENT | Facility: CLINIC | Age: 45
End: 2025-07-23
Payer: COMMERCIAL

## 2025-07-29 ENCOUNTER — TELEPHONE (OUTPATIENT)
Dept: FAMILY MEDICINE | Facility: CLINIC | Age: 45
End: 2025-07-29
Payer: COMMERCIAL

## 2025-07-29 NOTE — TELEPHONE ENCOUNTER
General Call    Contacts       Contact Date/Time Type Contact Phone/Fax    07/29/2025 08:35 AM CDT Phone (Incoming) Lazara Bahena (Self) [phone] (M)        Reason for Call:  MRI ordered 7/9/25 by Khalida Worrell needs to be cancelled/withdrawn according to Pt.     What are your questions or concerns:  Has appt this Friday with a Neurologist. According to this neurologist, Pt needs a different MRI view than what was ordered. Her insurance will not pay for this suggested MRI until this one is cancelled. Pt was told this can take 72 hours to reprocess.     Date of last appointment with provider: 7/2/25    Could we send this information to you in [employer] or would you prefer to receive a phone call?:   Patient would like to be contacted via [employer]

## 2025-08-09 ENCOUNTER — OFFICE VISIT (OUTPATIENT)
Dept: URGENT CARE | Facility: URGENT CARE | Age: 45
End: 2025-08-09
Payer: COMMERCIAL

## 2025-08-09 VITALS
OXYGEN SATURATION: 100 % | TEMPERATURE: 98.2 F | SYSTOLIC BLOOD PRESSURE: 103 MMHG | WEIGHT: 126 LBS | BODY MASS INDEX: 22.32 KG/M2 | HEART RATE: 84 BPM | DIASTOLIC BLOOD PRESSURE: 71 MMHG | RESPIRATION RATE: 16 BRPM

## 2025-08-09 DIAGNOSIS — G43.809 OTHER MIGRAINE WITHOUT STATUS MIGRAINOSUS, NOT INTRACTABLE: Primary | ICD-10-CM

## 2025-08-09 PROCEDURE — 3078F DIAST BP <80 MM HG: CPT

## 2025-08-09 PROCEDURE — 99213 OFFICE O/P EST LOW 20 MIN: CPT | Mod: 25

## 2025-08-09 PROCEDURE — 3074F SYST BP LT 130 MM HG: CPT

## 2025-08-09 PROCEDURE — 96372 THER/PROPH/DIAG INJ SC/IM: CPT | Performed by: FAMILY MEDICINE

## 2025-08-09 RX ORDER — KETOROLAC TROMETHAMINE 30 MG/ML
30 INJECTION, SOLUTION INTRAMUSCULAR; INTRAVENOUS ONCE
Status: COMPLETED | OUTPATIENT
Start: 2025-08-09 | End: 2025-08-09

## 2025-08-09 RX ADMIN — KETOROLAC TROMETHAMINE 60 MG: 30 INJECTION, SOLUTION INTRAMUSCULAR; INTRAVENOUS at 09:55

## 2025-08-12 ENCOUNTER — OFFICE VISIT (OUTPATIENT)
Dept: FAMILY MEDICINE | Facility: CLINIC | Age: 45
End: 2025-08-12
Payer: COMMERCIAL

## 2025-08-12 VITALS
SYSTOLIC BLOOD PRESSURE: 113 MMHG | TEMPERATURE: 98.1 F | DIASTOLIC BLOOD PRESSURE: 71 MMHG | BODY MASS INDEX: 22.5 KG/M2 | HEIGHT: 63 IN | WEIGHT: 127 LBS | HEART RATE: 81 BPM | OXYGEN SATURATION: 100 %

## 2025-08-12 DIAGNOSIS — F41.1 GENERALIZED ANXIETY DISORDER: Primary | ICD-10-CM

## 2025-08-12 PROCEDURE — 3078F DIAST BP <80 MM HG: CPT | Performed by: INTERNAL MEDICINE

## 2025-08-12 PROCEDURE — 3074F SYST BP LT 130 MM HG: CPT | Performed by: INTERNAL MEDICINE

## 2025-08-12 PROCEDURE — 99213 OFFICE O/P EST LOW 20 MIN: CPT | Performed by: INTERNAL MEDICINE

## 2025-08-12 RX ORDER — VENLAFAXINE HYDROCHLORIDE 37.5 MG/1
CAPSULE, EXTENDED RELEASE ORAL
Qty: 60 CAPSULE | Refills: 3 | Status: SHIPPED | OUTPATIENT
Start: 2025-08-12

## 2025-08-12 ASSESSMENT — ANXIETY QUESTIONNAIRES
5. BEING SO RESTLESS THAT IT IS HARD TO SIT STILL: SEVERAL DAYS
7. FEELING AFRAID AS IF SOMETHING AWFUL MIGHT HAPPEN: MORE THAN HALF THE DAYS
IF YOU CHECKED OFF ANY PROBLEMS ON THIS QUESTIONNAIRE, HOW DIFFICULT HAVE THESE PROBLEMS MADE IT FOR YOU TO DO YOUR WORK, TAKE CARE OF THINGS AT HOME, OR GET ALONG WITH OTHER PEOPLE: VERY DIFFICULT
1. FEELING NERVOUS, ANXIOUS, OR ON EDGE: MORE THAN HALF THE DAYS
GAD7 TOTAL SCORE: 12
6. BECOMING EASILY ANNOYED OR IRRITABLE: SEVERAL DAYS
8. IF YOU CHECKED OFF ANY PROBLEMS, HOW DIFFICULT HAVE THESE MADE IT FOR YOU TO DO YOUR WORK, TAKE CARE OF THINGS AT HOME, OR GET ALONG WITH OTHER PEOPLE?: VERY DIFFICULT
GAD7 TOTAL SCORE: 12
7. FEELING AFRAID AS IF SOMETHING AWFUL MIGHT HAPPEN: MORE THAN HALF THE DAYS
4. TROUBLE RELAXING: MORE THAN HALF THE DAYS
2. NOT BEING ABLE TO STOP OR CONTROL WORRYING: MORE THAN HALF THE DAYS
GAD7 TOTAL SCORE: 12
3. WORRYING TOO MUCH ABOUT DIFFERENT THINGS: MORE THAN HALF THE DAYS

## 2025-08-12 ASSESSMENT — ENCOUNTER SYMPTOMS: NERVOUS/ANXIOUS: 1

## (undated) DEVICE — DAVINCI XI DRAPE COLUMN 470341

## (undated) DEVICE — BLADE KNIFE SURG 11 371111

## (undated) DEVICE — SOL WATER IRRIG 1000ML BOTTLE 2F7114

## (undated) DEVICE — PROTECTOR ARM STANDARD ONE STEP 40433 (COI)

## (undated) DEVICE — DAVINCI HOT SHEARS TIP COVER  400180

## (undated) DEVICE — MAT FLOOR WATERPROOF BACKSHEET FMBP30

## (undated) DEVICE — DEVICE RUMI II KOH-EFFICIENT 3.5CM KC-RUMI-35

## (undated) DEVICE — DAVINCI XI HANDPIECE ESU VESSEL SEALER 8MM EXT 480422

## (undated) DEVICE — Device

## (undated) DEVICE — BRIEF STRETCH XL MPS40

## (undated) DEVICE — SUCTION STRYKERFLOW II 250-070-500

## (undated) DEVICE — LUBRICANT INST ELECTROLUBE EL101

## (undated) DEVICE — DRAPE SHEET TABLE COVER KC 42301*

## (undated) DEVICE — GOWN XXL 9575

## (undated) DEVICE — CATH FOLEY 16FR 5ML LUBRICATH LATEX 0165L16

## (undated) DEVICE — TUBING FILTER TRI-LUMEN AIRSEAL ASC-EVAC1

## (undated) DEVICE — PREP POVIDONE-IODINE 7.5% SCRUB 4OZ BOTTLE MDS093945

## (undated) DEVICE — POSITIONING KIT THE PINK PAD XL 40X20X1IN 40583 (COI)

## (undated) DEVICE — SYR 50ML SLIP TIP W/O NDL 309654

## (undated) DEVICE — GLOVE SURG PI ULTRA TOUCH M SZ 7-1/2 LF

## (undated) DEVICE — SUCTION MANIFOLD NEPTUNE 2 SYS 1 PORT 702-025-000

## (undated) DEVICE — PREP CHLORAPREP 26ML TINTED HI-LITE ORANGE 930815

## (undated) DEVICE — ENDO OBTURATOR ACCESS PORT BLADELESS 8X100MM IAS8-100LP

## (undated) DEVICE — CUSTOM PACK DA VINCI GYN SMA5BDVHEA

## (undated) DEVICE — SU DERMABOND ADVANCED .7ML DNX12

## (undated) DEVICE — NDL INSUFFLATION 13GA 120MM C2201

## (undated) DEVICE — PREP POVIDONE-IODINE 10% SOLUTION 4OZ BOTTLE MDS093944

## (undated) DEVICE — SUTURE MONOCRYL 3-0 18 PS2 UND MCP497G

## (undated) DEVICE — GLOVE SURG PI ULTRA TOUCH M SZ 7 LF 42670

## (undated) DEVICE — DAVINCI XI DRAPE ARM 470015

## (undated) DEVICE — GLOVE PI ULTRATCH M LF SZ 6.5 PF CUFF TEXT STRL LF 42665

## (undated) DEVICE — SU WND CLOSURE VLOC 180 ABS 0 12" GS-21 VLOCL0316

## (undated) DEVICE — DAVINCI XI SEAL UNIVERSAL 5-12MM 470500

## (undated) DEVICE — VIAL DECANTER STERILE WHITE DYNJDEC06

## (undated) DEVICE — DAVINCI XI OBTURATOR BLADELESS 8MM 470359

## (undated) DEVICE — ANTIFOG SOLUTION SEE SHARP 150M TROCAR SWABS 30978 (COI)

## (undated) DEVICE — UTERINE MANIPULATOR RUMI 6.7MMX8CM UMB678

## (undated) RX ORDER — FENTANYL CITRATE 50 UG/ML
INJECTION, SOLUTION INTRAMUSCULAR; INTRAVENOUS
Status: DISPENSED
Start: 2025-03-26

## (undated) RX ORDER — PROPOFOL 10 MG/ML
INJECTION, EMULSION INTRAVENOUS
Status: DISPENSED
Start: 2025-03-26

## (undated) RX ORDER — BUPIVACAINE HYDROCHLORIDE 2.5 MG/ML
INJECTION, SOLUTION INFILTRATION; PERINEURAL
Status: DISPENSED
Start: 2025-03-26

## (undated) RX ORDER — ONDANSETRON 2 MG/ML
INJECTION INTRAMUSCULAR; INTRAVENOUS
Status: DISPENSED
Start: 2025-03-26